# Patient Record
Sex: FEMALE | Race: WHITE | NOT HISPANIC OR LATINO | Employment: OTHER | URBAN - METROPOLITAN AREA
[De-identification: names, ages, dates, MRNs, and addresses within clinical notes are randomized per-mention and may not be internally consistent; named-entity substitution may affect disease eponyms.]

---

## 2017-02-06 DIAGNOSIS — I25.10 CAD (CORONARY ARTERY DISEASE): Primary | ICD-10-CM

## 2017-02-07 RX ORDER — METOPROLOL SUCCINATE 25 MG/1
25 TABLET, EXTENDED RELEASE ORAL DAILY
Qty: 90 TABLET | Refills: 4 | Status: SHIPPED | OUTPATIENT
Start: 2017-02-07 | End: 2018-04-23

## 2017-02-07 RX ORDER — ISOSORBIDE MONONITRATE 60 MG/1
60 TABLET, EXTENDED RELEASE ORAL DAILY
Qty: 91 TABLET | Refills: 3 | Status: SHIPPED | OUTPATIENT
Start: 2017-02-07 | End: 2018-01-24

## 2017-02-15 ENCOUNTER — APPOINTMENT (OUTPATIENT)
Dept: LAB | Facility: CLINIC | Age: 77
End: 2017-02-15
Attending: INTERNAL MEDICINE
Payer: MEDICARE

## 2017-02-15 ENCOUNTER — ONCOLOGY VISIT (OUTPATIENT)
Dept: ONCOLOGY | Facility: CLINIC | Age: 77
End: 2017-02-15
Attending: PHYSICIAN ASSISTANT
Payer: MEDICARE

## 2017-02-15 VITALS
TEMPERATURE: 97.1 F | OXYGEN SATURATION: 98 % | RESPIRATION RATE: 14 BRPM | HEART RATE: 77 BPM | SYSTOLIC BLOOD PRESSURE: 103 MMHG | DIASTOLIC BLOOD PRESSURE: 62 MMHG

## 2017-02-15 DIAGNOSIS — M81.0 OSTEOPOROSIS: ICD-10-CM

## 2017-02-15 DIAGNOSIS — Z92.29 PERSONAL HISTORY OF OTHER DRUG THERAPY: ICD-10-CM

## 2017-02-15 DIAGNOSIS — C50.411 BREAST CANCER OF UPPER-OUTER QUADRANT OF RIGHT FEMALE BREAST (H): Primary | ICD-10-CM

## 2017-02-15 DIAGNOSIS — C50.411 BREAST CANCER OF UPPER-OUTER QUADRANT OF RIGHT FEMALE BREAST (H): ICD-10-CM

## 2017-02-15 LAB
ALBUMIN SERPL-MCNC: 3.3 G/DL (ref 3.4–5)
ALP SERPL-CCNC: 54 U/L (ref 40–150)
ALT SERPL W P-5'-P-CCNC: 23 U/L (ref 0–50)
ANION GAP SERPL CALCULATED.3IONS-SCNC: 9 MMOL/L (ref 3–14)
AST SERPL W P-5'-P-CCNC: 16 U/L (ref 0–45)
BASOPHILS # BLD AUTO: 0.1 10E9/L (ref 0–0.2)
BASOPHILS NFR BLD AUTO: 0.8 %
BILIRUB SERPL-MCNC: 0.3 MG/DL (ref 0.2–1.3)
BUN SERPL-MCNC: 13 MG/DL (ref 7–30)
CALCIUM SERPL-MCNC: 8.4 MG/DL (ref 8.5–10.1)
CHLORIDE SERPL-SCNC: 104 MMOL/L (ref 94–109)
CO2 SERPL-SCNC: 23 MMOL/L (ref 20–32)
CREAT SERPL-MCNC: 0.72 MG/DL (ref 0.52–1.04)
DIFFERENTIAL METHOD BLD: NORMAL
EOSINOPHIL # BLD AUTO: 0.1 10E9/L (ref 0–0.7)
EOSINOPHIL NFR BLD AUTO: 2 %
ERYTHROCYTE [DISTWIDTH] IN BLOOD BY AUTOMATED COUNT: 13.9 % (ref 10–15)
GFR SERPL CREATININE-BSD FRML MDRD: 79 ML/MIN/1.7M2
GLUCOSE SERPL-MCNC: 98 MG/DL (ref 70–99)
HCT VFR BLD AUTO: 37.3 % (ref 35–47)
HGB BLD-MCNC: 12.3 G/DL (ref 11.7–15.7)
IMM GRANULOCYTES # BLD: 0 10E9/L (ref 0–0.4)
IMM GRANULOCYTES NFR BLD: 0.3 %
LYMPHOCYTES # BLD AUTO: 1.6 10E9/L (ref 0.8–5.3)
LYMPHOCYTES NFR BLD AUTO: 26.1 %
MCH RBC QN AUTO: 28.8 PG (ref 26.5–33)
MCHC RBC AUTO-ENTMCNC: 33 G/DL (ref 31.5–36.5)
MCV RBC AUTO: 87 FL (ref 78–100)
MONOCYTES # BLD AUTO: 0.5 10E9/L (ref 0–1.3)
MONOCYTES NFR BLD AUTO: 8.7 %
NEUTROPHILS # BLD AUTO: 3.8 10E9/L (ref 1.6–8.3)
NEUTROPHILS NFR BLD AUTO: 62.1 %
NRBC # BLD AUTO: 0 10*3/UL
NRBC BLD AUTO-RTO: 0 /100
PLATELET # BLD AUTO: 216 10E9/L (ref 150–450)
POTASSIUM SERPL-SCNC: 4.2 MMOL/L (ref 3.4–5.3)
PROT SERPL-MCNC: 6.5 G/DL (ref 6.8–8.8)
RBC # BLD AUTO: 4.27 10E12/L (ref 3.8–5.2)
SODIUM SERPL-SCNC: 136 MMOL/L (ref 133–144)
WBC # BLD AUTO: 6.1 10E9/L (ref 4–11)

## 2017-02-15 PROCEDURE — 96401 CHEMO ANTI-NEOPL SQ/IM: CPT

## 2017-02-15 PROCEDURE — 25000128 H RX IP 250 OP 636: Mod: ZF | Performed by: PHYSICIAN ASSISTANT

## 2017-02-15 PROCEDURE — 85025 COMPLETE CBC W/AUTO DIFF WBC: CPT | Performed by: INTERNAL MEDICINE

## 2017-02-15 PROCEDURE — 99212 OFFICE O/P EST SF 10 MIN: CPT | Mod: ZF

## 2017-02-15 PROCEDURE — 99214 OFFICE O/P EST MOD 30 MIN: CPT | Mod: ZP | Performed by: PHYSICIAN ASSISTANT

## 2017-02-15 PROCEDURE — 96372 THER/PROPH/DIAG INJ SC/IM: CPT

## 2017-02-15 PROCEDURE — 80053 COMPREHEN METABOLIC PANEL: CPT | Performed by: INTERNAL MEDICINE

## 2017-02-15 RX ORDER — METHYLPREDNISOLONE SODIUM SUCCINATE 125 MG/2ML
125 INJECTION, POWDER, LYOPHILIZED, FOR SOLUTION INTRAMUSCULAR; INTRAVENOUS
Status: CANCELLED
Start: 2017-02-15

## 2017-02-15 RX ORDER — SODIUM CHLORIDE 9 MG/ML
1000 INJECTION, SOLUTION INTRAVENOUS CONTINUOUS PRN
Status: CANCELLED
Start: 2017-02-15

## 2017-02-15 RX ORDER — ALBUTEROL SULFATE 90 UG/1
1-2 AEROSOL, METERED RESPIRATORY (INHALATION)
Status: CANCELLED
Start: 2017-02-15

## 2017-02-15 RX ORDER — EPINEPHRINE 0.3 MG/.3ML
0.3 INJECTION SUBCUTANEOUS EVERY 5 MIN PRN
Status: CANCELLED | OUTPATIENT
Start: 2017-02-15

## 2017-02-15 RX ORDER — ALBUTEROL SULFATE 0.83 MG/ML
2.5 SOLUTION RESPIRATORY (INHALATION)
Status: CANCELLED | OUTPATIENT
Start: 2017-02-15

## 2017-02-15 RX ORDER — DIPHENHYDRAMINE HYDROCHLORIDE 50 MG/ML
50 INJECTION INTRAMUSCULAR; INTRAVENOUS
Status: CANCELLED
Start: 2017-02-15

## 2017-02-15 RX ORDER — MEPERIDINE HYDROCHLORIDE 25 MG/ML
25 INJECTION INTRAMUSCULAR; INTRAVENOUS; SUBCUTANEOUS EVERY 30 MIN PRN
Status: CANCELLED | OUTPATIENT
Start: 2017-02-15

## 2017-02-15 RX ADMIN — DENOSUMAB 60 MG: 60 INJECTION SUBCUTANEOUS at 15:00

## 2017-02-15 NOTE — NURSING NOTE
Chief Complaint   Patient presents with     Blood Draw     Labs Drawn      Labs drawn peripherally.     Lauren Schoen, RN

## 2017-02-15 NOTE — MR AVS SNAPSHOT
After Visit Summary   2/15/2017    Shannon Krishna    MRN: [id number]           Patient Information     Date Of Birth          1940        Visit Information        Provider Department      2/15/2017 2:30 PM Iraida Carrillo PA-C [facility]         Follow-ups after your visit        Your next 10 appointments already scheduled     Feb 15, 2017  1:30 PM   [facility] Draw with  [facility] DRAW   [facility] ([facility])    [address] [place] [id number] [phone]            Feb 15, 2017  2:00 PM   (Arrive by 1:45 PM)   MA DIAGNOSTIC BILATERAL W/ VIELKA with [id number]   [facility] ([facility])    [address] [place] [id number] [phone]           Do not use any powder, lotion or deodorant under your arms or on your breast. If you do, we will ask you to remove it before your exam.  Wear comfortable, two-piece clothing.  If you have any allergies, tell your care team.  Bring any previous mammograms from other facilities or have them mailed to the breast center.            Feb 15, 2017  2:30 PM   (Arrive by 2:15 PM)   Return Visit with Iraida Carrillo PA-C   [facility] ([facility])    [address] [place] [id number] [phone]            May 24, 2017 11:30 AM   (Arrive by 11:15 AM)   Return Visit with Davon Lara MD   [facility] Nose [facility] Throat ([facility])    [address] [place] [id number] [phone]              Who to contact     If you have questions or need follow up information about today's clinic visit or your schedule please contact [facility] directly at [phone].  Normal or non-critical lab and imaging results will be communicated to you by MyChart, letter or  phone within 4 business days after the clinic has received the results. If you do not hear from us within 7 days, please contact the clinic through [employer] or phone. If you have a critical or abnormal lab result, we will notify you by phone as soon as possible.  Submit refill requests through [employer] or call your pharmacy and they will forward the refill request to us. Please allow 3 business days for your refill to be completed.          Additional Information About Your Visit        [email or web address] Information     [employer] gives you secure access to your electronic health record. If you see a primary care provider, you can also send messages to your care team and make appointments. If you have questions, please call your primary care clinic.  If you do not have a primary care provider, please call [phone] and they will assist you.         Blood Pressure from Last 3 Encounters:   08/16/16 107/61   05/18/16 94/60   02/11/16 116/69    Weight from Last 3 Encounters:   11/23/16 68.584 kg (151 lb 3.2 oz)   08/16/16 65.908 kg (145 lb 4.8 oz)   08/10/16 64.91 kg (143 lb 1.6 oz)              Today, you had the following     No orders found for display       Primary Care Provider Office Phone # Fax #    Valeria Valdivia [place] [phone]       [address] [address]        Thank you!     Thank you for choosing [facility]  for your care. Our goal is always to provide you with excellent care. Hearing back from our patients is one way we can continue to improve our services. Please take a few minutes to complete the written survey that you may receive in the mail after your visit with us. Thank you!             Your Updated Medication List - Protect others around you: Learn how to safely use, store and throw away your medicines at www.disposemymeds.org.          This list is accurate as of: 12/12/16 11:09 AM.  Always use your most recent med  list.                   Brand Name Dispense Instructions for use    amitriptyline 25 MG tablet    ELAVIL     Take 1-2 tablets by mouth At Bedtime.       anastrozole 1 MG tablet    ARIMIDEX    90 tablet    Take 1 tablet daily       aspirin 81 MG EC tablet      Take 1 tablet by mouth daily.       isosorbide mononitrate 60 MG 24 hr tablet    IMDUR    91 tablet    Take 1 tablet (60 mg) by mouth daily       LIPITOR 40 MG tablet   Generic drug:  atorvastatin      Take 40 mg by mouth daily.       lisinopril 10 MG tablet    PRINIVIL/ZESTRIL     Take 1 tablet by mouth daily 5 mg total       metoprolol 25 MG 24 hr tablet    TOPROL-XL    90 tablet    Take 1 tablet (25 mg) by mouth daily       MULTIVITAMINS PO      Take 1 tablet by mouth daily.       nitroglycerin 0.4 MG sublingual tablet    NITROSTAT    25 tablet    Place 1 tablet (0.4 mg) under the tongue every 5 minutes as needed for chest pain       OMEGA 3 PO      Take  by mouth. 1 every two days       oxazepam 10 MG capsule    SERAX     Take 1 capsule by mouth daily.       ranitidine 150 MG tablet    ZANTAC     Take 1 tablet by mouth 2 times daily. In the morning and in the evening.

## 2017-02-15 NOTE — LETTER
2/15/2017       RE: Shannon Krishna  [address]   [facility] [id number]     Dear Colleague,    Thank you for referring your patient, Shannon Krishna, to the [facility]. Please see a copy of my visit note below.    DIAGNOSIS:  Stage I (T1 N0 M0) infiltrating ductal carcinoma of the right breast. She was originally diagnosed with a right ultrasound-guided biopsy in June of 2008. This revealed an infiltrating ductal carcinoma, [place] grade 2 of 3, ER-positive, [place]-positive, HER2/alfredito 3+. She went on to receive a right lumpectomy with a sentinel lymph node dissection in July of 2008. [place] lymph nodes were negative for malignancy. She began adjuvant TCH (Taxotere, carboplatin, and Herceptin) and received 6 cycles of chemotherapy, completed in December of 2008. She then went on to complete a whole full year or Herceptin. She is status post breast radiation completed in February of 2009. She has been on Arimidex since March of 2009.     INTERVAL HISTORY:  I am seeing Ms. Krishna for followup of her breast carcinoma.  She continues on the Arimidex and is tolerating the medication well.  She does continue to have bilateral knee pain, which is unchanged.  The rest of the review of systems is negative (please see below).     ROS:  Answers for HPI/ROS submitted by the patient on 2/12/2017   General Symptoms: No  Skin Symptoms: No  HENT Symptoms: No  EYE SYMPTOMS: No  HEART SYMPTOMS: No  LUNG SYMPTOMS: No  INTESTINAL SYMPTOMS: No  URINARY SYMPTOMS: No  GYNECOLOGIC SYMPTOMS: No  BREAST SYMPTOMS: No  SKELETAL SYMPTOMS: No  BLOOD SYMPTOMS: No  NERVOUS SYSTEM SYMPTOMS: No  MENTAL HEALTH SYMPTOMS: No    MEDICATIONS:   Current Outpatient Prescriptions   Medication Sig Dispense Refill     metoprolol (TOPROL-XL) 25 MG 24 hr tablet Take 1 tablet (25 mg) by mouth daily 90 tablet 4     isosorbide mononitrate (IMDUR) 60 MG 24 hr tablet Take 1 tablet (60 mg) by mouth daily 91 tablet 3      anastrozole (ARIMIDEX) 1 MG tablet Take 1 tablet (1 mg) by mouth daily 90 tablet 3     nitroglycerin (NITROSTAT) 0.4 MG SL tablet Place 1 tablet (0.4 mg) under the tongue every 5 minutes as needed for chest pain 25 tablet 1     Omega-3 Fatty Acids (OMEGA 3 PO) Take  by mouth. 1 every two days       aspirin EC 81 MG EC tablet Take 1 tablet by mouth daily.       atorvastatin (LIPITOR) 40 MG tablet Take 40 mg by mouth daily.       amitriptyline (ELAVIL) 25 MG tablet Take 1-2 tablets by mouth At Bedtime.       lisinopril (PRINIVIL,ZESTRIL) 10 MG tablet Take 1 tablet by mouth daily 5 mg total       Multiple Vitamin (MULTIVITAMINS PO) Take 1 tablet by mouth daily.       oxazepam (SERAX) 10 MG capsule Take 1 capsule by mouth daily.       ranitidine (ZANTAC) 150 MG tablet Take 1 tablet by mouth 2 times daily. In the morning and in the evening.            ALLERGIES:    Allergies   Allergen Reactions     Nka [No Known Allergies]      Nkda [No Known Drug Allergies]        PHYSICAL EXAMINATION:  Vitals: /62  Pulse 77  Temp 97.1  F (36.2  C) (Oral)  Resp 14  SpO2 98%  GENERAL:  A pleasant person in no acute distress.   HEENT:  Sclerae are nonicteric.    NECK:  Supple.   LYMPH NODES:  No peripheral lymphadenopathy noted in the axillary, supraclavicular, or cervical regions.   LUNGS:  Clear to auscultation bilaterally.   HEART:  Regular rate and rhythm, with systolic murmur appreciated.   ABDOMEN:  Bowel sounds are active.  Soft and nontender.  No hepatosplenomegaly or other masses appreciated.  LOWER EXTREMITIES:  Without pitting edema to the knees bilaterally.   NEUROLOGICAL:  Alert/orientated/able to answer all questions.  CN grossly intact.  BREAST: Right breast normal to inspection.  There continues to be a mild thickening of the right breast without any dominant masses, improving. Left breast is notable for the inverted nipple which is stable. No dominant masses.      LAB:      2/15/2017 12:53   Sodium 136   Potassium  4.2   Chloride 104   Carbon Dioxide 23   Urea Nitrogen 13   Creatinine 0.72   GFR Estimate 79   GFR Estimate If Black >90...   Calcium 8.4 (L)   Anion Gap 9   Albumin 3.3 (L)   Protein Total 6.5 (L)   Bilirubin Total 0.3   Alkaline Phosphatase 54   ALT 23   AST 16   Glucose 98   WBC 6.1   Hemoglobin 12.3   Hematocrit 37.3   Platelet Count 216   RBC Count 4.27   MCV 87   MCH 28.8   MCHC 33.0   RDW 13.9   Diff Method Automated Method   % Neutrophils 62.1   % Lymphocytes 26.1   % Monocytes 8.7   % Eosinophils 2.0   % Basophils 0.8   % Immature Granulocytes 0.3   Nucleated RBCs 0   Absolute Neutrophil 3.8   Absolute Lymphocytes 1.6   Absolute Monocytes 0.5   Absolute Eosinophils 0.1   Absolute Basophils 0.1   Abs Immature Granulocytes 0.0   Absolute Nucleated RBC 0.0       RADIOLOGY:  Right breast digital diagnostic mammogram with computer aided detection and tomosynthesis 02/15/2017      IMPRESSION: BI-RADS CATEGORY: 2 - Benign Finding(s)     RECOMMENDED FOLLOW-UP: Annual Mammography.    IMPRESSION/PLAN:   1.  Stage I (T1 N0 M0) infiltrating ductal carcinoma of the right breast, ER positive, [place] positive, HER-2 positive.  Ms. Krishna continues to do well at this time.  She is not having any signs or symptoms that would suggest recurrence of her breast carcinoma.  Plan to continue the Arimidex for a 10-year course, which will be completed in 03/2019.  Mammogram from today shows no concerns.  I will have her follow up with Dr. Caal in 6 months.   2.  Bone health.  DEXA scan in 08/2016 was consistent with low bone density, with prior reports stating she had osteoporosis.  She was on Zometa, which has recently been changed over to Prolia.  We will plan to continue this on an every 6-month basis.  She is due for the injection today.  Laboratory work is adequate, and she received the injection in clinic.      If there are no interval concerns, the patient will follow up with Dr. Caal in 6 months with Prolia.     Iraida ARCE  Gerardo CARLOS

## 2017-02-15 NOTE — PROGRESS NOTES
DIAGNOSIS:  Stage I (T1 N0 M0) infiltrating ductal carcinoma of the right breast. She was originally diagnosed with a right ultrasound-guided biopsy in June of 2008. This revealed an infiltrating ductal carcinoma, [place] grade 2 of 3, ER-positive, [place]-positive, HER2/alfredito 3+. She went on to receive a right lumpectomy with a sentinel lymph node dissection in July of 2008. [place] lymph nodes were negative for malignancy. She began adjuvant TCH (Taxotere, carboplatin, and Herceptin) and received 6 cycles of chemotherapy, completed in December of 2008. She then went on to complete a whole full year or Herceptin. She is status post breast radiation completed in February of 2009. She has been on Arimidex since March of 2009.     INTERVAL HISTORY:  I am seeing Ms. Krishna for followup of her breast carcinoma.  She continues on the Arimidex and is tolerating the medication well.  She does continue to have bilateral knee pain, which is unchanged.  The rest of the review of systems is negative (please see below).     ROS:  Answers for HPI/ROS submitted by the patient on 2/12/2017   General Symptoms: No  Skin Symptoms: No  HENT Symptoms: No  EYE SYMPTOMS: No  HEART SYMPTOMS: No  LUNG SYMPTOMS: No  INTESTINAL SYMPTOMS: No  URINARY SYMPTOMS: No  GYNECOLOGIC SYMPTOMS: No  BREAST SYMPTOMS: No  SKELETAL SYMPTOMS: No  BLOOD SYMPTOMS: No  NERVOUS SYSTEM SYMPTOMS: No  MENTAL HEALTH SYMPTOMS: No    MEDICATIONS:   Current Outpatient Prescriptions   Medication Sig Dispense Refill     metoprolol (TOPROL-XL) 25 MG 24 hr tablet Take 1 tablet (25 mg) by mouth daily 90 tablet 4     isosorbide mononitrate (IMDUR) 60 MG 24 hr tablet Take 1 tablet (60 mg) by mouth daily 91 tablet 3     anastrozole (ARIMIDEX) 1 MG tablet Take 1 tablet (1 mg) by mouth daily 90 tablet 3     nitroglycerin (NITROSTAT) 0.4 MG SL tablet Place 1 tablet (0.4 mg) under the tongue every 5 minutes as needed for chest pain 25 tablet 1     Omega-3 Fatty Acids (OMEGA 3 PO)  Take  by mouth. 1 every two days       aspirin EC 81 MG EC tablet Take 1 tablet by mouth daily.       atorvastatin (LIPITOR) 40 MG tablet Take 40 mg by mouth daily.       amitriptyline (ELAVIL) 25 MG tablet Take 1-2 tablets by mouth At Bedtime.       lisinopril (PRINIVIL,ZESTRIL) 10 MG tablet Take 1 tablet by mouth daily 5 mg total       Multiple Vitamin (MULTIVITAMINS PO) Take 1 tablet by mouth daily.       oxazepam (SERAX) 10 MG capsule Take 1 capsule by mouth daily.       ranitidine (ZANTAC) 150 MG tablet Take 1 tablet by mouth 2 times daily. In the morning and in the evening.            ALLERGIES:    Allergies   Allergen Reactions     Nka [No Known Allergies]      Nkda [No Known Drug Allergies]        PHYSICAL EXAMINATION:  Vitals: /62  Pulse 77  Temp 97.1  F (36.2  C) (Oral)  Resp 14  SpO2 98%  GENERAL:  A pleasant person in no acute distress.   HEENT:  Sclerae are nonicteric.    NECK:  Supple.   LYMPH NODES:  No peripheral lymphadenopathy noted in the axillary, supraclavicular, or cervical regions.   LUNGS:  Clear to auscultation bilaterally.   HEART:  Regular rate and rhythm, with systolic murmur appreciated.   ABDOMEN:  Bowel sounds are active.  Soft and nontender.  No hepatosplenomegaly or other masses appreciated.  LOWER EXTREMITIES:  Without pitting edema to the knees bilaterally.   NEUROLOGICAL:  Alert/orientated/able to answer all questions.  CN grossly intact.  BREAST: Right breast normal to inspection.  There continues to be a mild thickening of the right breast without any dominant masses, improving. Left breast is notable for the inverted nipple which is stable. No dominant masses.      LAB:      2/15/2017 12:53   Sodium 136   Potassium 4.2   Chloride 104   Carbon Dioxide 23   Urea Nitrogen 13   Creatinine 0.72   GFR Estimate 79   GFR Estimate If Black >90...   Calcium 8.4 (L)   Anion Gap 9   Albumin 3.3 (L)   Protein Total 6.5 (L)   Bilirubin Total 0.3   Alkaline Phosphatase 54   ALT 23   AST  16   Glucose 98   WBC 6.1   Hemoglobin 12.3   Hematocrit 37.3   Platelet Count 216   RBC Count 4.27   MCV 87   MCH 28.8   MCHC 33.0   RDW 13.9   Diff Method Automated Method   % Neutrophils 62.1   % Lymphocytes 26.1   % Monocytes 8.7   % Eosinophils 2.0   % Basophils 0.8   % Immature Granulocytes 0.3   Nucleated RBCs 0   Absolute Neutrophil 3.8   Absolute Lymphocytes 1.6   Absolute Monocytes 0.5   Absolute Eosinophils 0.1   Absolute Basophils 0.1   Abs Immature Granulocytes 0.0   Absolute Nucleated RBC 0.0       RADIOLOGY:  Right breast digital diagnostic mammogram with computer aided detection and tomosynthesis 02/15/2017      IMPRESSION: BI-RADS CATEGORY: 2 - Benign Finding(s)     RECOMMENDED FOLLOW-UP: Annual Mammography.    IMPRESSION/PLAN:   1.  Stage I (T1 N0 M0) infiltrating ductal carcinoma of the right breast, ER positive, [place] positive, HER-2 positive.  Ms. Krishna continues to do well at this time.  She is not having any signs or symptoms that would suggest recurrence of her breast carcinoma.  Plan to continue the Arimidex for a 10-year course, which will be completed in 03/2019.  Mammogram from today shows no concerns.  I will have her follow up with Dr. Caal in 6 months.   2.  Bone health.  DEXA scan in 08/2016 was consistent with low bone density, with prior reports stating she had osteoporosis.  She was on Zometa, which has recently been changed over to Prolia.  We will plan to continue this on an every 6-month basis.  She is due for the injection today.  Laboratory work is adequate, and she received the injection in clinic.      If there are no interval concerns, the patient will follow up with Dr. Caal in 6 months with Prolia.     Iraida Carrillo PA-C

## 2017-02-15 NOTE — NURSING NOTE
"Shannon Krishna is a 76 year old female who presents for:  Chief Complaint   Patient presents with     Blood Draw     Labs Drawn      Oncology Clinic Visit     pt is here for annual FU        Initial Vitals:  /62  Pulse 77  Temp 97.1  F (36.2  C) (Oral)  Resp 14  SpO2 98% Estimated body mass index is 25.19 kg/(m^2) as calculated from the following:    Height as of 11/23/16: 1.65 m (5' 4.96\").    Weight as of 11/23/16: 68.6 kg (151 lb 3.2 oz).. There is no height or weight on file to calculate BSA. BP completed using cuff size: NA (Not Taken)  Data Unavailable No LMP recorded. Patient is postmenopausal. Allergies and medications reviewed.     Medications: Medication refills not needed today.  Pharmacy name entered into [facility]:    [facility] - [place], [place] - [address]  WRITTEN PRESCRIPTION REQUESTED  [facility] - [place], [place] - [phone]    Comments: Vitals taken in lab.    6 minutes for nursing intake (face to face time)   Millie Sanches CMA        "

## 2017-03-15 ENCOUNTER — TELEPHONE (OUTPATIENT)
Dept: ONCOLOGY | Facility: CLINIC | Age: 77
End: 2017-03-15

## 2017-03-15 NOTE — TELEPHONE ENCOUNTER
Shannon called in wanting to speak to Iraidahumberto Carrillo who she last saw in February 2017 for her long term follow up. She has a lump, hard, tender to touch, little red, no warmth to inner aspect of wrist below thumb. She describes it as smaller than a marcello and larger than a pea. The lump is fixed and appeared about 1 week ago. She is concerned it may be a blood clot from getting labs drawn in February. I let her know usually labs are not drawn in that location and it would have occurred immediately following a venipuncture. I advised she contact her PCP and see about getting a PCP set up locally as her current PCP is located in [facility]. She agreed to call PCP and try to see someone locally in [place] so her general health concerns can be more easily addressed.

## 2017-04-15 ASSESSMENT — ENCOUNTER SYMPTOMS
ARTHRALGIAS: 1
MUSCLE WEAKNESS: 0
BACK PAIN: 0
MYALGIAS: 0
JOINT SWELLING: 1
NECK PAIN: 0
STIFFNESS: 0
MUSCLE CRAMPS: 0

## 2017-04-18 ENCOUNTER — OFFICE VISIT (OUTPATIENT)
Dept: ORTHOPEDICS | Facility: CLINIC | Age: 77
End: 2017-04-18

## 2017-04-18 VITALS — SYSTOLIC BLOOD PRESSURE: 127 MMHG | OXYGEN SATURATION: 96 % | HEART RATE: 85 BPM | DIASTOLIC BLOOD PRESSURE: 64 MMHG

## 2017-04-18 DIAGNOSIS — M17.11 PRIMARY OSTEOARTHRITIS OF RIGHT KNEE: ICD-10-CM

## 2017-04-18 DIAGNOSIS — M67.40 GANGLION CYST: ICD-10-CM

## 2017-04-18 DIAGNOSIS — M25.561 CHRONIC PAIN OF RIGHT KNEE: Primary | ICD-10-CM

## 2017-04-18 DIAGNOSIS — G89.29 CHRONIC PAIN OF RIGHT KNEE: Primary | ICD-10-CM

## 2017-04-18 NOTE — MR AVS SNAPSHOT
After Visit Summary   4/18/2017    Shannon Krishna    MRN: [id number]           Patient Information     Date Of Birth          1940        Visit Information        Provider Department      4/18/2017 2:30 PM Elias Baker MD [facility]         Follow-ups after your visit        Your next 10 appointments already scheduled     Apr 28, 2017  3:00 PM CDT   (Arrive by 2:45 PM)   Return Visit with Elias Baker MD   [facility] ([facility])    [address] [place] [id number] [phone]            May 02, 2017 11:00 AM CDT   Ech Complete with [id number]   [facility] ([facility])    [address] [place] [id number] [phone]           1.  Please bring or wear a comfortable two-piece outfit. 2.  You may eat, drink and take your normal medicines. 3.  For any questions that cannot be answered, please contact the ordering physician            May 02, 2017  3:00 PM CDT   (Arrive by 2:45 PM)   Return Visit with NURYS Shin MD   [facility] ([facility])    [address] [place] [id number] [phone]            May 24, 2017 11:30 AM CDT   (Arrive by 11:15 AM)   Return Visit with Davon Lara MD   [facility] ([facility])    [address] [place] [address] [phone]            Aug 29, 2017 12:00 PM CDT   [facility] with  [facility]   [facility] ([facility])    [address] [place] [id number]   [phone]            Aug 29, 2017 12:30 PM CDT   (Arrive by 12:15 PM)   Return Visit with Elias Caal MD   [facility] ([facility])    [address] [place] [address]  [place] [id number] [phone]              Who to contact     Please call your clinic at [phone] to:    Ask questions about your health    Make or cancel appointments    Discuss your medicines    Learn about your test results    Speak to your doctor   If you have compliments or concerns about an experience at your clinic, or if you wish to file a complaint, please contact [facility] Patient Relations at [phone] or email us at [email or web address]         Additional Information About Your Visit        [employer] Information     [employer] gives you secure access to your electronic health record. If you see a primary care provider, you can also send messages to your care team and make appointments. If you have questions, please call your primary care clinic.  If you do not have a primary care provider, please call [phone] and they will assist you.      [employer] is an electronic gateway that provides easy, online access to your medical records. With [employer], you can request a clinic appointment, read your test results, renew a prescription or communicate with your care team.     To access your existing account, please contact your [facility] or call [phone] for assistance.        Care EveryWhere ID     This is your Care EveryWhere ID. This could be used by other organizations to access your [facility] medical records  [id number]        Your Vitals Were     Pulse Pulse Oximetry                85 96%           Blood Pressure from Last 3 Encounters:   04/18/17 127/64   02/15/17 103/62   08/16/16 107/61    Weight from Last 3 Encounters:   11/23/16 68.6 kg (151 lb 3.2 oz)   08/16/16 65.9 kg (145 lb 4.8 oz)   08/10/16 64.9 kg (143 lb 1.6 oz)              Today, you had the following     No orders found for display       Primary Care Provider Office Phone # Fax #    Valeria Reynlods [phone]       [facility] [place]  [place] [address] [facility] [address]        Thank you!     Thank you for choosing [facility]  for your care. Our goal is always to provide you with excellent care. Hearing back from our patients is one way we can continue to improve our services. Please take a few minutes to complete the written survey that you may receive in the mail after your visit with us. Thank you!             Your Updated Medication List - Protect others around you: Learn how to safely use, store and throw away your medicines at www.disposemymeds.org.          This list is accurate as of: 4/18/17  3:02 PM.  Always use your most recent med list.                   Brand Name Dispense Instructions for use    amitriptyline 25 MG tablet    ELAVIL     Take 1-2 tablets by mouth At Bedtime.       anastrozole 1 MG tablet    ARIMIDEX    90 tablet    Take 1 tablet (1 mg) by mouth daily       aspirin 81 MG EC tablet      Take 1 tablet by mouth daily.       isosorbide mononitrate 60 MG 24 hr tablet    IMDUR    91 tablet    Take 1 tablet (60 mg) by mouth daily       LIPITOR 40 MG tablet   Generic drug:  atorvastatin      Take 40 mg by mouth daily.       lisinopril 10 MG tablet    PRINIVIL/ZESTRIL     Take 1 tablet by mouth daily 5 mg total       metoprolol 25 MG 24 hr tablet    TOPROL-XL    90 tablet    Take 1 tablet (25 mg) by mouth daily       MULTIVITAMINS PO      Take 1 tablet by mouth daily.       nitroglycerin 0.4 MG sublingual tablet    NITROSTAT    25 tablet    Place 1 tablet (0.4 mg) under the tongue every 5 minutes as needed for chest pain       OMEGA 3 PO      Take  by mouth. 1 every two days       oxazepam 10 MG capsule    SERAX     Take 1 capsule by mouth daily.       ranitidine 150 MG tablet    ZANTAC     Take 1 tablet by mouth 2 times daily. In the morning and in the evening.

## 2017-04-18 NOTE — NURSING NOTE
[facility]  [address] [place] [id number]  Dept: [phone]  ______________________________________________________________________________    Patient: Shannon Krishna   : 1940   MRN: [id number]   2017    INVASIVE PROCEDURE SAFETY CHECKLIST    Date: 2017   Procedure:Right wrist, right knee cortisone injection  Patient Name: Shannon Krishna  MRN: [id number]  YOB: 1940    Action: Complete sections as appropriate. Any discrepancy results in a HARD COPY until resolved.     PRE PROCEDURE:  Patient ID verified with 2 identifiers (name and  or MRN): Yes  Procedure and site verified with patient/designee (when able): Yes  Accurate consent documentation in medical record: Yes  H&P (or appropriate assessment) documented in medical record: Yes  H&P must be up to 20 days prior to procedure and updates within 24 hours of procedure as applicable: Yes  Relevant diagnostic and radiology test results appropriately labeled and displayed as applicable: Yes  Procedure site(s) marked with provider initials: NA    TIMEOUT:  Time-Out performed immediately prior to starting procedure, including verbal and active participation of all team members addressing the following:Yes  * Correct patient identify  * Confirmed that the correct side and site are marked  * An accurate procedure consent form  * Agreement on the procedure to be done  * Correct patient position  * Relevant images and results are properly labeled and appropriately displayed  * The need to administer antibiotics or fluids for irrigation purposes during the procedure as applicable   * Safety precautions based on patient history or medication use    DURING PROCEDURE: Verification of correct person, site, and procedures any time the responsibility for care of the patient is transferred to another member of the care team.

## 2017-04-18 NOTE — PROGRESS NOTES
HISTORY OF PRESENT ILLNESS    Ms. Krishna returns to discuss her knee pain. Now it is her right knee that bothers her and she would like an injection if possible as previously discussed. She is also interested in PT  She also has c/o right wrist swelling and cyst. She was supposed to see a hand surgeon for possible surgery, but she would like my opinion. It has been present for about a month and is slightly painful  Additional history: as documented  Problem list, Medication list, Allergies, and Medical/Social/Surgical histories reviewed in [facility] and updated as appropriate.     REVIEW OF SYSTEMS 4/18/17  Constitutional: negative for fever, chills, change in weight  Skin: negative for worrisome rashes, moles or lesions  Eyes: negative for vision changes or irritation  Neuro: negative for weakness, dizziness or paresthesias  Resp: negative for significant cough or SOB  GI: negative for nausea, abdominal pain, heartburn, or change in bowel habits   MSK: negative for other joint problems currently, refer to HPI     PHYSICAL EXAM  Vital Signs: /64  Pulse 85  SpO2 96% Patient declined being weighed. There is no height or weight on file to calculate BMI.    General  - normal appearance, in no obvious distress. Overweight  CV  - normal popliteal pulse  Pulm  - normal respiratory pattern, non-labored  Musculoskeletal - right knee  - stance: non antalgic gait, genu varum  - inspection: generalized swelling, trace effusion  - palpation: no medial joint line tenderness, patella and patellar tendon non-tender, normal popliteal pulse  - ROM: 100 degrees flexion, 0 degrees extension, nonpainful active ROM  - strength: 5/5 in flexion, 5/5 in extension  - neuro: no sensory or motor deficit  - special tests:  (-) varus at 0 and 30 degrees flexion  (-) valgus at 0 and 30 degrees flexion  (-)  s compression test  (-) patellar apprehension  Neuro  - no sensory or motor deficit, grossly normal coordination, normal muscle  tone  Skin  - no ecchymosis, erythema, warmth, or induration, no obvious rash  Psych  - interactive, appropriate, normal mood and affect  Right wrist: 1cm large and mobile and slightly tender volar sided ganglion cyst, has full ROM of right wrist         ASSESSMENT    Ms. Krishna is a 75 year old year old female who is in the office today for: right knee pain secondary to osteoarthritis and right wrist ganglion cyst      PLAN    I ordered Shannon the following medication(s) and explained how to take: Tylenol  I gave her a right knee injection today  I also aspirated and injected her right wrist volar ganglion cyst    I independently ordered and reviewed the following imaging studies with Shannon: bilateral knee x-rays showed tri-compartmental arthritis    I recommend ice x 10 min every 2 hours as needed    I gave Shannon a home exercise program     Ordered PT for her knees    Shannon had all of their questions answered and understand to refrain from activities that are not tolerable    I recommend that Shannon return to my office for a re-examination in 4 weeks   She should follow up sooner if the condition worsens or if other problems arise.  It was a pleasure taking care of Shannon.    Dr. Elias Baker    PROCEDURE:         right   Knee joint injection   NDC 6919-4079-91 kenalog     The patient was apprised of the risks and the benefits of the procedure and consented and a written consent was signed.   The patient s  KNEE was sterilely prepped with chloraprep.   40 mg of triamcinolone suspension was drawn up into a 5 mL syringe with 2 mL of 1% lidocaine  The patient was injected with a 1.5-inch 22-gauge needle at the_superiorlateral aspect of their knee joint  There were no complications. The patient tolerated the procedure well. There was minimal bleeding.   The patient was instructed to ice the knee upon leaving clinic and refrain from overuse over the next 3 days.   The patient was instructed to go to the  emergency room with any usual pain, swelling, or redness occurred in the injected area.   The patient was given a followup appointment to evaluate response to the injection to their increased range of motion and reduction of pain.  Follow up PRN      PROCEDURE:       Right wrist ganglion cyst aspiration/ injection   DEPO-MEDROL NDC# 5832-6411-24 WAS INJECTED      The patient was apprised of the risks and the benefits of the procedure and consented and a written consent was signed.   The patient s wrist was sterilely prepped with chloraprep.   40 mg of depo suspension was drawn up into a 3 mL syringe with 1 mL of 1% lidocaine  The patient was aspirated of 1cc of straw colored fluid from ganglion cyst and then injected with a 1.5-inch 18-gauge needle at the right wrist volar ganglion cyst  There were no complications. The patient tolerated the procedure well. There was minimal bleeding.   The patient was instructed to ice the wrist upon leaving clinic and refrain from overuse over the next 3 days.   The patient was instructed to go to the emergency room with any usual pain, swelling, or redness occurred in the injected area.   The patient was given a followup appointment to evaluate response to the injection to their increased range of motion and reduction of pain.  Follow up PRN  Dr Elias Baker

## 2017-04-18 NOTE — LETTER
4/18/2017       RE: Shannon Krishna  [address]   [facility] [address]     Dear Colleague,    Thank you for referring your patient, Shannon Krishna, to the [facility] at [facility]. Please see a copy of my visit note below.    HISTORY OF PRESENT ILLNESS    Ms. Krishna returns to discuss her knee pain. Now it is her right knee that bothers her and she would like an injection if possible as previously discussed. She is also interested in PT  She also has c/o right wrist swelling and cyst. She was supposed to see a hand surgeon for possible surgery, but she would like my opinion. It has been present for about a month and is slightly painful  Additional history: as documented  Problem list, Medication list, Allergies, and Medical/Social/Surgical histories reviewed in [facility] and updated as appropriate.     REVIEW OF SYSTEMS 4/18/17  Constitutional: negative for fever, chills, change in weight  Skin: negative for worrisome rashes, moles or lesions  Eyes: negative for vision changes or irritation  Neuro: negative for weakness, dizziness or paresthesias  Resp: negative for significant cough or SOB  GI: negative for nausea, abdominal pain, heartburn, or change in bowel habits   MSK: negative for other joint problems currently, refer to HPI     PHYSICAL EXAM  Vital Signs: /64  Pulse 85  SpO2 96% Patient declined being weighed. There is no height or weight on file to calculate BMI.    General  - normal appearance, in no obvious distress. Overweight  CV  - normal popliteal pulse  Pulm  - normal respiratory pattern, non-labored  Musculoskeletal - right knee  - stance: non antalgic gait, genu varum  - inspection: generalized swelling, trace effusion  - palpation: no medial joint line tenderness, patella and patellar tendon non-tender, normal popliteal pulse  - ROM: 100 degrees flexion, 0 degrees extension, nonpainful active ROM  - strength: 5/5 in  flexion, 5/5 in extension  - neuro: no sensory or motor deficit  - special tests:  (-) varus at 0 and 30 degrees flexion  (-) valgus at 0 and 30 degrees flexion  (-)  s compression test  (-) patellar apprehension  Neuro  - no sensory or motor deficit, grossly normal coordination, normal muscle tone  Skin  - no ecchymosis, erythema, warmth, or induration, no obvious rash  Psych  - interactive, appropriate, normal mood and affect  Right wrist: 1cm large and mobile and slightly tender volar sided ganglion cyst, has full ROM of right wrist         ASSESSMENT    Ms. Krishna is a 75 year old year old female who is in the office today for: right knee pain secondary to osteoarthritis and right wrist ganglion cyst      PLAN    I ordered Shannon the following medication(s) and explained how to take: Tylenol  I gave her a right knee injection today  I also aspirated and injected her right wrist volar ganglion cyst    I independently ordered and reviewed the following imaging studies with Shannon: bilateral knee x-rays showed tri-compartmental arthritis    I recommend ice x 10 min every 2 hours as needed    I gave Shannon a home exercise program     Ordered PT for her knees    Shannon had all of their questions answered and understand to refrain from activities that are not tolerable    I recommend that Shannon return to my office for a re-examination in 4 weeks   She should follow up sooner if the condition worsens or if other problems arise.  It was a pleasure taking care of Shannon.    Dr. Elias Baker    PROCEDURE:         right   Knee joint injection   NDC 6846-3756-01 kenalog     The patient was apprised of the risks and the benefits of the procedure and consented and a written consent was signed.   The patient s  KNEE was sterilely prepped with chloraprep.   40 mg of triamcinolone suspension was drawn up into a 5 mL syringe with 2 mL of 1% lidocaine  The patient was injected with a 1.5-inch 22-gauge needle at  the_superiorlateral aspect of their knee joint  There were no complications. The patient tolerated the procedure well. There was minimal bleeding.   The patient was instructed to ice the knee upon leaving clinic and refrain from overuse over the next 3 days.   The patient was instructed to go to the emergency room with any usual pain, swelling, or redness occurred in the injected area.   The patient was given a followup appointment to evaluate response to the injection to their increased range of motion and reduction of pain.  Follow up PRN      PROCEDURE:       Right wrist ganglion cyst aspiration/ injection   DEPO-MEDROL NDC# 2737-3132-45 WAS INJECTED      The patient was apprised of the risks and the benefits of the procedure and consented and a written consent was signed.   The patient s wrist was sterilely prepped with chloraprep.   40 mg of depo suspension was drawn up into a 3 mL syringe with 1 mL of 1% lidocaine  The patient was aspirated of 1cc of straw colored fluid from ganglion cyst and then injected with a 1.5-inch 18-gauge needle at the right wrist volar ganglion cyst  There were no complications. The patient tolerated the procedure well. There was minimal bleeding.   The patient was instructed to ice the wrist upon leaving clinic and refrain from overuse over the next 3 days.   The patient was instructed to go to the emergency room with any usual pain, swelling, or redness occurred in the injected area.   The patient was given a followup appointment to evaluate response to the injection to their increased range of motion and reduction of pain.  Follow up PRN  Dr Elias Baker

## 2017-04-19 ASSESSMENT — ENCOUNTER SYMPTOMS
NECK PAIN: 0
ARTHRALGIAS: 1
MUSCLE CRAMPS: 0
JOINT SWELLING: 0
STIFFNESS: 0
JOINT SWELLING: 0
MUSCLE CRAMPS: 0
BACK PAIN: 0
ARTHRALGIAS: 1
BACK PAIN: 0
MUSCLE WEAKNESS: 0
STIFFNESS: 0
NECK PAIN: 0
MYALGIAS: 0
MYALGIAS: 0
MUSCLE WEAKNESS: 0

## 2017-04-28 ENCOUNTER — OFFICE VISIT (OUTPATIENT)
Dept: ORTHOPEDICS | Facility: CLINIC | Age: 77
End: 2017-04-28

## 2017-04-28 DIAGNOSIS — M17.11 PRIMARY OSTEOARTHRITIS OF RIGHT KNEE: Primary | ICD-10-CM

## 2017-04-28 DIAGNOSIS — M67.40 GANGLION CYST: ICD-10-CM

## 2017-04-28 NOTE — LETTER
4/28/2017       RE: Shannon Krishna  [address]   [facility] [address]     Dear Colleague,    Thank you for referring your patient, Shannon Krishna, to the [facility] at [facility]. Please see a copy of my visit note below.    HISTORY OF PRESENT ILLNESS    Ms. Krishna returns for followup, no pain and no cyst in her wrist today. Had injection last visit  Knee feels much better, still small amount of pain, will start PT next week    Additional history: as documented  Problem list, Medication list, Allergies, and Medical/Social/Surgical histories reviewed in [facility] and updated as appropriate.     REVIEW OF SYSTEMS 4/28  Constitutional: negative for fever, chills, change in weight  Skin: negative for worrisome rashes, moles or lesions  Eyes: negative for vision changes or irritation  Neuro: negative for weakness, dizziness or paresthesias  Resp: negative for significant cough or SOB  GI: negative for nausea, abdominal pain, heartburn, or change in bowel habits   MSK: negative for other joint problems currently, refer to HPI     PHYSICAL EXAM  Vital Signs: VSS , reviewed  General  - normal appearance, in no obvious distress. Overweight  CV  - normal popliteal pulse  Pulm  - normal respiratory pattern, non-labored  Musculoskeletal - right knee  - stance: non antalgic gait, genu varum  - inspection: no generalized swelling, trace effusion  - palpation: no medial joint line tenderness, patella and patellar tendon non-tender, normal popliteal pulse  - ROM: 100 degrees flexion, 0 degrees extension, non painful active ROM  - strength: 5/5 in flexion, 5/5 in extension  - neuro: no sensory or motor deficit  - special tests:  (-) varus at 0 and 30 degrees flexion  (-) valgus at 0 and 30 degrees flexion  (-)  s compression test  (-) patellar apprehension  Neuro  - no sensory or motor deficit, grossly normal coordination, normal muscle tone  Skin  - no  ecchymosis, erythema, warmth, or induration, no obvious rash  Psych  - interactive, appropriate, normal mood and affect  Right wrist: non tender and reduced to minimum volar sided ganglion cyst, has full ROM of right wrist  Improved overall       ASSESSMENT    Ms. Krishna is a 75 year old year old female who is in the office today for: right knee pain secondary to osteoarthritis- improved and right wrist ganglion cyst- improved      PLAN    I ordered Shannon the following medication(s) and explained how to take: Tylenol and voltaren gel for knee  Consider repeat injections for either wrist or knee in the future if not improving    Shannon had all of their questions answered and understand to refrain from activities that are not tolerable    I recommend that Shannon return to my office for a re-examination in 4 weeks as needed after PT   She should follow up sooner if the condition worsens or if other problems arise.  It was a pleasure taking care of Shannon.    Sincerely,    Elias Baker MD

## 2017-04-28 NOTE — PROGRESS NOTES
HISTORY OF PRESENT ILLNESS    Ms. Krishna returns for followup, no pain and no cyst in her wrist today. Had injection last visit  Knee feels much better, still small amount of pain, will start PT next week    Additional history: as documented  Problem list, Medication list, Allergies, and Medical/Social/Surgical histories reviewed in [facility] and updated as appropriate.     REVIEW OF SYSTEMS 4/28  Constitutional: negative for fever, chills, change in weight  Skin: negative for worrisome rashes, moles or lesions  Eyes: negative for vision changes or irritation  Neuro: negative for weakness, dizziness or paresthesias  Resp: negative for significant cough or SOB  GI: negative for nausea, abdominal pain, heartburn, or change in bowel habits   MSK: negative for other joint problems currently, refer to [facility]     PHYSICAL EXAM  Vital Signs: VSS , reviewed  General  - normal appearance, in no obvious distress. Overweight  CV  - normal popliteal pulse  Pulm  - normal respiratory pattern, non-labored  Musculoskeletal - right knee  - stance: non antalgic gait, genu varum  - inspection: no generalized swelling, trace effusion  - palpation: no medial joint line tenderness, patella and patellar tendon non-tender, normal popliteal pulse  - ROM: 100 degrees flexion, 0 degrees extension, non painful active ROM  - strength: 5/5 in flexion, 5/5 in extension  - neuro: no sensory or motor deficit  - special tests:  (-) varus at 0 and 30 degrees flexion  (-) valgus at 0 and 30 degrees flexion  (-)  s compression test  (-) patellar apprehension  Neuro  - no sensory or motor deficit, grossly normal coordination, normal muscle tone  Skin  - no ecchymosis, erythema, warmth, or induration, no obvious rash  Psych  - interactive, appropriate, normal mood and affect  Right wrist: non tender and reduced to minimum volar sided ganglion cyst, has full ROM of right wrist  Improved overall       ASSESSMENT    Ms. Krishna is a 75 year old year old female who  is in the office today for: right knee pain secondary to osteoarthritis- improved and right wrist ganglion cyst- improved      PLAN    I ordered Shannon the following medication(s) and explained how to take: Tylenol and voltaren gel for knee  Consider repeat injections for either wrist or knee in the future if not improving    Shannon had all of their questions answered and understand to refrain from activities that are not tolerable    I recommend that Shannon return to my office for a re-examination in 4 weeks as needed after PT   She should follow up sooner if the condition worsens or if other problems arise.  It was a pleasure taking care of Shannon.    Dr. Elias Baker

## 2017-04-28 NOTE — NURSING NOTE
Reason For Visit:   Chief Complaint   Patient presents with     RECHECK     Follow up. Right wrist and right knee. Pt stated that they are better.        Pain Assessment  Patient Currently in Pain: Yes  0-10 Pain Scale: 5  Primary Pain Location: Knee  Pain Orientation: Right  Pain Descriptors:  (intermittent)  Alleviating Factors:  (ace bandage)  Aggravating Factors:  (Going down stairs. )         Current Outpatient Prescriptions   Medication Sig Dispense Refill     metoprolol (TOPROL-XL) 25 MG 24 hr tablet Take 1 tablet (25 mg) by mouth daily 90 tablet 4     isosorbide mononitrate (IMDUR) 60 MG 24 hr tablet Take 1 tablet (60 mg) by mouth daily 91 tablet 3     anastrozole (ARIMIDEX) 1 MG tablet Take 1 tablet (1 mg) by mouth daily 90 tablet 3     nitroglycerin (NITROSTAT) 0.4 MG SL tablet Place 1 tablet (0.4 mg) under the tongue every 5 minutes as needed for chest pain 25 tablet 1     Omega-3 Fatty Acids (OMEGA 3 PO) Take  by mouth. 1 every two days       aspirin EC 81 MG EC tablet Take 1 tablet by mouth daily.       atorvastatin (LIPITOR) 40 MG tablet Take 40 mg by mouth daily.       amitriptyline (ELAVIL) 25 MG tablet Take 1-2 tablets by mouth At Bedtime.       lisinopril (PRINIVIL,ZESTRIL) 10 MG tablet Take 1 tablet by mouth daily 5 mg total       Multiple Vitamin (MULTIVITAMINS PO) Take 1 tablet by mouth daily.       oxazepam (SERAX) 10 MG capsule Take 1 capsule by mouth daily.       ranitidine (ZANTAC) 150 MG tablet Take 1 tablet by mouth 2 times daily. In the morning and in the evening.             Allergies   Allergen Reactions     Nka [No Known Allergies]      Nkda [No Known Drug Allergies]

## 2017-04-28 NOTE — MR AVS SNAPSHOT
After Visit Summary   4/28/2017    Shannon Krishna    MRN: [id number]           Patient Information     Date Of Birth          1940        Visit Information        Provider Department      4/28/2017 3:00 PM Elias Baker MD [facility]        Today's Diagnoses     Primary osteoarthritis of right knee    -  1    Ganglion cyst           Follow-ups after your visit        Your next 10 appointments already scheduled     May 01, 2017 12:50 PM CDT   (Arrive by 12:35 PM)   KATHARINE Extremity with Elizabeth Aleman PT   [facility] KATHARINE ([facility])    [address] [place] [id number] [phone]            May 02, 2017 11:00 AM CDT   Ech Complete with UCECHCR2   [facility] ([facility])    [address] [place] [address] [id number] [phone]           1.  Please bring or wear a comfortable two-piece outfit. 2.  You may eat, drink and take your normal medicines. 3.  For any questions that cannot be answered, please contact the ordering physician            May 02, 2017  3:00 PM CDT   (Arrive by 2:45 PM)   Return Visit with NURYS Shin MD   [facility] ([facility])    [address] [place] [phone]            May 08, 2017 12:00 PM CDT   KTAHARINE Extremity with Diana Peralta PT   [facility] KATHARINE ([facility])    [address] [place] [address] [id number] [phone]            May 24, 2017 11:30 AM CDT   (Arrive by 11:15 AM)   Return Visit with Davon Lara MD   [facility] ([facility])    [address] [place] [phone]            Aug 29, 2017 12:00 PM CDT   [facility] Draw with  [facility] DRAW   [facility] ([facility])    [address] [phone]            Aug 29, 2017 12:30 PM CDT   (Arrive by 12:15 PM)   Return Visit with Elias Caal MD   [facility] ([facility])    [address] [phone]              Who to contact     Please call your clinic at [phone] to:    Ask questions about your health    Make or cancel appointments    Discuss your medicines    Learn about your test results    Speak to your doctor   If you have compliments or concerns about an experience at your clinic, or if you wish to file a complaint, please contact [facility] Patient Relations at [phone] or email us at [email or web address]         Additional Information About Your Visit        [employer] eShoe Information     [employer] gives you secure access to your electronic health record. If you see a primary care provider, you can also send messages to your care team and make appointments. If you have questions, please call your primary care clinic.  If you do not have a primary care provider, please call [phone] and they will assist you.      [employer] is an electronic gateway that provides easy, online access to your medical records. With [employer], you can request a clinic appointment, read your test results, renew a prescription or communicate with your care team.     To access your existing account, please contact your [facility] or call [phone] for assistance.        Care EveryWhere ID     This is your Care EveryWhere ID. This could be used by other organizations to access your [facility] medical records  [id number]         Blood Pressure from Last 3 Encounters:   04/18/17 127/64   02/15/17 103/62   08/16/16 107/61    Weight from Last 3 Encounters:   11/23/16 68.6 kg (151 lb 3.2 oz)   08/16/16 65.9 kg  (145 lb 4.8 oz)   08/10/16 64.9 kg (143 lb 1.6 oz)              Today, you had the following     No orders found for display         Today's Medication Changes          These changes are accurate as of: 4/28/17  4:01 PM.  If you have any questions, ask your nurse or doctor.               Start taking these medicines.        Dose/Directions    diclofenac 1 % Gel topical gel   Commonly known as:  VOLTAREN   Used for:  Primary osteoarthritis of right knee   Started by:  Elias Baker MD        Apply 4 grams to knees or 2 grams to hands four times daily using enclosed dosing card.   Quantity:  100 g   Refills:  1            Where to get your medicines      Call your pharmacy to confirm that your medication is ready for pickup. It may take up to 24 hours for them to receive the prescription. If the prescription is not ready within 3 business days, please contact your clinic or your provider.     We will let you know when these medications are ready. If you don't hear back within 3 business days, please contact us.     diclofenac 1 % Gel topical gel                Primary Care Provider Office Phone # Fax #    Valeria NAVARRETE [facility] [phone]       [address] [address]        Thank you!     Thank you for choosing [facility]  for your care. Our goal is always to provide you with excellent care. Hearing back from our patients is one way we can continue to improve our services. Please take a few minutes to complete the written survey that you may receive in the mail after your visit with us. Thank you!             Your Updated Medication List - Protect others around you: Learn how to safely use, store and throw away your medicines at www.disposemymeds.org.          This list is accurate as of: 4/28/17  4:01 PM.  Always use your most recent med list.                   Brand Name Dispense Instructions for use    amitriptyline 25 MG  tablet    ELAVIL     Take 1-2 tablets by mouth At Bedtime.       anastrozole 1 MG tablet    ARIMIDEX    90 tablet    Take 1 tablet (1 mg) by mouth daily       aspirin 81 MG EC tablet      Take 1 tablet by mouth daily.       diclofenac 1 % Gel topical gel    VOLTAREN    100 g    Apply 4 grams to knees or 2 grams to hands four times daily using enclosed dosing card.       isosorbide mononitrate 60 MG 24 hr tablet    IMDUR    91 tablet    Take 1 tablet (60 mg) by mouth daily       LIPITOR 40 MG tablet   Generic drug:  atorvastatin      Take 40 mg by mouth daily.       lisinopril 10 MG tablet    PRINIVIL/ZESTRIL     Take 1 tablet by mouth daily 5 mg total       metoprolol 25 MG 24 hr tablet    TOPROL-XL    90 tablet    Take 1 tablet (25 mg) by mouth daily       MULTIVITAMINS PO      Take 1 tablet by mouth daily.       nitroglycerin 0.4 MG sublingual tablet    NITROSTAT    25 tablet    Place 1 tablet (0.4 mg) under the tongue every 5 minutes as needed for chest pain       OMEGA 3 PO      Take  by mouth. 1 every two days       oxazepam 10 MG capsule    SERAX     Take 1 capsule by mouth daily.       ranitidine 150 MG tablet    ZANTAC     Take 1 tablet by mouth 2 times daily. In the morning and in the evening.

## 2017-05-01 ENCOUNTER — THERAPY VISIT (OUTPATIENT)
Dept: PHYSICAL THERAPY | Facility: CLINIC | Age: 77
End: 2017-05-01
Payer: MEDICARE

## 2017-05-01 DIAGNOSIS — M25.561 RIGHT KNEE PAIN, UNSPECIFIED CHRONICITY: Primary | ICD-10-CM

## 2017-05-01 PROCEDURE — 97162 PT EVAL MOD COMPLEX 30 MIN: CPT | Mod: GP | Performed by: PHYSICAL THERAPIST

## 2017-05-01 PROCEDURE — G8979 MOBILITY GOAL STATUS: HCPCS | Mod: GP | Performed by: PHYSICAL THERAPIST

## 2017-05-01 PROCEDURE — G8978 MOBILITY CURRENT STATUS: HCPCS | Mod: GP | Performed by: PHYSICAL THERAPIST

## 2017-05-01 PROCEDURE — 97530 THERAPEUTIC ACTIVITIES: CPT | Mod: GP | Performed by: PHYSICAL THERAPIST

## 2017-05-01 ASSESSMENT — ACTIVITIES OF DAILY LIVING (ADL)
WEAKNESS: THE SYMPTOM AFFECTS MY ACTIVITY SLIGHTLY
HOW_WOULD_YOU_RATE_THE_CURRENT_FUNCTION_OF_YOUR_KNEE_DURING_YOUR_USUAL_DAILY_ACTIVITIES_ON_A_SCALE_FROM_0_TO_100_WITH_100_BEING_YOUR_LEVEL_OF_KNEE_FUNCTION_PRIOR_TO_YOUR_INJURY_AND_0_BEING_THE_INABILITY_TO_PERFORM_ANY_OF_YOUR_USUAL_DAILY_ACTIVITIES?: 60
LIMPING: THE SYMPTOM AFFECTS MY ACTIVITY SLIGHTLY
SQUAT: ACTIVITY IS SOMEWHAT DIFFICULT
KNEEL ON THE FRONT OF YOUR KNEE: ACTIVITY IS SOMEWHAT DIFFICULT
SIT WITH YOUR KNEE BENT: ACTIVITY IS MINIMALLY DIFFICULT
KNEE_ACTIVITY_OF_DAILY_LIVING_SCORE: 67.14
GO UP STAIRS: ACTIVITY IS SOMEWHAT DIFFICULT
PAIN: THE SYMPTOM AFFECTS MY ACTIVITY SLIGHTLY
RAW_SCORE: 47
STAND: ACTIVITY IS MINIMALLY DIFFICULT
SWELLING: I DO NOT HAVE THE SYMPTOM
WALK: ACTIVITY IS SOMEWHAT DIFFICULT
AS_A_RESULT_OF_YOUR_KNEE_INJURY,_HOW_WOULD_YOU_RATE_YOUR_CURRENT_LEVEL_OF_DAILY_ACTIVITY?: NEARLY NORMAL
GIVING WAY, BUCKLING OR SHIFTING OF KNEE: I DO NOT HAVE THE SYMPTOM
GO DOWN STAIRS: ACTIVITY IS FAIRLY DIFFICULT
HOW_WOULD_YOU_RATE_THE_OVERALL_FUNCTION_OF_YOUR_KNEE_DURING_YOUR_USUAL_DAILY_ACTIVITIES?: NEARLY NORMAL
RISE FROM A CHAIR: ACTIVITY IS SOMEWHAT DIFFICULT
STIFFNESS: THE SYMPTOM AFFECTS MY ACTIVITY SLIGHTLY
KNEE_ACTIVITY_OF_DAILY_LIVING_SUM: 47

## 2017-05-01 NOTE — LETTER
DEPARTMENT OF HEALTH AND HUMAN SERVICES  CENTERS FOR MEDICARE & MEDICAID SERVICES    PLAN/UPDATED PLAN OF PROGRESS FOR OUTPATIENT REHABILITATION    PATIENTS NAME:  Shannon Krishna     : 1940    PROVIDER NUMBER:    [id number]    [facility]       [facility]OVIDER NAME: [facility] KATHARINE    MEDICAL RECORD NUMBER: [id number]     START OF CARE DATE:  SOC Date: 17   TYPE:  PT    PRIMARY/TREATMENT DIAGNOSIS: (Pertinent Medical Diagnosis)  Right knee pain, unspecified chronicity    VISITS FROM START OF CARE:  Rxs Used: 1     Physical Therapy Initial Evaluation: Subjective History     Injury/Condition Details:  Presenting Complaint Right Knee Pain & Weakness   Onset Timing/Date 2017 (MD referral date)   Mechanism 2016 had onset of pain in her knees, gradual onset without trauma. The pain increased in August when she was walking more frequently. The pain has continued since August.      Symptom Behavior Details    Primary Symptoms Constant symptoms; worsen with activity, pain (Location: medial joint line, anterior knee pain, denies radiation into the calf or into the thigh. , Quality: Aching/Throbbing), stiffness, weakness, complains of a limp   Worst Pain 6/10 (with Stairs)   Symptom Provocators Walking more than 5 blocks, descending stairs and ascending stairs   Best Pain 0/10    Symptom Relievers Knee sleeve, wrapping her knee with an ACE wrap, ice   Time of day dependent? Worse in evening after activity   Recent symptom change? symptoms worsening     Prior Testing/Intervention for current condition:  Prior Tests  x-ray   Prior Treatment Injections: Cortisone injection (very helpful), does not walk with a cane or walking stick.      Lifestyle & General Medical History:  Employment Retired, previously worked as an LPN   Usual physical activities  (within past year) Goal: walk 1 mile (last done 1 year ago), only walks for exercises   Orthopaedic history See Epic Chart   Notable medical  history See Epic Chart     Objective:  Hip Evaluation    Hip Strength:    Flexion:   Left: 4/5   Pain:  Right: 4/5   Pain:    Abduction:  Left: 3/5     Pain:Right: 3-/5    Pain:    Knee Evaluation:  ROM:    AROM    Hyperextension:  Left:  2    Right: 1  Extension:  Left: 0    Right:  0  Flexion: Left: 127    Right: 127    Pain: No pain with active ROM or OP with ROM    Strength:   Extension:  Left: 4+/5   Pain:      Right: 4/5   Pain:  Flexion:  Left: 4-/5   Pain:      Right: 4-/5   Pain:      Quad Set Right: Delayed and poor    Pain:  Ligament Testing:  Not Assessed    Special Tests: Not Assessed    Palpation:  Not Assessed    Edema:    Circumference:    10 cm Distal:  Left:  51.5  Right:  53.5  Joint Line:  Left:  Equal   Right:  Equal    Functional Testing:      Proprioception:   Stork Balance Test: Left:   Right:   % of Uninvolved:  Poor bilaterally. Had 3 episodes of instability while walking in clinic today.     Patient demonstrated extensor lag with SLR flexion of 9 degrees.  General   PATIENTS NAME:  Shannon Krishna     Assessment/Plan:      Patient is a 76 year old female with right side knee complaints.    Patient has the following significant findings with corresponding treatment plan.                Diagnosis 1:  Right Knee pain  Pain -  hot/cold therapy, manual therapy, STS, splint/taping/bracing/orthotics, self management and education  Decreased ROM/flexibility - manual therapy, therapeutic exercise and home program  Decreased strength - therapeutic exercise, therapeutic activities and home program  Impaired balance - neuro re-education, therapeutic activities and home program  Decreased proprioception - neuro re-education, therapeutic activities and home program  Impaired gait - gait training and home program  Impaired muscle performance - neuro re-education and home program  Decreased function - therapeutic activities and home program    Therapy Evaluation Codes:   1) History comprised of:   Personal  factors that impact the plan of care:      Age, Cognition, Living environment, Overall behavior pattern, Past/current experiences and Time since onset of symptoms.    Comorbidity factors that impact the plan of care are:      Cancer, Heart problems, Osteoarthritis and Osteoporosis.     Medications impacting care: Bone density, Cardiac and Muscle relaxant.  2) Examination of Body Systems comprised of:   Body structures and functions that impact the plan of care:      Knee.   Activity limitations that impact the plan of care are:      Dressing, Jumping, Lifting, Squatting/kneeling, Stairs, Standing and Walking.  3) Clinical presentation characteristics are:   Evolving/Changing.  4) Decision-Making    Moderate complexity using standardized patient assessment instrument and/or measureable assessment of functional outcome.  Cumulative Therapy Evaluation is: Moderate complexity.    Previous and current functional limitations:  (See Goal Flow Sheet for this information)    Short term and Long term goals: (See Goal Flow Sheet for this information)     Communication ability:  Patient appears to be able to clearly communicate and understand verbal and written communication and follow directions correctly.  Patient is hard of hearing and requires instructions to be stated loudly and repeatedly.  Treatment Explanation - The following has been discussed with the patient:   RX ordered/plan of care  Anticipated outcomes  Possible risks and side effects  This patient would benefit from PT intervention to resume normal activities.   Rehab potential is good.      PATIENTS NAME:  Keniadrake Shannon     Frequency:  1 X week, once daily  Duration:  for 4 weeks  Discharge Plan:  Achieve all LTG.  Independent in home treatment program.  Reach maximal therapeutic benefit.    Please refer to the daily flowsheet for treatment today, total treatment time and time spent performing 1:1 timed codes.             Caregiver Signature/Credentials  "_____________________________ Date ________       Treating Provider: Elizabeth Aleman, PT, DPT, OCS   I have reviewed and certified the need for these services and plan of treatment while under my care.        PHYSICIAN'S SIGNATURE:   _________________________________________  Date___________   Elias Cervantes [facility]    Certification period:  Beginning of Cert date period: 05/01/17 to  End of Cert period date: 07/29/17     Functional Level Progress Report: Please see attached \"Goal Flow sheet for Functional level.\"    ____X____ Continue Services or       ________ DC Services                Service dates: From  SOC Date: 05/01/17 date to present                         "

## 2017-05-01 NOTE — PROGRESS NOTES
Physical Therapy Initial Evaluation: Subjective History     Injury/Condition Details:  Presenting Complaint Right Knee Pain & Weakness   Onset Timing/Date 4/18/2017 (MD referral date)   Mechanism February 2016 had onset of pain in her knees, gradual onset without trauma. The pain increased in August when she was walking more frequently. The pain has continued since August.      Symptom Behavior Details    Primary Symptoms Constant symptoms; worsen with activity, pain (Location: medial joint line, anterior knee pain, denies radiation into the calf or into the thigh. , Quality: Aching/Throbbing), stiffness, weakness, complains of a limp   Worst Pain 6/10 (with Stairs)   Symptom Provocators Walking more than 5 blocks, descending stairs and ascending stairs   Best Pain 0/10    Symptom Relievers Knee sleeve, wrapping her knee with an ACE wrap, ice   Time of day dependent? Worse in evening after activity   Recent symptom change? symptoms worsening     Prior Testing/Intervention for current condition:  Prior Tests  x-ray   Prior Treatment Injections: Cortisone injection (very helpful), does not walk with a cane or walking stick.      Lifestyle & General Medical History:  Employment Retired, previously worked as an LPN   Usual physical activities  (within past year) Goal: walk 1 mile (last done 1 year ago), only walks for exercises   Orthopaedic history See Epic Chart   Notable medical history See Epic Chart         HPI                    Objective:    System                                           Hip Evaluation    Hip Strength:    Flexion:   Left: 4/5   Pain:  Right: 4/5   Pain:                      Abduction:  Left: 3/5     Pain:Right: 3-/5    Pain:                           Knee Evaluation:  ROM:    AROM    Hyperextension:  Left:  2    Right: 1  Extension:  Left: 0    Right:  0  Flexion: Left: 127    Right: 127    Pain: No pain with active ROM or OP with ROM    Strength:     Extension:  Left: 4+/5   Pain:      Right:  4/5   Pain:  Flexion:  Left: 4-/5   Pain:      Right: 4-/5   Pain:      Quad Set Right: Delayed and poor    Pain:  Ligament Testing:  Not Assessed                Special Tests: Not Assessed      Palpation:  Not Assessed      Edema:    Circumference:    10 cm Distal:  Left:  51.5  Right:  53.5  Joint Line:  Left:  Equal   Right:  Equal      Functional Testing:            Proprioception:   Stork Balance Test: Left:   Right:   % of Uninvolved:  Poor bilaterally. Had 3 episodes of instability while walking in clinic today.         Patient demonstrated extensor lag with SLR flexion of 9 degrees.  General     ROS    Assessment/Plan:      Patient is a 76 year old female with right side knee complaints.    Patient has the following significant findings with corresponding treatment plan.                Diagnosis 1:  Right Knee pain  Pain -  hot/cold therapy, manual therapy, STS, splint/taping/bracing/orthotics, self management and education  Decreased ROM/flexibility - manual therapy, therapeutic exercise and home program  Decreased strength - therapeutic exercise, therapeutic activities and home program  Impaired balance - neuro re-education, therapeutic activities and home program  Decreased proprioception - neuro re-education, therapeutic activities and home program  Impaired gait - gait training and home program  Impaired muscle performance - neuro re-education and home program  Decreased function - therapeutic activities and home program    Therapy Evaluation Codes:   1) History comprised of:   Personal factors that impact the plan of care:      Age, Cognition, Living environment, Overall behavior pattern, Past/current experiences and Time since onset of symptoms.    Comorbidity factors that impact the plan of care are:      Cancer, Heart problems, Osteoarthritis and Osteoporosis.     Medications impacting care: Bone density, Cardiac and Muscle relaxant.  2) Examination of Body Systems comprised of:   Body structures  and functions that impact the plan of care:      Knee.   Activity limitations that impact the plan of care are:      Dressing, Jumping, Lifting, Squatting/kneeling, Stairs, Standing and Walking.  3) Clinical presentation characteristics are:   Evolving/Changing.  4) Decision-Making    Moderate complexity using standardized patient assessment instrument and/or measureable assessment of functional outcome.  Cumulative Therapy Evaluation is: Moderate complexity.    Previous and current functional limitations:  (See Goal Flow Sheet for this information)    Short term and Long term goals: (See Goal Flow Sheet for this information)     Communication ability:  Patient appears to be able to clearly communicate and understand verbal and written communication and follow directions correctly.  Patient is hard of hearing and requires instructions to be stated loudly and repeatedly.  Treatment Explanation - The following has been discussed with the patient:   RX ordered/plan of care  Anticipated outcomes  Possible risks and side effects  This patient would benefit from PT intervention to resume normal activities.   Rehab potential is good.    Frequency:  1 X week, once daily  Duration:  for 4 weeks  Discharge Plan:  Achieve all LTG.  Independent in home treatment program.  Reach maximal therapeutic benefit.    Please refer to the daily flowsheet for treatment today, total treatment time and time spent performing 1:1 timed codes.

## 2017-05-02 ENCOUNTER — OFFICE VISIT (OUTPATIENT)
Dept: CARDIOLOGY | Facility: CLINIC | Age: 77
End: 2017-05-02
Attending: INTERNAL MEDICINE
Payer: MEDICARE

## 2017-05-02 ENCOUNTER — RADIANT APPOINTMENT (OUTPATIENT)
Dept: CARDIOLOGY | Facility: CLINIC | Age: 77
End: 2017-05-02

## 2017-05-02 VITALS
BODY MASS INDEX: 24.44 KG/M2 | HEIGHT: 66 IN | DIASTOLIC BLOOD PRESSURE: 68 MMHG | SYSTOLIC BLOOD PRESSURE: 105 MMHG | WEIGHT: 152.1 LBS | HEART RATE: 89 BPM | OXYGEN SATURATION: 96 %

## 2017-05-02 DIAGNOSIS — I35.9 AORTIC VALVE DISORDER: Primary | ICD-10-CM

## 2017-05-02 DIAGNOSIS — Z98.890 STATUS POST CORONARY ANGIOGRAM: ICD-10-CM

## 2017-05-02 PROCEDURE — 99213 OFFICE O/P EST LOW 20 MIN: CPT | Performed by: INTERNAL MEDICINE

## 2017-05-02 PROCEDURE — 99213 OFFICE O/P EST LOW 20 MIN: CPT | Mod: 25,ZF

## 2017-05-02 ASSESSMENT — PAIN SCALES - GENERAL: PAINLEVEL: NO PAIN (0)

## 2017-05-02 NOTE — PATIENT INSTRUCTIONS
Thank you for your visit today.  Please call me with any questions or concerns.   Jay Jay Martinez RN  Cardiology Care Coordinator  [phone], press option 1 then option 3

## 2017-05-02 NOTE — NURSING NOTE
Cardiac Testing: Patient given instructions regarding  echocardiogram in one year.. Discussed purpose, preparation, procedure and when to expect results reported back to the patient. Patient demonstrated understanding of this information and agreed to call with further questions or concerns.  Med Reconcile: Reviewed and verified all current medications with the patient. The updated medication list was printed and given to the patient.  Return Appointment: Follow up in one year. Patient given instructions regarding scheduling next clinic visit. Patient demonstrated understanding of this information and agreed to call with further questions or concerns.  Patient stated she understood all health information given and agreed to call with further questions or concerns.

## 2017-05-02 NOTE — LETTER
5/2/2017      RE: Shannon Krishna  [address]   [facility] [address]       Dear Colleague,    Thank you for the opportunity to participate in the care of your patient, Shannon Krishna, at the [facility] HEART [facility]. Please see a copy of my visit note below.       SUBJECTIVE:  Shannon Krishna is a 76 year old female who presents for follow up.   of RCA with good collaterals. Normal stress MPI.  Aortic stenosis.    Remain asymptomatic.    Patient Active Problem List    Diagnosis Date Noted     Right knee pain 05/01/2017     Priority: Medium     Personal history of other drug therapy 08/16/2016     Priority: Medium     Breast cancer of upper-outer quadrant of right female breast (H) 12/16/2015     Priority: Medium     Osteoporosis 08/06/2013     Priority: Medium     Imo Update utility       Status post coronary angiogram 09/27/2012     Priority: Medium     Coronary artery disease 09/27/2012     Priority: Medium     S/P angioplasty 08/30/2012     Priority: Medium     CAD (coronary artery disease) 08/30/2012     Priority: Medium     Aortic sclerosis (H)      Priority: Medium     Hypertension      Priority: Medium     Hyperlipidemia      Priority: Medium     Esophageal reflux      Priority: Medium     Adenocarcinoma of breast (H)      Priority: Medium     Breast cancer (H) 03/16/2011     Priority: Medium    .  Current Outpatient Prescriptions   Medication Sig     denosumab (PROLIA) 60 MG/ML SOLN injection Inject 60 mg Subcutaneous once     diclofenac (VOLTAREN) 1 % GEL topical gel Apply 4 grams to knees or 2 grams to hands four times daily using enclosed dosing card.     metoprolol (TOPROL-XL) 25 MG 24 hr tablet Take 1 tablet (25 mg) by mouth daily     isosorbide mononitrate (IMDUR) 60 MG 24 hr tablet Take 1 tablet (60 mg) by mouth daily     anastrozole (ARIMIDEX) 1 MG tablet Take 1 tablet (1 mg) by mouth daily     nitroglycerin (NITROSTAT) 0.4  MG SL tablet Place 1 tablet (0.4 mg) under the tongue every 5 minutes as needed for chest pain     Omega-3 Fatty Acids (OMEGA 3 PO) Take  by mouth. 1 every two days     aspirin EC 81 MG EC tablet Take 1 tablet by mouth daily.     atorvastatin (LIPITOR) 40 MG tablet Take 40 mg by mouth daily.     amitriptyline (ELAVIL) 25 MG tablet Take 1-2 tablets by mouth At Bedtime.     lisinopril (PRINIVIL,ZESTRIL) 10 MG tablet Take 1 tablet by mouth daily 5 mg total     Multiple Vitamin (MULTIVITAMINS PO) Take 1 tablet by mouth daily.     oxazepam (SERAX) 10 MG capsule Take 1 capsule by mouth daily.     ranitidine (ZANTAC) 150 MG tablet Take 1 tablet by mouth 2 times daily. In the morning and in the evening.      No current facility-administered medications for this visit.      Past Medical History:   Diagnosis Date     Adenocarcinoma of breast (H)      Aortic sclerosis (H)     Mild to moderate sclerosis without significant stenosis.     Coronary artery disease     Multivessel CAD,  of the mid RCA     Esophageal reflux      Hearing problem 2010     Hyperlipidemia      Hypertension      Osteoporosis      Past Surgical History:   Procedure Laterality Date     APPENDECTOMY      1950's     BIOPSY  2008    ultrasound guided biopsy of right breast     BREAST SURGERY       GENITOURINARY SURGERY  1970    cryosurgery on vagina     GENITOURINARY SURGERY  1975    conization     GI SURGERY  1960's    perforated ulcer     LUMPECTOMY BREAST  2008    breast cancer, done in [facility]     port-a-cath  2008    Removed in 2009     TONSILLECTOMY  1950     VASCULAR SURGERY       Allergies   Allergen Reactions     Nka [No Known Allergies]      Nkda [No Known Drug Allergies]      Social History     Social History     Marital status: Legally      Spouse name: N/A     Number of children: N/A     Years of education: N/A     Occupational History     Not on file.     Social History Main Topics     Smoking status: Former Smoker     Packs/day:  "1.00     Years: 30.00     Types: Cigarettes     Start date: 1/1/1960     Quit date: 1/1/1990     Smokeless tobacco: Former User     Quit date: 6/7/1994     Alcohol use No     Drug use: No     Sexual activity: Not Currently     Birth control/ protection: None     Other Topics Concern     Not on file     Social History Narrative     Family History   Problem Relation Age of Onset     Family History Negative       Glaucoma No family hx of      Macular Degeneration No family hx of      Amblyopia No family hx of      CANCER Mother      DIABETES Father      HEART DISEASE Father      HEART DISEASE Brother      CEREBROVASCULAR DISEASE Father           REVIEW OF SYSTEMS:  General: negative, fever, chills, night sweats  Skin: negative, acne, rash and scaling  Eyes: negative, double vision, eye pain and photophobia  Ears/Nose/Throat: negative, nasal congestion and purulent rhinorrhea  Respiratory: No dyspnea on exertion, No cough, No hemoptysis and negative  Cardiovascular: negative, palpitations, tachycardia, irregular heart beat, chest pain, exertional chest pain or pressure, paroxysmal nocturnal dyspnea, dyspnea on exertion and orthopnea         OBJECTIVE:  Blood pressure 105/68, pulse 89, height 1.664 m (5' 5.5\"), weight 69 kg (152 lb 1.6 oz), SpO2 96 %.  General Appearance: alert, active and no distress  Head: Normocephalic. No masses, lesions, tenderness or abnormalities  Eyes: conjuctiva clear, PERRL, EOM intact  Ears: External ears normal. Canals clear. TM's normal.  Nose: Nares normal  Mouth: normal  Neck: Supple, no cervical adenopathy, no thyromegaly  Lungs: clear to auscultation  Cardiac: regular rate and rhythm, normal S1 and S2, ESM.         ASSESSMENT/PLAN:  76 yr old female with  of RCA and Aortic stenosis. No symptoms.  Reviewed echo done today. Normal LV function. No RWMA. Mild AS. Mean gradient 21mmHg. Mild increase from before.Ascending aorta 3cms.  Continue current meds.  Per orders.   Return to Clinic " 1yr with echo.  Answers for HPI/ROS submitted by the patient on 4/19/2017   General Symptoms: No  Skin Symptoms: No  HENT Symptoms: No  EYE SYMPTOMS: No  HEART SYMPTOMS: No  LUNG SYMPTOMS: No  INTESTINAL SYMPTOMS: No  URINARY SYMPTOMS: No  GYNECOLOGIC SYMPTOMS: No  BREAST SYMPTOMS: No  SKELETAL SYMPTOMS: Yes  BLOOD SYMPTOMS: No  NERVOUS SYSTEM SYMPTOMS: No  MENTAL HEALTH SYMPTOMS: No  Back pain: No  Muscle aches: No  Neck pain: No  Swollen joints: No  Joint pain: Yes  Bone pain: Yes  Muscle cramps: No  Muscle weakness: No  Joint stiffness: No  Bone fracture: No    NURYS Shin MD

## 2017-05-02 NOTE — MR AVS SNAPSHOT
After Visit Summary   5/2/2017    Shannon Krishna    MRN: [id number]           Patient Information     Date Of Birth          1940        Visit Information        Provider Department      5/2/2017 3:00 PM NURYS Shin MD [facility]        Today's Diagnoses     Aortic valve disorder    -  1      Care Instructions    Thank you for your visit today.  Please call me with any questions or concerns.   Jay Jay Martinez  RN  Cardiology Care Coordinator  [phone], press option 1 then option 3        Follow-ups after your visit        Follow-up notes from your care team     Return in about 1 year (around 5/2/2018), or Aortic valve disorder, Dr. Harvey.      Your next 10 appointments already scheduled     May 02, 2017  3:00 PM CDT   (Arrive by 2:45 PM)   Return Visit with NURYS Shin MD   [facility] ([facility])    [address] [place] [address]  [address] [facility] [id number] [phone]            May 08, 2017 12:00 PM CDT   KATHARINE Extremity with Diana Peralta PT   [facility]INE ([facility])    [address] [place] [address] [address] [facility] [id number] [phone]            May 24, 2017 11:30 AM CDT   (Arrive by 11:15 AM)   Return Visit with Davon Lara MD   [facility] ([facility])    [address] [place] [address]  [address] [facility] [id number] [phone]            Aug 29, 2017 12:00 PM CDT   [facility] Draw with  [facility] D[facility]   [facility] ([facility])    [address] [place] [id number] [phone]            Aug 29, 2017 12:30 PM CDT   (Arrive by 12:15 PM)   Return Visit with Elias Caal MD   [facility] ([facility])    [address] [place] [id number] [phone]              Future  "tests that were ordered for you today     Open Future Orders        Priority Expected Expires Ordered    Echocardiogram Complete Routine  7/2/2018 5/2/2017            Who to contact     If you have questions or need follow up information about today's clinic visit or your schedule please contact [facility] directly at [phone].  Normal or non-critical lab and imaging results will be communicated to you by MyChart, letter or phone within 4 business days after the clinic has received the results. If you do not hear from us within 7 days, please contact the clinic through [employer] or phone. If you have a critical or abnormal lab result, we will notify you by phone as soon as possible.  Submit refill requests through [employer] or call your pharmacy and they will forward the refill request to us. Please allow 3 business days for your refill to be completed.          Additional Information About Your Visit        MyChart Information     [employer] gives you secure access to your electronic health record. If you see a primary care provider, you can also send messages to your care team and make appointments. If you have questions, please call your primary care clinic.  If you do not have a primary care provider, please call [phone] and they will assist you.        Care EveryWhere ID     This is your Care EveryWhere ID. This could be used by other organizations to access your [facility] medical records  [id number]        Your Vitals Were     Pulse Height Pulse Oximetry BMI (Body Mass Index)          89 1.664 m (5' 5.5\") 96% 24.93 kg/m2         Blood Pressure from Last 3 Encounters:   05/02/17 105/68   04/18/17 127/64   02/15/17 103/62    Weight from Last 3 Encounters:   05/02/17 69 kg (152 lb 1.6 oz)   11/23/16 68.6 kg (151 lb 3.2 oz)   08/16/16 65.9 kg (145 lb 4.8 oz)               Primary Care Provider Office Phone # Fax #    Valeria Reynolds [phone]       [facility] " [address] [facility] [address]        Thank you!     Thank you for choosing [facility]  for your care. Our goal is always to provide you with excellent care. Hearing back from our patients is one way we can continue to improve our services. Please take a few minutes to complete the written survey that you may receive in the mail after your visit with us. Thank you!             Your Updated Medication List - Protect others around you: Learn how to safely use, store and throw away your medicines at www.disposemymeds.org.          This list is accurate as of: 5/2/17  1:30 PM.  Always use your most recent med list.                   Brand Name Dispense Instructions for use    amitriptyline 25 MG tablet    ELAVIL     Take 1-2 tablets by mouth At Bedtime.       anastrozole 1 MG tablet    ARIMIDEX    90 tablet    Take 1 tablet (1 mg) by mouth daily       aspirin 81 MG EC tablet      Take 1 tablet by mouth daily.       diclofenac 1 % Gel topical gel    VOLTAREN    100 g    Apply 4 grams to knees or 2 grams to hands four times daily using enclosed dosing card.       isosorbide mononitrate 60 MG 24 hr tablet    IMDUR    91 tablet    Take 1 tablet (60 mg) by mouth daily       LIPITOR 40 MG tablet   Generic drug:  atorvastatin      Take 40 mg by mouth daily.       lisinopril 10 MG tablet    PRINIVIL/ZESTRIL     Take 1 tablet by mouth daily 5 mg total       metoprolol 25 MG 24 hr tablet    TOPROL-XL    90 tablet    Take 1 tablet (25 mg) by mouth daily       MULTIVITAMINS PO      Take 1 tablet by mouth daily.       nitroglycerin 0.4 MG sublingual tablet    NITROSTAT    25 tablet    Place 1 tablet (0.4 mg) under the tongue every 5 minutes as needed for chest pain       OMEGA 3 PO      Take  by mouth. 1 every two days       oxazepam 10 MG capsule    SERAX     Take 1 capsule by mouth daily.       PROLIA 60 MG/ML Soln injection   Generic drug:  denosumab      Inject 60 mg Subcutaneous once       ranitidine  150 MG tablet    ZANTAC     Take 1 tablet by mouth 2 times daily. In the morning and in the evening.

## 2017-05-02 NOTE — PROGRESS NOTES
SUBJECTIVE:  Shannon Krishna is a 76 year old female who presents for follow up.   of RCA with good collaterals. Normal stress MPI.  Aortic stenosis.    Remain asymptomatic.    Patient Active Problem List    Diagnosis Date Noted     Right knee pain 05/01/2017     Priority: Medium     Personal history of other drug therapy 08/16/2016     Priority: Medium     Breast cancer of upper-outer quadrant of right female breast (H) 12/16/2015     Priority: Medium     Osteoporosis 08/06/2013     Priority: Medium     Imo Update utility       Status post coronary angiogram 09/27/2012     Priority: Medium     Coronary artery disease 09/27/2012     Priority: Medium     S/P angioplasty 08/30/2012     Priority: Medium     CAD (coronary artery disease) 08/30/2012     Priority: Medium     Aortic sclerosis (H)      Priority: Medium     Hypertension      Priority: Medium     Hyperlipidemia      Priority: Medium     Esophageal reflux      Priority: Medium     Adenocarcinoma of breast (H)      Priority: Medium     Breast cancer (H) 03/16/2011     Priority: Medium    .  Current Outpatient Prescriptions   Medication Sig     denosumab (PROLIA) 60 MG/ML SOLN injection Inject 60 mg Subcutaneous once     diclofenac (VOLTAREN) 1 % GEL topical gel Apply 4 grams to knees or 2 grams to hands four times daily using enclosed dosing card.     metoprolol (TOPROL-XL) 25 MG 24 hr tablet Take 1 tablet (25 mg) by mouth daily     isosorbide mononitrate (IMDUR) 60 MG 24 hr tablet Take 1 tablet (60 mg) by mouth daily     anastrozole (ARIMIDEX) 1 MG tablet Take 1 tablet (1 mg) by mouth daily     nitroglycerin (NITROSTAT) 0.4 MG SL tablet Place 1 tablet (0.4 mg) under the tongue every 5 minutes as needed for chest pain     Omega-3 Fatty Acids (OMEGA 3 PO) Take  by mouth. 1 every two days     aspirin EC 81 MG EC tablet Take 1 tablet by mouth daily.     atorvastatin (LIPITOR) 40 MG tablet Take 40 mg by mouth daily.     amitriptyline (ELAVIL) 25 MG tablet  Take 1-2 tablets by mouth At Bedtime.     lisinopril (PRINIVIL,ZESTRIL) 10 MG tablet Take 1 tablet by mouth daily 5 mg total     Multiple Vitamin (MULTIVITAMINS PO) Take 1 tablet by mouth daily.     oxazepam (SERAX) 10 MG capsule Take 1 capsule by mouth daily.     ranitidine (ZANTAC) 150 MG tablet Take 1 tablet by mouth 2 times daily. In the morning and in the evening.      No current facility-administered medications for this visit.      Past Medical History:   Diagnosis Date     Adenocarcinoma of breast (H)      Aortic sclerosis (H)     Mild to moderate sclerosis without significant stenosis.     Coronary artery disease     Multivessel CAD,  of the mid RCA     Esophageal reflux      Hearing problem 2010     Hyperlipidemia      Hypertension      Osteoporosis      Past Surgical History:   Procedure Laterality Date     APPENDECTOMY      1950's     BIOPSY  2008    ultrasound guided biopsy of right breast     BREAST SURGERY       GENITOURINARY SURGERY  1970    cryosurgery on vagina     GENITOURINARY SURGERY  1975    conization     GI SURGERY  1960's    perforated ulcer     LUMPECTOMY BREAST  2008    breast cancer, done in [facility]     port-a-cath  2008    Removed in 2009     TONSILLECTOMY  1950     VASCULAR SURGERY       Allergies   Allergen Reactions     Nka [No Known Allergies]      Nkda [No Known Drug Allergies]      Social History     Social History     Marital status: Legally      Spouse name: N/A     Number of children: N/A     Years of education: N/A     Occupational History     Not on file.     Social History Main Topics     Smoking status: Former Smoker     Packs/day: 1.00     Years: 30.00     Types: Cigarettes     Start date: 1/1/1960     Quit date: 1/1/1990     Smokeless tobacco: Former User     Quit date: 6/7/1994     Alcohol use No     Drug use: No     Sexual activity: Not Currently     Birth control/ protection: None     Other Topics Concern     Not on file     Social History Narrative  "    Family History   Problem Relation Age of Onset     Family History Negative       Glaucoma No family hx of      Macular Degeneration No family hx of      Amblyopia No family hx of      CANCER Mother      DIABETES Father      HEART DISEASE Father      HEART DISEASE Brother      CEREBROVASCULAR DISEASE Father           REVIEW OF SYSTEMS:  General: negative, fever, chills, night sweats  Skin: negative, acne, rash and scaling  Eyes: negative, double vision, eye pain and photophobia  Ears/Nose/Throat: negative, nasal congestion and purulent rhinorrhea  Respiratory: No dyspnea on exertion, No cough, No hemoptysis and negative  Cardiovascular: negative, palpitations, tachycardia, irregular heart beat, chest pain, exertional chest pain or pressure, paroxysmal nocturnal dyspnea, dyspnea on exertion and orthopnea         OBJECTIVE:  Blood pressure 105/68, pulse 89, height 1.664 m (5' 5.5\"), weight 69 kg (152 lb 1.6 oz), SpO2 96 %.  General Appearance: alert, active and no distress  Head: Normocephalic. No masses, lesions, tenderness or abnormalities  Eyes: conjuctiva clear, PERRL, EOM intact  Ears: External ears normal. Canals clear. TM's normal.  Nose: Nares normal  Mouth: normal  Neck: Supple, no cervical adenopathy, no thyromegaly  Lungs: clear to auscultation  Cardiac: regular rate and rhythm, normal S1 and S2, ESM.         ASSESSMENT/PLAN:  76 yr old female with  of RCA and Aortic stenosis. No symptoms.  Reviewed echo done today. Normal LV function. No RWMA. Mild AS. Mean gradient 21mmHg. Mild increase from before.Ascending aorta 3cms.  Continue current meds.  Per orders.   Return to Clinic 1yr with echo.  Answers for HPI/ROS submitted by the patient on 4/19/2017   General Symptoms: No  Skin Symptoms: No  HENT Symptoms: No  EYE SYMPTOMS: No  HEART SYMPTOMS: No  LUNG SYMPTOMS: No  INTESTINAL SYMPTOMS: No  URINARY SYMPTOMS: No  GYNECOLOGIC SYMPTOMS: No  BREAST SYMPTOMS: No  SKELETAL SYMPTOMS: Yes  BLOOD SYMPTOMS: " No  NERVOUS SYSTEM SYMPTOMS: No  MENTAL HEALTH SYMPTOMS: No  Back pain: No  Muscle aches: No  Neck pain: No  Swollen joints: No  Joint pain: Yes  Bone pain: Yes  Muscle cramps: No  Muscle weakness: No  Joint stiffness: No  Bone fracture: No

## 2017-05-03 DIAGNOSIS — M17.11 PRIMARY OSTEOARTHRITIS OF RIGHT KNEE: Primary | ICD-10-CM

## 2017-05-08 ENCOUNTER — THERAPY VISIT (OUTPATIENT)
Dept: PHYSICAL THERAPY | Facility: CLINIC | Age: 77
End: 2017-05-08
Payer: MEDICARE

## 2017-05-08 DIAGNOSIS — M25.561 RIGHT KNEE PAIN, UNSPECIFIED CHRONICITY: ICD-10-CM

## 2017-05-08 PROCEDURE — 97112 NEUROMUSCULAR REEDUCATION: CPT | Mod: GP | Performed by: PHYSICAL THERAPIST

## 2017-05-08 PROCEDURE — 97110 THERAPEUTIC EXERCISES: CPT | Mod: GP | Performed by: PHYSICAL THERAPIST

## 2017-05-11 ENCOUNTER — TELEPHONE (OUTPATIENT)
Dept: ORTHOPEDICS | Facility: CLINIC | Age: 77
End: 2017-05-11

## 2017-05-11 NOTE — TELEPHONE ENCOUNTER
Patient called in saying she would like to  the cane that Dr. Baker ordered. She is going to [facility] in [place]. She called them and they said they could not view the order so it was faxed over [phone].

## 2017-05-22 ENCOUNTER — THERAPY VISIT (OUTPATIENT)
Dept: PHYSICAL THERAPY | Facility: CLINIC | Age: 77
End: 2017-05-22
Payer: MEDICARE

## 2017-05-22 DIAGNOSIS — M25.561 RIGHT KNEE PAIN, UNSPECIFIED CHRONICITY: ICD-10-CM

## 2017-05-22 PROCEDURE — G8980 MOBILITY D/C STATUS: HCPCS | Mod: GP | Performed by: PHYSICAL THERAPIST

## 2017-05-22 PROCEDURE — G8979 MOBILITY GOAL STATUS: HCPCS | Mod: GP | Performed by: PHYSICAL THERAPIST

## 2017-05-22 PROCEDURE — 97112 NEUROMUSCULAR REEDUCATION: CPT | Mod: GP | Performed by: PHYSICAL THERAPIST

## 2017-05-22 ASSESSMENT — ACTIVITIES OF DAILY LIVING (ADL)
PAIN: I DO NOT HAVE THE SYMPTOM
SWELLING: I DO NOT HAVE THE SYMPTOM
HOW_WOULD_YOU_RATE_THE_OVERALL_FUNCTION_OF_YOUR_KNEE_DURING_YOUR_USUAL_DAILY_ACTIVITIES?: NEARLY NORMAL
GIVING WAY, BUCKLING OR SHIFTING OF KNEE: I DO NOT HAVE THE SYMPTOM
SQUAT: ACTIVITY IS MINIMALLY DIFFICULT
AS_A_RESULT_OF_YOUR_KNEE_INJURY,_HOW_WOULD_YOU_RATE_YOUR_CURRENT_LEVEL_OF_DAILY_ACTIVITY?: NEARLY NORMAL
SIT WITH YOUR KNEE BENT: ACTIVITY IS NOT DIFFICULT
HOW_WOULD_YOU_RATE_THE_CURRENT_FUNCTION_OF_YOUR_KNEE_DURING_YOUR_USUAL_DAILY_ACTIVITIES_ON_A_SCALE_FROM_0_TO_100_WITH_100_BEING_YOUR_LEVEL_OF_KNEE_FUNCTION_PRIOR_TO_YOUR_INJURY_AND_0_BEING_THE_INABILITY_TO_PERFORM_ANY_OF_YOUR_USUAL_DAILY_ACTIVITIES?: 50
KNEE_ACTIVITY_OF_DAILY_LIVING_SUM: 55
RISE FROM A CHAIR: ACTIVITY IS SOMEWHAT DIFFICULT
KNEE_ACTIVITY_OF_DAILY_LIVING_SCORE: 78.57
LIMPING: I HAVE THE SYMPTOM BUT IT DOES NOT AFFECT MY ACTIVITY
WEAKNESS: THE SYMPTOM AFFECTS MY ACTIVITY SLIGHTLY
GO UP STAIRS: ACTIVITY IS SOMEWHAT DIFFICULT
KNEEL ON THE FRONT OF YOUR KNEE: ACTIVITY IS MINIMALLY DIFFICULT
WALK: ACTIVITY IS SOMEWHAT DIFFICULT
GO DOWN STAIRS: ACTIVITY IS FAIRLY DIFFICULT
RAW_SCORE: 55
STAND: ACTIVITY IS NOT DIFFICULT
STIFFNESS: I HAVE THE SYMPTOM BUT IT DOES NOT AFFECT MY ACTIVITY

## 2017-05-22 NOTE — MR AVS SNAPSHOT
After Visit Summary   5/22/2017    Shannon Krishna    MRN: [id number]           Patient Information     Date Of Birth          1940        Visit Information        Provider Department      5/22/2017 1:10 PM Diana Peralta PT [facility]        Today's Diagnoses     Right knee pain, unspecified chronicity           Follow-ups after your visit        Your next 10 appointments already scheduled     May 24, 2017 11:30 AM CDT   (Arrive by 11:15 AM)   Return Visit with Davon Lara MD   [facility] ([facility])    [address] [place] [address]  4th [facility] [address]   [phone]            Aug 29, 2017 12:00 PM CDT   [facility] Lab Draw with  Gravitant LAB DRAW   [facility] Lab Draw ([facility])    [address] [place] [id number] [phone]            Aug 29, 2017 12:30 PM CDT   (Arrive by 12:15 PM)   Return Visit with Elias Caal MD   [facility] ([facility])    [address] [place] [id number] [phone]              Who to contact     If you have questions or need follow up information about today's clinic visit or your schedule please contact [facility] directly at [phone].  Normal or non-critical lab and imaging results will be communicated to you by MyChart, letter or phone within 4 business days after the clinic has received the results. If you do not hear from us within 7 days, please contact the clinic through MyChart or phone. If you have a critical or abnormal lab result, we will notify you by phone as soon as possible.  Submit refill requests through Iptune or call your pharmacy and they will forward the refill request to us. Please allow 3 business days for your refill to be completed.          Additional Information About Your Visit         Safe [employer] Clear Information     [employer] gives you secure access to your electronic health record. If you see a primary care provider, you can also send messages to your care team and make appointments. If you have questions, please call your primary care clinic.  If you do not have a primary care provider, please call [phone] and they will assist you.        Care EveryWhere ID     This is your Care EveryWhere ID. This could be used by other organizations to access your [place] medical records  [id number]         Blood Pressure from Last 3 Encounters:   05/02/17 105/68   04/18/17 127/64   02/15/17 103/62    Weight from Last 3 Encounters:   05/02/17 69 kg (152 lb 1.6 oz)   11/23/16 68.6 kg (151 lb 3.2 oz)   08/16/16 65.9 kg (145 lb 4.8 oz)              We Performed the Following     NEUROMUSCULAR RE-EDUCATION        Primary Care Provider Office Phone # Fax #    Valeria Valdivia Gail [phone]       [address] [address]        Thank you!     Thank you for choosing [facility]  for your care. Our goal is always to provide you with excellent care. Hearing back from our patients is one way we can continue to improve our services. Please take a few minutes to complete the written survey that you may receive in the mail after your visit with us. Thank you!             Your Updated Medication List - Protect others around you: Learn how to safely use, store and throw away your medicines at www.disposemymeds.org.          This list is accurate as of: 5/22/17  1:39 PM.  Always use your most recent med list.                   Brand Name Dispense Instructions for use    amitriptyline 25 MG tablet    ELAVIL     Take 1-2 tablets by mouth At Bedtime.       anastrozole 1 MG tablet    ARIMIDEX    90 tablet    Take 1 tablet (1 mg) by mouth daily       aspirin 81 MG EC tablet      Take 1 tablet by mouth daily.       diclofenac 1 % Gel topical  gel    VOLTAREN    100 g    Apply 4 grams to knees or 2 grams to hands four times daily using enclosed dosing card.       isosorbide mononitrate 60 MG 24 hr tablet    IMDUR    91 tablet    Take 1 tablet (60 mg) by mouth daily       LIPITOR 40 MG tablet   Generic drug:  atorvastatin      Take 40 mg by mouth daily.       lisinopril 10 MG tablet    PRINIVIL/ZESTRIL     Take 1 tablet by mouth daily 5 mg total       metoprolol 25 MG 24 hr tablet    TOPROL-XL    90 tablet    Take 1 tablet (25 mg) by mouth daily       MULTIVITAMINS PO      Take 1 tablet by mouth daily.       nitroglycerin 0.4 MG sublingual tablet    NITROSTAT    25 tablet    Place 1 tablet (0.4 mg) under the tongue every 5 minutes as needed for chest pain       OMEGA 3 PO      Take  by mouth. 1 every two days       oxazepam 10 MG capsule    SERAX     Take 1 capsule by mouth daily.       PROLIA 60 MG/ML Soln injection   Generic drug:  denosumab      Inject 60 mg Subcutaneous once       ranitidine 150 MG tablet    ZANTAC     Take 1 tablet by mouth 2 times daily. In the morning and in the evening.

## 2017-05-22 NOTE — PROGRESS NOTES
Subjective:    HPI       Knee Activity of Daily Living Score: 78.57            Objective:    System    Physical Exam    General     ROS    Assessment/Plan:      DISCHARGE REPORT    Progress reporting period is from 5/1/17 to 5/22/17.       SUBJECTIVE  Subjective: Patient states that her knee is doing very well.  She continues to have some pain on stairs.  She feels more steady.  She acquired a cane, but feels that she doesn't need to use it.    Initial Pain level: 6/10.   Changes in function:  Yes (See Goal flowsheet attached for changes in current functional level)  Adverse reaction to treatment or activity: None    OBJECTIVE  Changes noted in objective findings:  Yes  Objective: SLS 2 seconds B.  Improvement in stability with ambulation - no loss of balance noted today.  R knee AROM: 0-0-120.  Fair quad set.  MMT gluteus medius: 4/5 B.     ASSESSMENT/PLAN  Updated problem list and treatment plan: Diagnosis 1:  Knee OA    STG/LTGs have been met or progress has been made towards goals:  Yes (See Goal flow sheet completed today.)  Assessment of Progress: The patient's condition is improving.  Self Management Plans:  Patient has been instructed in a home treatment program.  I have re-evaluated this patient and find that the nature, scope, duration and intensity of the therapy is appropriate for the medical condition of the patient.  Shannon continues to require the following intervention to meet STG and LTG's:  PT intervention is no longer required to meet STG/LTG.    Recommendations:  This patient is ready to be discharged from therapy and continue their home treatment program.    Please refer to the daily flowsheet for treatment today, total treatment time and time spent performing 1:1 timed codes.

## 2017-05-24 ENCOUNTER — OFFICE VISIT (OUTPATIENT)
Dept: OTOLARYNGOLOGY | Facility: CLINIC | Age: 77
End: 2017-05-24

## 2017-05-24 ENCOUNTER — OFFICE VISIT (OUTPATIENT)
Dept: AUDIOLOGY | Facility: CLINIC | Age: 77
End: 2017-05-24

## 2017-05-24 DIAGNOSIS — H61.23 BILATERAL IMPACTED CERUMEN: Primary | ICD-10-CM

## 2017-05-24 DIAGNOSIS — H90.3 SENSORY HEARING LOSS, BILATERAL: Primary | ICD-10-CM

## 2017-05-24 ASSESSMENT — PAIN SCALES - GENERAL: PAINLEVEL: NO PAIN (0)

## 2017-05-24 NOTE — NURSING NOTE
Chief Complaint   Patient presents with     RECHECK     Six month ear cleaning     Oneil Goznalez

## 2017-05-24 NOTE — PROGRESS NOTES
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  She periodically comes in and has her ear cleaned out for her cerumen impactions.  The patient otherwise has been doing reasonably well.  She has not had any other new medical problems.  Her list is extensive, including coronary artery disease and a history of breast cancer.      PHYSICAL EXAMINATION:  Examination today shows that she has bilateral cerumen impactions.  Under the operating microscope using a right angle alligator, forceps and curette, I was able to remove both cerumen impactions.  The canals and drum are otherwise healthy and normal.      PLAN:  I have been cleaning her ear roughly every six months.  We will continue that protocol.      ROSALBA/bharat

## 2017-05-24 NOTE — LETTER
5/24/2017       RE: Shannon Krishna  [address]   [facility] [address]     Dear Colleague,    Thank you for referring your patient, Shannon Krishna, to the [facility] E[facility] at [facility]. Please see a copy of my visit note below.    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  She periodically comes in and has her ear cleaned out for her cerumen impactions.  The patient otherwise has been doing reasonably well.  She has not had any other new medical problems.  Her list is extensive, including coronary artery disease and a history of breast cancer.      PHYSICAL EXAMINATION:  Examination today shows that she has bilateral cerumen impactions.  Under the operating microscope using a right angle alligator, forceps and curette, I was able to remove both cerumen impactions.  The canals and drum are otherwise healthy and normal.      PLAN:  I have been cleaning her ear roughly every six months.  We will continue that protocol.      ROSALBA/bharat         Again, thank you for allowing me to participate in the care of your patient.      Sincerely,    Davon Lara MD

## 2017-05-24 NOTE — PATIENT INSTRUCTIONS
You will need  to schedule a follow up appointment in 6 months for ear cleaning.   Please call our clinic for any questions,concerns,or worsening symptoms.      Clinic #[phone]       Option 3  for the triage nurse.

## 2017-05-24 NOTE — MR AVS SNAPSHOT
After Visit Summary   5/24/2017    Shannon Krishna    MRN: [id number]           Patient Information     Date Of Birth          1940        Visit Information        Provider Department      5/24/2017 11:30 AM Davon Lara MD [facility] Ear Nose [facility] Throat         Follow-ups after your visit        Your next 10 appointments already scheduled     Aug 29, 2017 12:00 PM CDT   [facility] Lab Draw with  MASONIC LAB DRAW   [facility] Lab Draw ([facility])    [address] [place] [id number]   [phone]            Aug 29, 2017 12:30 PM CDT   (Arrive by 12:15 PM)   Return Visit with Elias Caal MD   [facility] ([facility])    [address] [place] [phone]   [phone]            Nov 15, 2017 11:30 AM CST   (Arrive by 11:15 AM)   Return Visit with Davon Lara MD   [facility] ([facility])    [address] [place] [id number] [phone]              Who to contact     Please call your clinic at [phone] to:    Ask questions about your health    Make or cancel appointments    Discuss your medicines    Learn about your test results    Speak to your doctor   If you have compliments or concerns about an experience at your clinic, or if you wish to file a complaint, please contact [facility] Patient Relations at [phone] or email us at [email or web address]         Additional Information About Your Visit        MyChart Information     Appydrinkhart gives you secure access to your electronic health record. If you see a primary care provider, you can also send messages to your care team and make appointments. If you have questions, please call your primary care clinic.  If you do not have a primary care provider, please call [phone] and they will  assist you.      Magnetic is an electronic gateway that provides easy, online access to your medical records. With Magnetic, you can request a clinic appointment, read your test results, renew a prescription or communicate with your care team.     To access your existing account, please contact your [facility] or call [phone] for assistance.        Care EveryWhere ID     This is your Care EveryWhere ID. This could be used by other organizations to access your [place] medical records  [id number]         Blood Pressure from Last 3 Encounters:   05/02/17 105/68   04/18/17 127/64   02/15/17 103/62    Weight from Last 3 Encounters:   05/02/17 69 kg (152 lb 1.6 oz)   11/23/16 68.6 kg (151 lb 3.2 oz)   08/16/16 65.9 kg (145 lb 4.8 oz)              Today, you had the following     No orders found for display       Primary Care Provider Office Phone # Fax #    Valeria NAVARRETE [facility] [phone]       [address] [address]        Thank you!     Thank you for choosing [facility]SE [facility] TH[facility]AT  for your care. Our goal is always to provide you with excellent care. Hearing back from our patients is one way we can continue to improve our services. Please take a few minutes to complete the written survey that you may receive in the mail after your visit with us. Thank you!             Your Updated Medication List - Protect others around you: Learn how to safely use, store and throw away your medicines at www.disposemymeds.org.          This list is accurate as of: 5/24/17 11:31 AM.  Always use your most recent med list.                   Brand Name Dispense Instructions for use    amitriptyline 25 MG tablet    ELAVIL     Take 1-2 tablets by mouth At Bedtime.       anastrozole 1 MG tablet    ARIMIDEX    90 tablet    Take 1 tablet (1 mg) by mouth daily       aspirin 81 MG EC tablet      Take 1 tablet by mouth daily.        diclofenac 1 % Gel topical gel    VOLTAREN    100 g    Apply 4 grams to knees or 2 grams to hands four times daily using enclosed dosing card.       isosorbide mononitrate 60 MG 24 hr tablet    IMDUR    91 tablet    Take 1 tablet (60 mg) by mouth daily       LIPITOR 40 MG tablet   Generic drug:  atorvastatin      Take 40 mg by mouth daily.       lisinopril 10 MG tablet    PRINIVIL/ZESTRIL     Take 1 tablet by mouth daily 5 mg total       metoprolol 25 MG 24 hr tablet    TOPROL-XL    90 tablet    Take 1 tablet (25 mg) by mouth daily       MULTIVITAMINS PO      Take 1 tablet by mouth daily.       nitroglycerin 0.4 MG sublingual tablet    NITROSTAT    25 tablet    Place 1 tablet (0.4 mg) under the tongue every 5 minutes as needed for chest pain       OMEGA 3 PO      Take  by mouth. 1 every two days       oxazepam 10 MG capsule    SERAX     Take 1 capsule by mouth daily.       PROLIA 60 MG/ML Soln injection   Generic drug:  denosumab      Inject 60 mg Subcutaneous once       ranitidine 150 MG tablet    ZANTAC     Take 1 tablet by mouth 2 times daily. In the morning and in the evening.

## 2017-08-27 NOTE — PROGRESS NOTES
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 She was treated with right lumpectomy, sentinel lymph node dissection which was negative for malignancy. This was performed July 2008. She was treated with adjuvant TCH Taxotere, carboplatin and Herceptin for six cycles ending December 2008. She completed a full year of Herceptin and is status post breast radiation therapy completed February 2009. She has been on Arimidex since March of 2009. She had fibrosis of the right breast surrounding skin erythema and lymphedema. Biopsy of the skin of the right breast performed 14 months ago showed no evidence of recurrence. Repeat biopsy performed 05/06/2011 showed skin with dermal chronic inflammation fibrosis, probable radiation changes and focal subcutaneous fat necrosis with no evidence of malignancy. The patient underwent head CT scan on Arelis 3 which showed thickening of the skin along the right breast, focal area of mild FTG activity within the right breast tissue which could be treatment related and an 8 x 5.8 lobulated mildly hypermetabolic lesion in continuity with vascular structures. Also of note was an area of FTG mild uptake along the chest wall under the right breast. A CT guided needle biopsy of this area was performed on 06/10/2011 and the biopsy pathology showed dense fibrosis consistent with scar, scant benign breast tissue, skeletal muscle and no evidence of malignancy. There was dermal chronic inflammation fibrosis, probable radiation changes and focal subcutaneous fat necrosis with no evidence of malignancy. The patient underwent head CT scan on Arelis 3 which showed thickening of the skin along the right breast, focal area of mild FTG activity within the right breast tissue which could be treatment related and an 8 x  5.8 lobulated mildly hypermetabolic lesion in continuity with vascular structures. Also of note was an area of FTG mild uptake along the chest wall under the right breast. A CT guided needle biopsy of this area was performed on 06/10/2011 and the biopsy pathology showed dense fibrosis consistent with scar, scant benign breast tissue, skeletal muscle and no evidence of malignancy.   SUMMARY OF DIAGNOSIS:  Stage I (T1 N0 M0) infiltrating ductal carcinoma of the right breast. She was originally diagnosed with a right ultrasound-guided biopsy in June of 2008. This revealed an infiltrating ductal carcinoma, Brijesh grade 2 of 3, ER-positive, ND-positive, HER2/alfredito 3+. She went on to receive a right lumpectomy with a sentinel lymph node dissection in July of 2008. [place] lymph nodes were negative for malignancy. She began adjuvant TCH (Taxotere, carboplatin, and Herceptin) and received 6 cycles of chemotherapy, completed in December of 2008. She then went on to complete a whole full year or Herceptin. She is status post breast radiation completed in February of 2009. She has been on Arimidex since March of 2009.      FOLLOWUP NOTE         I spent ... minutes with the patient more than 50% of which was in counseling and coordination of care.

## 2017-08-29 ENCOUNTER — ONCOLOGY VISIT (OUTPATIENT)
Dept: ONCOLOGY | Facility: CLINIC | Age: 77
End: 2017-08-29
Attending: INTERNAL MEDICINE
Payer: MEDICARE

## 2017-08-29 ENCOUNTER — APPOINTMENT (OUTPATIENT)
Dept: LAB | Facility: CLINIC | Age: 77
End: 2017-08-29
Attending: INTERNAL MEDICINE
Payer: MEDICARE

## 2017-08-29 VITALS
DIASTOLIC BLOOD PRESSURE: 59 MMHG | RESPIRATION RATE: 18 BRPM | HEART RATE: 80 BPM | WEIGHT: 151.5 LBS | BODY MASS INDEX: 24.83 KG/M2 | OXYGEN SATURATION: 97 % | SYSTOLIC BLOOD PRESSURE: 109 MMHG | TEMPERATURE: 98.3 F

## 2017-08-29 DIAGNOSIS — M81.0 OSTEOPOROSIS, UNSPECIFIED OSTEOPOROSIS TYPE, UNSPECIFIED PATHOLOGICAL FRACTURE PRESENCE: Primary | ICD-10-CM

## 2017-08-29 DIAGNOSIS — C50.411 BREAST CANCER OF UPPER-OUTER QUADRANT OF RIGHT FEMALE BREAST (H): ICD-10-CM

## 2017-08-29 LAB
ALBUMIN SERPL-MCNC: 3.5 G/DL (ref 3.4–5)
ALP SERPL-CCNC: 70 U/L (ref 40–150)
ALT SERPL W P-5'-P-CCNC: 23 U/L (ref 0–50)
ANION GAP SERPL CALCULATED.3IONS-SCNC: 9 MMOL/L (ref 3–14)
AST SERPL W P-5'-P-CCNC: 14 U/L (ref 0–45)
BASOPHILS # BLD AUTO: 0.1 10E9/L (ref 0–0.2)
BASOPHILS NFR BLD AUTO: 0.7 %
BILIRUB SERPL-MCNC: 0.5 MG/DL (ref 0.2–1.3)
BUN SERPL-MCNC: 15 MG/DL (ref 7–30)
CALCIUM SERPL-MCNC: 8.7 MG/DL (ref 8.5–10.1)
CHLORIDE SERPL-SCNC: 101 MMOL/L (ref 94–109)
CO2 SERPL-SCNC: 23 MMOL/L (ref 20–32)
CREAT SERPL-MCNC: 0.73 MG/DL (ref 0.52–1.04)
DIFFERENTIAL METHOD BLD: NORMAL
EOSINOPHIL # BLD AUTO: 0.1 10E9/L (ref 0–0.7)
EOSINOPHIL NFR BLD AUTO: 1.2 %
ERYTHROCYTE [DISTWIDTH] IN BLOOD BY AUTOMATED COUNT: 13.8 % (ref 10–15)
GFR SERPL CREATININE-BSD FRML MDRD: 77 ML/MIN/1.7M2
GLUCOSE SERPL-MCNC: 97 MG/DL (ref 70–99)
HCT VFR BLD AUTO: 39.5 % (ref 35–47)
HGB BLD-MCNC: 13.2 G/DL (ref 11.7–15.7)
IMM GRANULOCYTES # BLD: 0 10E9/L (ref 0–0.4)
IMM GRANULOCYTES NFR BLD: 0.3 %
LYMPHOCYTES # BLD AUTO: 1.5 10E9/L (ref 0.8–5.3)
LYMPHOCYTES NFR BLD AUTO: 20.2 %
MCH RBC QN AUTO: 29.5 PG (ref 26.5–33)
MCHC RBC AUTO-ENTMCNC: 33.4 G/DL (ref 31.5–36.5)
MCV RBC AUTO: 88 FL (ref 78–100)
MONOCYTES # BLD AUTO: 0.5 10E9/L (ref 0–1.3)
MONOCYTES NFR BLD AUTO: 7.4 %
NEUTROPHILS # BLD AUTO: 5.1 10E9/L (ref 1.6–8.3)
NEUTROPHILS NFR BLD AUTO: 70.2 %
NRBC # BLD AUTO: 0 10*3/UL
NRBC BLD AUTO-RTO: 0 /100
PLATELET # BLD AUTO: 249 10E9/L (ref 150–450)
POTASSIUM SERPL-SCNC: 4.1 MMOL/L (ref 3.4–5.3)
PROT SERPL-MCNC: 7 G/DL (ref 6.8–8.8)
RBC # BLD AUTO: 4.47 10E12/L (ref 3.8–5.2)
SODIUM SERPL-SCNC: 133 MMOL/L (ref 133–144)
WBC # BLD AUTO: 7.3 10E9/L (ref 4–11)

## 2017-08-29 PROCEDURE — 80053 COMPREHEN METABOLIC PANEL: CPT | Performed by: INTERNAL MEDICINE

## 2017-08-29 PROCEDURE — 99214 OFFICE O/P EST MOD 30 MIN: CPT | Mod: GC | Performed by: INTERNAL MEDICINE

## 2017-08-29 PROCEDURE — 85025 COMPLETE CBC W/AUTO DIFF WBC: CPT | Performed by: INTERNAL MEDICINE

## 2017-08-29 PROCEDURE — 99212 OFFICE O/P EST SF 10 MIN: CPT | Mod: ZF

## 2017-08-29 PROCEDURE — 36415 COLL VENOUS BLD VENIPUNCTURE: CPT

## 2017-08-29 ASSESSMENT — PAIN SCALES - GENERAL: PAINLEVEL: NO PAIN (0)

## 2017-08-29 NOTE — MR AVS SNAPSHOT
"              After Visit Summary   8/29/2017    Shannon Krishna    MRN: [id number]           Patient Information     Date Of Birth          1940        Visit Information        Provider Department      8/29/2017 12:30 PM Elias Caal MD [facility]        Today's Diagnoses     Osteoporosis, unspecified osteoporosis type, unspecified pathological fracture presence    -  1    Breast cancer of upper-outer quadrant of right female breast (H)           Follow-ups after your visit        Follow-up notes from your care team     Return in about 6 months (around 2/28/2018) for Physical Exam.      Your next 10 appointments already scheduled     Nov 15, 2017 11:30 AM CST   (Arrive by 11:15 AM)   Return Visit with Davon Lara MD   [facility] ([facility])    [address] [place] [id number]   [phone]            Feb 27, 2018  1:00 PM CST   (Arrive by 12:45 PM)   MA DIAGNOSTIC BILATERAL W/ VIELKA with [id number]   [facility] ([facility])    [address] [place] [id number] [phone]           Do not use any powder, lotion or deodorant under your arms or on your breast. If you do, we will ask you to remove it before your exam.  Wear comfortable, two-piece clothing.  If you have any allergies, tell your care team.  Bring any previous mammograms from other facilities or have them mailed to the breast center.  Three-dimensional (3D) mammograms are available at [facility] locations in [facility], [facility], [facility], and [place]. [facility] locations include [place] [facility] in [place]. Benefits of 3D mammograms include: - Improved rate of cancer detection - Decreases your chance of having to go back for more tests, which means fewer: - \"False-positive\" results (This means that there " is an abnormal area but it isn't cancer.) - Invasive testing procedures, such as a biopsy or surgery - Can provide clearer images of the breast if you have dense breast tissue. 3D mammography is an optional exam that anyone can have with a 2D mammogram. It doesn't replace or take the place of a 2D mammogram. 2D mammograms remain an effective screening test for all women.  Not all insurance companies cover the cost of a 3D mammogram. Check with your insurance.            Feb 27, 2018  1:40 PM CST   (Arrive by 1:25 PM)   Return Visit with Darcy Bruce PA-C   [facility] ([facility])    [address] [place] [id number] [phone]            Aug 28, 2018  2:00 PM CDT   (Arrive by 1:45 PM)   Return Visit with Elias Caal MD   [facility] ([facility])    [address] [place] [id number] [phone]              Who to contact     If you have questions or need follow up information about today's clinic visit or your schedule please contact [facility] directly at [phone].  Normal or non-critical lab and imaging results will be communicated to you by Vicarious[employer], letter or phone within 4 business days after the clinic has received the results. If you do not hear from us within 7 days, please contact the clinic through Vicarioushart or phone. If you have a critical or abnormal lab result, we will notify you by phone as soon as possible.  Submit refill requests through "[employer] or call your pharmacy and they will forward the refill request to us. Please allow 3 business days for your refill to be completed.          Additional Information About Your Visit        "[employer] Information     "[employer] gives you secure access to your electronic health record. If you see a primary care provider, you can also send messages to your care team and make  appointments. If you have questions, please call your primary care clinic.  If you do not have a primary care provider, please call [phone] and they will assist you.        Care EveryWhere ID     This is your Care EveryWhere ID. This could be used by other organizations to access your [facility] medical records  [id number]        Your Vitals Were     Pulse Temperature Respirations Pulse Oximetry BMI (Body Mass Index)       80 98.3  F (36.8  C) (Oral) 18 97% 24.83 kg/m2        Blood Pressure from Last 3 Encounters:   08/29/17 109/59   05/02/17 105/68   04/18/17 127/64    Weight from Last 3 Encounters:   08/29/17 68.7 kg (151 lb 8 oz)   05/02/17 69 kg (152 lb 1.6 oz)   11/23/16 68.6 kg (151 lb 3.2 oz)              We Performed the Following     CBC with platelets differential     Comprehensive metabolic panel        Primary Care Provider Office Phone # Fax #    Valeria NAVARRETE [facility] [phone]       [address] [facility] [address]        Equal Access to Services     GABRIELLA REGALADO : Hadii aad ku hadasho Soomaali, waaxda luqadaha, qaybta kaalmada adejanesyada, lul collins . So [facility] [phone].    ATENCIÓN: Si habla español, tiene a ang disposición servicios gratuitos de asistencia lingüística. [facility] [phone].    We comply with applicable federal civil rights laws and [place] laws. We do not discriminate on the basis of race, color, national origin, age, disability, sex, sexual orientation, or gender identity.            Thank you!     Thank you for choosing [facility]  for your care. Our goal is always to provide you with excellent care. Hearing back from our patients is one way we can continue to improve our services. Please take a few minutes to complete the written survey that you may receive in the mail after your visit with us. Thank you!             Your Updated Medication List - Protect others  around you: Learn how to safely use, store and throw away your medicines at www.disposemymeds.org.          This list is accurate as of: 8/29/17 11:59 PM.  Always use your most recent med list.                   Brand Name Dispense Instructions for use Diagnosis    amitriptyline 25 MG tablet    ELAVIL     Take 1-2 tablets by mouth At Bedtime.        anastrozole 1 MG tablet    ARIMIDEX    90 tablet    Take 1 tablet (1 mg) by mouth daily    Breast cancer of upper-outer quadrant of right female breast (H)       aspirin 81 MG EC tablet      Take 1 tablet by mouth daily.    CAD (coronary artery disease)       diclofenac 1 % Gel topical gel    VOLTAREN    100 g    Apply 4 grams to knees or 2 grams to hands four times daily using enclosed dosing card.    Primary osteoarthritis of right knee       isosorbide mononitrate 60 MG 24 hr tablet    IMDUR    91 tablet    Take 1 tablet (60 mg) by mouth daily    CAD (coronary artery disease)       LIPITOR 40 MG tablet   Generic drug:  atorvastatin      Take 40 mg by mouth daily.        lisinopril 10 MG tablet    PRINIVIL/ZESTRIL     Take 1 tablet by mouth daily 5 mg total        metoprolol 25 MG 24 hr tablet    TOPROL-XL    90 tablet    Take 1 tablet (25 mg) by mouth daily    CAD (coronary artery disease)       MULTIVITAMINS PO      Take 1 tablet by mouth daily.        nitroGLYcerin 0.4 MG sublingual tablet    NITROSTAT    25 tablet    Place 1 tablet (0.4 mg) under the tongue every 5 minutes as needed for chest pain    CAD (coronary artery disease)       OMEGA 3 PO      Take  by mouth. 1 every two days        oxazepam 10 MG capsule    SERAX     Take 1 capsule by mouth daily.        PROLIA 60 MG/ML Soln injection   Generic drug:  denosumab      Inject 60 mg Subcutaneous once        ranitidine 150 MG tablet    ZANTAC     Take 1 tablet by mouth 2 times daily. In the morning and in the evening.

## 2017-08-29 NOTE — NURSING NOTE
Chief Complaint   Patient presents with     Blood Draw     Labs drawn via  by RN, VS taken     Labs drawn via  - 23G. Warm pack used. Patient tolerated procedure.    Karen Swenson RN

## 2017-08-29 NOTE — NURSING NOTE
"Oncology Rooming Note    August 29, 2017 12:06 PM   Shannon Krishna is a 76 year old female who presents for:    Chief Complaint   Patient presents with     Blood Draw     Labs drawn via  by RN, STEVEN taken     Oncology Clinic Visit     Return-6 mos f/u & prolia     Initial Vitals: /59 (BP Location: Right arm, Patient Position: Chair, Cuff Size: Adult Regular)  Pulse 80  Temp 98.3  F (36.8  C) (Oral)  Resp 18  Wt 68.7 kg (151 lb 8 oz)  SpO2 97%  BMI 24.83 kg/m2 Estimated body mass index is 24.83 kg/(m^2) as calculated from the following:    Height as of 5/2/17: 1.664 m (5' 5.5\").    Weight as of this encounter: 68.7 kg (151 lb 8 oz). Body surface area is 1.78 meters squared.  No Pain (0) Comment: Data Unavailable   No LMP recorded. Patient is postmenopausal.  Allergies reviewed: Yes  Medications reviewed: Yes    Medications: Medication refills not needed today.  Pharmacy name entered into [employer]:    [facility] - [place], [place] - [address] [facility]  WRITTEN PRESCRIPTION REQUESTED  [facility] [place], [place] - [address] [place] [address] [phone]    Clinical concerns: No new concerns    6 minutes for nursing intake (face to face time)     Gwen Gagnon LPN            "

## 2017-08-29 NOTE — LETTER
8/29/2017       RE: Shannon Krishna  [address]   [facility] [address]     Dear Colleague,    Thank you for referring your patient, Shannon Krishna, to the [facility]. Please see a copy of my visit note below.      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 She was treated with right lumpectomy, sentinel lymph node dissection which was negative for malignancy. This was performed July 2008. She was treated with adjuvant H Taxotere, carboplatin and Herceptin for six cycles ending December 2008. She completed a full year of Herceptin and is status post breast radiation therapy completed February 2009. She has been on Arimidex since March of 2009. She had fibrosis of the right breast surrounding skin erythema and lymphedema. Biopsy of the skin of the right breast performed 14 months ago showed no evidence of recurrence. Repeat biopsy performed 05/06/2011 showed skin with dermal chronic inflammation fibrosis, probable radiation changes and focal subcutaneous fat necrosis with no evidence of malignancy. The patient underwent head CT scan on Arelis 3 which showed thickening of the skin along the right breast, focal area of mild FTG activity within the right breast tissue which could be treatment related and an 8 x 5.8 lobulated mildly hypermetabolic lesion in continuity with vascular structures. Also of note was an area of FTG mild uptake along the chest wall under the right breast. A CT guided needle biopsy of this area was performed on 06/10/2011 and the biopsy pathology showed dense fibrosis consistent with scar, scant benign breast tissue, skeletal muscle and no evidence of malignancy. There was dermal chronic inflammation fibrosis, probable radiation changes and focal subcutaneous fat  necrosis with no evidence of malignancy. The patient underwent head CT scan on Arelis 3 which showed thickening of the skin along the right breast, focal area of mild FTG activity within the right breast tissue which could be treatment related and an 8 x 5.8 lobulated mildly hypermetabolic lesion in continuity with vascular structures. Also of note was an area of FTG mild uptake along the chest wall under the right breast. A CT guided needle biopsy of this area was performed on 06/10/2011 and the biopsy pathology showed dense fibrosis consistent with scar, scant benign breast tissue, skeletal muscle and no evidence of malignancy.   SUMMARY OF DIAGNOSIS:  Stage I (T1 N0 M0) infiltrating ductal carcinoma of the right breast. She was originally diagnosed with a right ultrasound-guided biopsy in June of 2008. This revealed an infiltrating ductal carcinoma, B[place] grade 2 of 3, ER-positive, [place]-positive, HER2/alfredito 3+. She went on to receive a right lumpectomy with a sentinel lymph node dissection in July of 2008. [place] lymph nodes were negative for malignancy. She began adjuvant TCH (Taxotere, carboplatin, and Herceptin) and received 6 cycles of chemotherapy, completed in December of 2008. She then went on to complete a whole full year or Herceptin. She is status post breast radiation completed in February of 2009. She has been on Arimidex since March of 2009.     FOLLOWUP NOTE : 8/29/17     INTERVAL HISTORY:  Shannon returns to the clinic for routine followup. She had mammogram in feb 2016. She denies having noted any mass/ lumps.  She has no pain, no fatigue, no depression, no anxiety.She follows up with Dr Shin for cardiovascular related symptoms. She has been doing well and takes nitroglycerin once a month.Her hearing is impaired but she follows with Dr Lara for the same.   She has been on anastrozole for 8 years.She has tolerated the Arimidex well with no joint discomfort, no vaginal dryness and no  vaginal bleeding.   She has osteoporosis.  She has received Reclast for a total of 6 years, and Prolia 1 year( started 8/2016). We will stop it today.     REVIEW OF SYSTEMS:  She denies fevers or chills, cough, chest pain, shortness breath, nausea, vomiting, constipation, diarrhea, bone pain, back pain or headache.  The remainder of a 10-point review of systems is negative.        PHYSICAL EXAMINATION:   /59 (BP Location: Right arm, Patient Position: Chair, Cuff Size: Adult Regular)  Pulse 80  Temp 98.3  F (36.8  C) (Oral)  Resp 18  Wt 68.7 kg (151 lb 8 oz)  SpO2 97%  BMI 24.83 kg/m2   GENERAL:  Comfortable,  She has no alopecia.   HEENT:  Oropharynx is without lesions.   LYMPH:  She has no cervical, supraclavicular, subclavicular or axillary lymphadenopathy.   BREASTS:  Examination of the right breast reveals some mild skin thickening. There is a well-healed incision at the 12 o'clock position above the nipple-areolar complex.  The nipple is everted.  There are no masses in the right breast.  Right axillary incision is well-healed without erythema or masses.  Left breast is with nipple inversion.  There are no masses in the left breast.   LUNGS:  Clear to percussion and auscultation.   HEART:  Regular rate and rhythm with a 3/6 systolic murmur at the left sternal border.   ABDOMEN:  Soft and nontender, without hepatosplenomegaly.   EXTREMITIES:  No pedal edema    LABORATORY DATA:  CBC and CMP are within normal limits.        IMAGING:  From prior  She did have a mammogram from 02/11/2016 showing heterogeneously dense breasts, BI-RADS 2.   5/2/2017: ECHO  Interpretation Summary  Moderate aortic stenosis is present. The peak aortic velocity is 3.2 m/sec.  Global and regional left ventricular function is normal with an EF of 60-65%.  Global right ventricular function is normal.  Mild pulmonary hypertension is present. Right ventricular systolic pressure is  33 mmHg above the right atrial pressure.  The  inferior vena cava is normal. Estimated mean right atrial pressure is <3  mmHg.  No pericardial effusion is present.       ASSESSMENT AND PLAN:     1.  Shannon Krishna is a 76-year-old woman with a history of a stage I, T1a N0 M0, infiltrating ductal carcinoma of the right breast, which was ER-positive and HER2-positive.  She had skin thickening at the time of diagnosis with some FDG uptake in the right breast, perhaps related to fibrosis and edema rather than the tumor.  She had 2 skin biopsies that showed no evidence of recurrence of her breast cancer.  The skin thickening continues to improve, and the erythema has largely diminished, although there is still a mottled appearance of the right breast.  Mammography was performed on 02/11/2016 showing BI-RADS 2, and the plan is to continue with yearly mammography.   2.  Adjuvant hormonal therapy.  The plan is to complete 10 years of hormonal therapy.  She began an aromatase inhibitor in 2009, and will continue through 2019.  She has tolerated the Arimidex quite well.  She does have osteoporosis Has been on Zometa and now Prolia. We will stop Prolia today.  3.  Osteoporosis.  She has had zoledronic acid for a total of 6 years. Prolia for 1 year. We will stop Prolia today.  4.  Exercise.  Recommend exercise 150 minutes a week.   5.  Calcium and vitamin D.  She has not been taking calcium and vitamin D. Recommend resuming it.  6. Continued cardiology follow up.  She does have a murmur, with history of aortic stenosis. Follow up with cardiology.  7.  Followup.  In 6 months with [facility]HUM and mammogram. Follow up with Dr Caal in 1 year.DEXA scan in 8/2018    Patient seen and discussed with Dr Afua Randolph  Hem/Onc fellow          Patient Summary:  The patient is a 76-year-old woman being seen in follow up for a stage I, T1 N0 M0 (note error in prior notes stating N1) infiltrating ductal carcinoma of the right breast which was ER positive and HER2 positive. She was  treated with right lumpectomy, sentinel lymph node dissection which was negative for malignancy. This was performed July 2008. She was treated with adjuvant TCH Taxotere, carboplatin and Herceptin for six cycles ending December 2008. She completed a full year of Herceptin and is status post breast radiation therapy completed February 2009. She has been on Arimidex since March of 2009. She had fibrosis of the right breast surrounding skin erythema and lymphedema. Biopsy of the skin of the right breast performed 14 months ago showed no evidence of recurrence. Repeat biopsy performed 05/06/2011 showed skin with dermal chronic inflammation fibrosis, probable radiation changes and focal subcutaneous fat necrosis with no evidence of malignancy. The patient underwent head CT scan on Arelis 3 which showed thickening of the skin along the right breast, focal area of mild FTG activity within the right breast tissue which could be treatment related and an 8 x 5.8 lobulated mildly hypermetabolic lesion in continuity with vascular structures. Also of note was an area of FTG mild uptake along the chest wall under the right breast. A CT guided needle biopsy of this area was performed on 06/10/2011 and the biopsy pathology showed dense fibrosis consistent with scar, scant benign breast tissue, skeletal muscle and no evidence of malignancy. There was dermal chronic inflammation fibrosis, probable radiation changes and focal subcutaneous fat necrosis with no evidence of malignancy. The patient underwent head CT scan on Arelis 3 which showed thickening of the skin along the right breast, focal area of mild FTG activity within the right breast tissue which could be treatment related and an 8 x 5.8 lobulated mildly hypermetabolic lesion in continuity with vascular structures. Also of note was an area of FTG mild uptake along the chest wall under the right breast. A CT guided needle biopsy of this area was performed on 06/10/2011 and the  biopsy pathology showed dense fibrosis consistent with scar, scant benign breast tissue, skeletal muscle and no evidence of malignancy.   SUMMARY OF DIAGNOSIS:  Stage I (T1 N0 M0) infiltrating ductal carcinoma of the right breast. She was originally diagnosed with a right ultrasound-guided biopsy in June of 2008. This revealed an infiltrating ductal carcinoma, [place] grade 2 of 3, ER-positive, [place]-positive, HER2/alfredito 3+. She went on to receive a right lumpectomy with a sentinel lymph node dissection in July of 2008. [place] lymph nodes were negative for malignancy. She began adjuvant TCH (Taxotere, carboplatin, and Herceptin) and received 6 cycles of chemotherapy, completed in December of 2008. She then went on to complete a whole full year or Herceptin. She is status post breast radiation completed in February of 2009. She has been on Arimidex since March of 2009.     FOLLOWUP NOTE : 8/29/17     INTERVAL HISTORY:  Shannon returns to the clinic for routine followup. She had mammogram in feb 2016. She denies having noted any mass/ lumps.  She has no pain, no fatigue, no depression, no anxiety.She follows up with Dr Shin for cardiovascular related symptoms. She has been doing well and takes nitroglycerin once a month.Her hearing is impaired but she follows with Dr Lara for the same.   She has been on anastrozole for 8 years.She has tolerated the Arimidex well with no joint discomfort, no vaginal dryness and no vaginal bleeding.   She has osteoporosis.  She has received Reclast for a total of 6 years, and Prolia 1 year( started 8/2016). We will stop it today.     REVIEW OF SYSTEMS:  She denies fevers or chills, cough, chest pain, shortness breath, nausea, vomiting, constipation, diarrhea, bone pain, back pain or headache.  The remainder of a 10-point review of systems is negative.        PHYSICAL EXAMINATION:   /59 (BP Location: Right arm, Patient Position: Chair, Cuff Size: Adult Regular)  Pulse  80  Temp 98.3  F (36.8  C) (Oral)  Resp 18  Wt 68.7 kg (151 lb 8 oz)  SpO2 97%  BMI 24.83 kg/m2   GENERAL:  Comfortable,  She has no alopecia.   HEENT:  Oropharynx is without lesions.   LYMPH:  She has no cervical, supraclavicular, subclavicular or axillary lymphadenopathy.   BREASTS:  Examination of the right breast reveals some mild skin thickening. There is a well-healed incision at the 12 o'clock position at the edge of the nipple-areolar complex.  The nipple is everted.  There are no masses in the right breast.  Right axillary incision is well-healed without erythema or masses.  Left breast is with nipple inversion.  There are no masses in the left breast.   LUNGS:  Clear to percussion and auscultation.   HEART:  Regular rate and rhythm with a 3/6 systolic murmur at the left sternal border.   ABDOMEN:  Soft and nontender, without hepatosplenomegaly.   EXTREMITIES:  No pedal edema    LABORATORY DATA:  CBC and CMP are within normal limits.        IMAGING:  From prior  She did have a mammogram from 02/11/2016 showing heterogeneously dense breasts, BI-RADS 2.   5/2/2017: ECHO  Interpretation Summary  Moderate aortic stenosis is present. The peak aortic velocity is 3.2 m/sec.  Global and regional left ventricular function is normal with an EF of 60-65%.  Global right ventricular function is normal.  Mild pulmonary hypertension is present. Right ventricular systolic pressure is  33 mmHg above the right atrial pressure.  The inferior vena cava is normal. Estimated mean right atrial pressure is <3  mmHg.  No pericardial effusion is present.       ASSESSMENT AND PLAN:     1.  Shannon Krishna is a 76-year-old woman with a history of a stage I, T1a N0 M0, infiltrating ductal carcinoma of the right breast, which was ER-positive and HER2-positive.  She had skin thickening at the time of diagnosis with some FDG uptake in the right breast, perhaps related to fibrosis and edema rather than the tumor.  She had 2 skin biopsies  that showed no evidence of recurrence of her breast cancer.  The skin thickening continues to improve, and the erythema has largely diminished, although there is still a mottled appearance of the right breast.  Mammography was performed on 02/11/2016 showing BI-RADS 2, and the plan is to continue with yearly mammography.   2.  Adjuvant hormonal therapy.  The plan is to complete 10 years of hormonal therapy.  She began an aromatase inhibitor in 2009, and will continue through 2019.  She has tolerated the Arimidex quite well.  She does have osteoporosis Has been on Zometa and now Prolia. We will stop Prolia today.  3.  Osteoporosis.  She has had zoledronic acid for a total of 6 years. Prolia for 1 year. We will stop Prolia today.  4.  Exercise.  Recommend exercise 150 minutes a week.   5.  Calcium and vitamin D.  She has not been taking calcium and vitamin D. Recommend resuming it.  6. Continued cardiology follow up.  She does have a murmur, with history of aortic stenosis. Follow up with cardiology.   7.  Followup.  In 6 months with NAHUM and mammogram. Follow up with Dr Caal in 1 year.DEXA scan in 8/2018    Thank you for allowing us to participate in this patient's care.  The patient was seen and evaluated by me.  I discussed the patient with the fellow and agree with the findings and plan in the note, which was edited by me.    Sincerely,     Elias Caal MD    [facility]  [phone].        Patient seen and discussed with Dr Afua Randolph  Hem/Onc fellow      I spent 30 minutes with the patient more than 50% of which was in counseling and coordination of

## 2017-08-31 NOTE — PROGRESS NOTES
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 She was treated with right lumpectomy, sentinel lymph node dissection which was negative for malignancy. This was performed July 2008. She was treated with adjuvant TCH Taxotere, carboplatin and Herceptin for six cycles ending December 2008. She completed a full year of Herceptin and is status post breast radiation therapy completed February 2009. She has been on Arimidex since March of 2009. She had fibrosis of the right breast surrounding skin erythema and lymphedema. Biopsy of the skin of the right breast performed 14 months ago showed no evidence of recurrence. Repeat biopsy performed 05/06/2011 showed skin with dermal chronic inflammation fibrosis, probable radiation changes and focal subcutaneous fat necrosis with no evidence of malignancy. The patient underwent head CT scan on Arelis 3 which showed thickening of the skin along the right breast, focal area of mild FTG activity within the right breast tissue which could be treatment related and an 8 x 5.8 lobulated mildly hypermetabolic lesion in continuity with vascular structures. Also of note was an area of FTG mild uptake along the chest wall under the right breast. A CT guided needle biopsy of this area was performed on 06/10/2011 and the biopsy pathology showed dense fibrosis consistent with scar, scant benign breast tissue, skeletal muscle and no evidence of malignancy. There was dermal chronic inflammation fibrosis, probable radiation changes and focal subcutaneous fat necrosis with no evidence of malignancy. The patient underwent head CT scan on Arelis 3 which showed thickening of the skin along the right breast, focal area of mild FTG activity within the right breast tissue which could be treatment related and an 8 x 5.8  lobulated mildly hypermetabolic lesion in continuity with vascular structures. Also of note was an area of FTG mild uptake along the chest wall under the right breast. A CT guided needle biopsy of this area was performed on 06/10/2011 and the biopsy pathology showed dense fibrosis consistent with scar, scant benign breast tissue, skeletal muscle and no evidence of malignancy.   SUMMARY OF DIAGNOSIS:  Stage I (T1 N0 M0) infiltrating ductal carcinoma of the right breast. She was originally diagnosed with a right ultrasound-guided biopsy in June of 2008. This revealed an infiltrating ductal carcinoma, Brij[place] grade 2 of 3, ER-positive, [place]-positive, HER2/alfredito 3+. She went on to receive a right lumpectomy with a sentinel lymph node dissection in July of 2008. [place] lymph nodes were negative for malignancy. She began adjuvant TCH (Taxotere, carboplatin, and Herceptin) and received 6 cycles of chemotherapy, completed in December of 2008. She then went on to complete a whole full year or Herceptin. She is status post breast radiation completed in February of 2009. She has been on Arimidex since March of 2009.     FOLLOWUP NOTE : 8/29/17     INTERVAL HISTORY:  Shannon returns to the clinic for routine followup. She had mammogram in feb 2016. She denies having noted any mass/ lumps.  She has no pain, no fatigue, no depression, no anxiety.She follows up with Dr Shin for cardiovascular related symptoms. She has been doing well and takes nitroglycerin once a month.Her hearing is impaired but she follows with Dr Lara for the same.   She has been on anastrozole for 8 years.She has tolerated the Arimidex well with no joint discomfort, no vaginal dryness and no vaginal bleeding.   She has osteoporosis.  She has received Reclast for a total of 6 years, and Prolia 1 year( started 8/2016). We will stop it today.     REVIEW OF SYSTEMS:  She denies fevers or chills, cough, chest pain, shortness breath, nausea, vomiting,  constipation, diarrhea, bone pain, back pain or headache.  The remainder of a 10-point review of systems is negative.        PHYSICAL EXAMINATION:   /59 (BP Location: Right arm, Patient Position: Chair, Cuff Size: Adult Regular)  Pulse 80  Temp 98.3  F (36.8  C) (Oral)  Resp 18  Wt 68.7 kg (151 lb 8 oz)  SpO2 97%  BMI 24.83 kg/m2   GENERAL:  Comfortable,  She has no alopecia.   HEENT:  Oropharynx is without lesions.   LYMPH:  She has no cervical, supraclavicular, subclavicular or axillary lymphadenopathy.   BREASTS:  Examination of the right breast reveals some mild skin thickening. There is a well-healed incision at the 12 o'clock position above the nipple-areolar complex.  The nipple is everted.  There are no masses in the right breast.  Right axillary incision is well-healed without erythema or masses.  Left breast is with nipple inversion.  There are no masses in the left breast.   LUNGS:  Clear to percussion and auscultation.   HEART:  Regular rate and rhythm with a 3/6 systolic murmur at the left sternal border.   ABDOMEN:  Soft and nontender, without hepatosplenomegaly.   EXTREMITIES:  No pedal edema    LABORATORY DATA:  CBC and CMP are within normal limits.        IMAGING:  From prior  She did have a mammogram from 02/11/2016 showing heterogeneously dense breasts, BI-RADS 2.   5/2/2017: ECHO  Interpretation Summary  Moderate aortic stenosis is present. The peak aortic velocity is 3.2 m/sec.  Global and regional left ventricular function is normal with an EF of 60-65%.  Global right ventricular function is normal.  Mild pulmonary hypertension is present. Right ventricular systolic pressure is  33 mmHg above the right atrial pressure.  The inferior vena cava is normal. Estimated mean right atrial pressure is <3  mmHg.  No pericardial effusion is present.       ASSESSMENT AND PLAN:     1.  Shannon Krishna is a 76-year-old woman with a history of a stage I, T1a N0 M0, infiltrating ductal carcinoma of  the right breast, which was ER-positive and HER2-positive.  She had skin thickening at the time of diagnosis with some FDG uptake in the right breast, perhaps related to fibrosis and edema rather than the tumor.  She had 2 skin biopsies that showed no evidence of recurrence of her breast cancer.  The skin thickening continues to improve, and the erythema has largely diminished, although there is still a mottled appearance of the right breast.  Mammography was performed on 02/11/2016 showing BI-RADS 2, and the plan is to continue with yearly mammography.   2.  Adjuvant hormonal therapy.  The plan is to complete 10 years of hormonal therapy.  She began an aromatase inhibitor in 2009, and will continue through 2019.  She has tolerated the Arimidex quite well.  She does have osteoporosis Has been on Zometa and now Prolia. We will stop Prolia today.  3.  Osteoporosis.  She has had zoledronic acid for a total of 6 years. Prolia for 1 year. We will stop Prolia today.  4.  Exercise.  Recommend exercise 150 minutes a week.   5.  Calcium and vitamin D.  She has not been taking calcium and vitamin D. Recommend resuming it.  6. Continued cardiology follow up.  She does have a murmur, with history of aortic stenosis. Follow up with cardiology.  7.  Followup.  In 6 months with [facility]HUM and mammogram. Follow up with Dr Caal in 1 year.DEXA scan in 8/2018    Patient seen and discussed with Dr Afua Randolph  Hem/Onc fellow

## 2017-09-02 NOTE — PROGRESS NOTES
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 She was treated with right lumpectomy, sentinel lymph node dissection which was negative for malignancy. This was performed July 2008. She was treated with adjuvant TCH Taxotere, carboplatin and Herceptin for six cycles ending December 2008. She completed a full year of Herceptin and is status post breast radiation therapy completed February 2009. She has been on Arimidex since March of 2009. She had fibrosis of the right breast surrounding skin erythema and lymphedema. Biopsy of the skin of the right breast performed 14 months ago showed no evidence of recurrence. Repeat biopsy performed 05/06/2011 showed skin with dermal chronic inflammation fibrosis, probable radiation changes and focal subcutaneous fat necrosis with no evidence of malignancy. The patient underwent head CT scan on Arelis 3 which showed thickening of the skin along the right breast, focal area of mild FTG activity within the right breast tissue which could be treatment related and an 8 x 5.8 lobulated mildly hypermetabolic lesion in continuity with vascular structures. Also of note was an area of FTG mild uptake along the chest wall under the right breast. A CT guided needle biopsy of this area was performed on 06/10/2011 and the biopsy pathology showed dense fibrosis consistent with scar, scant benign breast tissue, skeletal muscle and no evidence of malignancy. There was dermal chronic inflammation fibrosis, probable radiation changes and focal subcutaneous fat necrosis with no evidence of malignancy. The patient underwent head CT scan on Arelis 3 which showed thickening of the skin along the right breast, focal area of mild FTG activity within the right breast tissue which could be treatment related and an 8 x 5.8  lobulated mildly hypermetabolic lesion in continuity with vascular structures. Also of note was an area of FTG mild uptake along the chest wall under the right breast. A CT guided needle biopsy of this area was performed on 06/10/2011 and the biopsy pathology showed dense fibrosis consistent with scar, scant benign breast tissue, skeletal muscle and no evidence of malignancy.   SUMMARY OF DIAGNOSIS:  Stage I (T1 N0 M0) infiltrating ductal carcinoma of the right breast. She was originally diagnosed with a right ultrasound-guided biopsy in June of 2008. This revealed an infiltrating ductal carcinoma, Brijesh grade 2 of 3, ER-positive, HI-positive, HER2/alfredito 3+. She went on to receive a right lumpectomy with a sentinel lymph node dissection in July of 2008. [place] lymph nodes were negative for malignancy. She began adjuvant TCH (Taxotere, carboplatin, and Herceptin) and received 6 cycles of chemotherapy, completed in December of 2008. She then went on to complete a whole full year or Herceptin. She is status post breast radiation completed in February of 2009. She has been on Arimidex since March of 2009.     FOLLOWUP NOTE : 8/29/17     INTERVAL HISTORY:  Shannon returns to the clinic for routine followup. She had mammogram in feb 2016. She denies having noted any mass/ lumps.  She has no pain, no fatigue, no depression, no anxiety.She follows up with Dr Shin for cardiovascular related symptoms. She has been doing well and takes nitroglycerin once a month.Her hearing is impaired but she follows with Dr Lara for the same.   She has been on anastrozole for 8 years.She has tolerated the Arimidex well with no joint discomfort, no vaginal dryness and no vaginal bleeding.   She has osteoporosis.  She has received Reclast for a total of 6 years, and Prolia 1 year( started 8/2016). We will stop it today.     REVIEW OF SYSTEMS:  She denies fevers or chills, cough, chest pain, shortness breath, nausea, vomiting,  constipation, diarrhea, bone pain, back pain or headache.  The remainder of a 10-point review of systems is negative.        PHYSICAL EXAMINATION:   /59 (BP Location: Right arm, Patient Position: Chair, Cuff Size: Adult Regular)  Pulse 80  Temp 98.3  F (36.8  C) (Oral)  Resp 18  Wt 68.7 kg (151 lb 8 oz)  SpO2 97%  BMI 24.83 kg/m2   GENERAL:  Comfortable,  She has no alopecia.   HEENT:  Oropharynx is without lesions.   LYMPH:  She has no cervical, supraclavicular, subclavicular or axillary lymphadenopathy.   BREASTS:  Examination of the right breast reveals some mild skin thickening. There is a well-healed incision at the 12 o'clock position at the edge of the nipple-areolar complex.  The nipple is everted.  There are no masses in the right breast.  Right axillary incision is well-healed without erythema or masses.  Left breast is with nipple inversion.  There are no masses in the left breast.   LUNGS:  Clear to percussion and auscultation.   HEART:  Regular rate and rhythm with a 3/6 systolic murmur at the left sternal border.   ABDOMEN:  Soft and nontender, without hepatosplenomegaly.   EXTREMITIES:  No pedal edema    LABORATORY DATA:  CBC and CMP are within normal limits.        IMAGING:  From prior  She did have a mammogram from 02/11/2016 showing heterogeneously dense breasts, BI-RADS 2.   5/2/2017: ECHO  Interpretation Summary  Moderate aortic stenosis is present. The peak aortic velocity is 3.2 m/sec.  Global and regional left ventricular function is normal with an EF of 60-65%.  Global right ventricular function is normal.  Mild pulmonary hypertension is present. Right ventricular systolic pressure is  33 mmHg above the right atrial pressure.  The inferior vena cava is normal. Estimated mean right atrial pressure is <3  mmHg.  No pericardial effusion is present.       ASSESSMENT AND PLAN:     1.  Shannon Krishna is a 76-year-old woman with a history of a stage I, T1a N0 M0, infiltrating ductal  carcinoma of the right breast, which was ER-positive and HER2-positive.  She had skin thickening at the time of diagnosis with some FDG uptake in the right breast, perhaps related to fibrosis and edema rather than the tumor.  She had 2 skin biopsies that showed no evidence of recurrence of her breast cancer.  The skin thickening continues to improve, and the erythema has largely diminished, although there is still a mottled appearance of the right breast.  Mammography was performed on 02/11/2016 showing BI-RADS 2, and the plan is to continue with yearly mammography.   2.  Adjuvant hormonal therapy.  The plan is to complete 10 years of hormonal therapy.  She began an aromatase inhibitor in 2009, and will continue through 2019.  She has tolerated the Arimidex quite well.  She does have osteoporosis Has been on Zometa and now Prolia. We will stop Prolia today.  3.  Osteoporosis.  She has had zoledronic acid for a total of 6 years. Prolia for 1 year. We will stop Prolia today.  4.  Exercise.  Recommend exercise 150 minutes a week.   5.  Calcium and vitamin D.  She has not been taking calcium and vitamin D. Recommend resuming it.  6. Continued cardiology follow up.  She does have a murmur, with history of aortic stenosis. Follow up with cardiology.   7.  Followup.  In 6 months with NAHUM and mammogram. Follow up with Dr Caal in 1 year.DEXA scan in 8/2018    Thank you for allowing us to participate in this patient's care.  The patient was seen and evaluated by me.  I discussed the patient with the fellow and agree with the findings and plan in the note, which was edited by me.    Sincerely,     Elias Caal MD    [facility]  [phone].        Patient seen and discussed with Dr Afua Randolph  Hem/Onc fellow      I spent 30 minutes with the patient more than 50% of which was in counseling and coordination of care.

## 2017-11-15 ENCOUNTER — OFFICE VISIT (OUTPATIENT)
Dept: OTOLARYNGOLOGY | Facility: CLINIC | Age: 77
End: 2017-11-15

## 2017-11-15 DIAGNOSIS — H61.303 COLLAPSE OF BOTH EXTERNAL EAR CANALS: ICD-10-CM

## 2017-11-15 DIAGNOSIS — H61.23 BILATERAL IMPACTED CERUMEN: Primary | ICD-10-CM

## 2017-11-15 NOTE — NURSING NOTE
Chief Complaint   Patient presents with     RECHECK     Return 6 month ear cleaning and F/U Pt states no pain today.        N Dante KENDRICKN

## 2017-11-15 NOTE — PROGRESS NOTES
History of Present Illness:  This is a 76-year-old patient who has a long history of meatal collapse. She has not had symptoms of concern recently.    Past Medical History:  Her medical history has not changed.    Physical Exam: Exam today under the operating microscope shows that both ears were collapsed and have cerumen impactions. Using a cerumen sponge and alligator forceps they were cleaned and the tympanic membranes appear anatomically normal.    Impression: Bilateral cerumen.    Plan: Continued care every six months.      SL/ms

## 2017-11-15 NOTE — PATIENT INSTRUCTIONS
You will need  to schedule a follow up appointment in 6 months for ear cleaning.   Please call our clinic for any questions,concerns,or worsening symptoms.      Clinic #[phone]       Option 3  for the triage nurse.

## 2017-11-15 NOTE — LETTER
11/15/2017       RE: Shannon Krishna  [address]   [facility] [address]     Dear Colleague,    Thank you for referring your patient, Shannon Krishna, to the [facility] EAR NOSE AND TH[facility]AT at [facility]. Please see a copy of my visit note below.    History of Present Illness:  This is a 76-year-old patient who has a long history of meatal collapse. She has not had symptoms of concern recently.    Past Medical History:  Her medical history has not changed.    Physical Exam: Exam today under the operating microscope shows that both ears were collapsed and have cerumen impactions. Using a cerumen sponge and alligator forceps they were cleaned and the tympanic membranes appear anatomically normal.    Impression: Bilateral cerumen.    Plan: Continued care every six months.      SL/ms    Again, thank you for allowing me to participate in the care of your patient.      Sincerely,    Davon Lara MD

## 2017-11-15 NOTE — MR AVS SNAPSHOT
"              After Visit Summary   11/15/2017    Shannon Krishna    MRN: [id number]           Patient Information     Date Of Birth          1940        Visit Information        Provider Department      11/15/2017 11:30 AM Davon Lara MD [facility] Ear Nose and Throat        Care Instructions    You will need  to schedule a follow up appointment in 6 months for ear cleaning.   Please call our clinic for any questions,concerns,or worsening symptoms.      Clinic #[phone]       Option 3  for the triage nurse.          Follow-ups after your visit        Your next 10 appointments already scheduled     Feb 27, 2018  1:00 PM CST   (Arrive by 12:45 PM)   MA DIAGNOSTIC BILATERAL W/ VIELKA with [id number]   [facility] ([facility] [place])    [address] [place] [id number] [phone]           Do not use any powder, lotion or deodorant under your arms or on your breast. If you do, we will ask you to remove it before your exam.  Wear comfortable, two-piece clothing.  If you have any allergies, tell your care team.  Bring any previous mammograms from other facilities or have them mailed to the breast center.  Three-dimensional (3D) mammograms are available at [place] locations in [facility], [facility], [facility], and [place]. [facility] locations include [place] and [facility] Center in [place]. Benefits of 3D mammograms include: - Improved rate of cancer detection - Decreases your chance of having to go back for more tests, which means fewer: - \"False-positive\" results (This means that there is an abnormal area but it isn't cancer.) - Invasive testing procedures, such as a biopsy or surgery - Can provide clearer images of the breast if you have dense breast tissue. 3D mammography is an optional exam that anyone can have with a 2D mammogram. It doesn't replace or take the place of a 2D " mammogram. 2D mammograms remain an effective screening test for all women.  Not all insurance companies cover the cost of a 3D mammogram. Check with your insurance.            Feb 27, 2018  1:40 PM CST   (Arrive by 1:25 PM)   Return Visit with Darcy Bruce PA-C   [facility] ([facility])    [address] [place] [address]  [address]nd [facility] [id number] [phone]            May 16, 2018 11:30 AM CDT   (Arrive by 11:15 AM)   Return Visit with Davon Lara MD   [facility] Ear Nose and Throat ([facility])    [address] [place] [address]  4th Floor  [facility] [id number] [phone]            Aug 28, 2018  2:00 PM CDT   (Arrive by 1:45 PM)   Return Visit with Elias Caal MD   [facility] ([facility])    [address] [place] [address]  [address] [facility] [id number] [phone]              Who to contact     Please call your clinic at [phone] to:    Ask questions about your health    Make or cancel appointments    Discuss your medicines    Learn about your test results    Speak to your doctor   If you have compliments or concerns about an experience at your clinic, or if you wish to file a complaint, please contact [facility] Patient Relations at [phone] or email us at [email or web address]         Additional Information About Your Visit        [employer] Information     [employer] gives you secure access to your electronic health record. If you see a primary care provider, you can also send messages to your care team and make appointments. If you have questions, please call your primary care clinic.  If you do not have a primary care provider, please call [phone] and they will assist you.      [employer] is an electronic gateway that provides easy, online access to your medical records. With [employer], you can request a clinic appointment,  read your test results, renew a prescription or communicate with your care team.     To access your existing account, please contact your [facility] or call [phone] for assistance.        Care EveryWhere ID     This is your Care EveryWhere ID. This could be used by other organizations to access your Normal medical records  [id number]         Blood Pressure from Last 3 Encounters:   08/29/17 109/59   05/02/17 105/68   04/18/17 127/64    Weight from Last 3 Encounters:   08/29/17 68.7 kg (151 lb 8 oz)   05/02/17 69 kg (152 lb 1.6 oz)   11/23/16 68.6 kg (151 lb 3.2 oz)              Today, you had the following     No orders found for display       Primary Care Provider Office Phone # Fax #    Valeria Purvisanders [phone]       [address] [facility] [address]        Equal Access to Services     PEGGY REGALADO : Hadii aad ku hadasho Soomaali, waaxda luqadaha, qaybta kaalmada adeegyada, waxay idiin haymary collins . So [facility] [phone].    ATENCIÓN: Si habla español, tiene a ang disposición servicios gratuitos de asistencia lingüística. Llame al [phone].    We comply with applicable federal civil rights laws and [place] laws. We do not discriminate on the basis of race, color, national origin, age, disability, sex, sexual orientation, or gender identity.            Thank you!     Thank you for choosing [facility] EAR NOSE AND THROAT  for your care. Our goal is always to provide you with excellent care. Hearing back from our patients is one way we can continue to improve our services. Please take a few minutes to complete the written survey that you may receive in the mail after your visit with us. Thank you!             Your Updated Medication List - Protect others around you: Learn how to safely use, store and throw away your medicines at www.disposemymeds.org.          This list is accurate as of:  11/15/17 11:44 AM.  Always use your most recent med list.                   Brand Name Dispense Instructions for use Diagnosis    amitriptyline 25 MG tablet    ELAVIL     Take 1-2 tablets by mouth At Bedtime.        anastrozole 1 MG tablet    ARIMIDEX    90 tablet    Take 1 tablet (1 mg) by mouth daily    Breast cancer of upper-outer quadrant of right female breast (H)       aspirin 81 MG EC tablet      Take 1 tablet by mouth daily.    CAD (coronary artery disease)       diclofenac 1 % Gel topical gel    VOLTAREN    100 g    Apply 4 grams to knees or 2 grams to hands four times daily using enclosed dosing card.    Primary osteoarthritis of right knee       isosorbide mononitrate 60 MG 24 hr tablet    IMDUR    91 tablet    Take 1 tablet (60 mg) by mouth daily    CAD (coronary artery disease)       LIPITOR 40 MG tablet   Generic drug:  atorvastatin      Take 40 mg by mouth daily.        lisinopril 10 MG tablet    PRINIVIL/ZESTRIL     Take 1 tablet by mouth daily 5 mg total        metoprolol 25 MG 24 hr tablet    TOPROL-XL    90 tablet    Take 1 tablet (25 mg) by mouth daily    CAD (coronary artery disease)       MULTIVITAMINS PO      Take 1 tablet by mouth daily.        nitroGLYcerin 0.4 MG sublingual tablet    NITROSTAT    25 tablet    Place 1 tablet (0.4 mg) under the tongue every 5 minutes as needed for chest pain    CAD (coronary artery disease)       OMEGA 3 PO      Take  by mouth. 1 every two days        oxazepam 10 MG capsule    SERAX     Take 1 capsule by mouth daily.        PROLIA 60 MG/ML Soln injection   Generic drug:  denosumab      Inject 60 mg Subcutaneous once        ranitidine 150 MG tablet    ZANTAC     Take 1 tablet by mouth 2 times daily. In the morning and in the evening.

## 2017-11-15 NOTE — LETTER
11/15/2017      RE: Shannon Krishna  [address]   [facility] [address]       History of Present Illness:  This is a 76-year-old patient who has a long history of meatal collapse. She has not had symptoms of concern recently.    Past Medical History:  Her medical history has not changed.    Physical Exam: Exam today under the operating microscope shows that both ears were collapsed and have cerumen impactions. Using a cerumen sponge and alligator forceps they were cleaned and the tympanic membranes appear anatomically normal.    Impression: Bilateral cerumen.    Plan: Continued care every six months.      SL/ms Davon Lara MD

## 2017-11-16 ENCOUNTER — TELEPHONE (OUTPATIENT)
Dept: OBGYN | Facility: CLINIC | Age: 77
End: 2017-11-16

## 2017-11-16 NOTE — TELEPHONE ENCOUNTER
Spoke with Milady. She is coming to the clinic 11/29/17 for endometrial biopsy for post menopausal bleeding. She states last week she had one episode of heavy vaginal bleeding where she soaked through her pants, since then she has not had any bleeding. She was referred to us by her [facility] Bon[facility]. She had an US after this episode of bleeding on 11/14/17 records are in Care Everywhere.    Verified with MD that this is an appropriate time frame for scheduling and pt will call if anything changes before then.

## 2017-11-17 ASSESSMENT — ANXIETY QUESTIONNAIRES
7. FEELING AFRAID AS IF SOMETHING AWFUL MIGHT HAPPEN: NOT AT ALL
7. FEELING AFRAID AS IF SOMETHING AWFUL MIGHT HAPPEN: NOT AT ALL
1. FEELING NERVOUS, ANXIOUS, OR ON EDGE: NOT AT ALL
5. BEING SO RESTLESS THAT IT IS HARD TO SIT STILL: NOT AT ALL
3. WORRYING TOO MUCH ABOUT DIFFERENT THINGS: NOT AT ALL
GAD7 TOTAL SCORE: 0
4. TROUBLE RELAXING: NOT AT ALL
6. BECOMING EASILY ANNOYED OR IRRITABLE: NOT AT ALL
GAD7 TOTAL SCORE: 0
2. NOT BEING ABLE TO STOP OR CONTROL WORRYING: NOT AT ALL
GAD7 TOTAL SCORE: 0

## 2017-11-17 ASSESSMENT — ENCOUNTER SYMPTOMS
DECREASED LIBIDO: 0
HOT FLASHES: 0

## 2017-11-18 ASSESSMENT — ANXIETY QUESTIONNAIRES: GAD7 TOTAL SCORE: 0

## 2017-11-20 PROBLEM — H61.23 BILATERAL IMPACTED CERUMEN: Status: ACTIVE | Noted: 2017-11-20

## 2017-11-20 PROBLEM — H61.303: Status: ACTIVE | Noted: 2017-11-20

## 2017-11-29 ENCOUNTER — OFFICE VISIT (OUTPATIENT)
Dept: OBGYN | Facility: CLINIC | Age: 77
End: 2017-11-29
Payer: MEDICARE

## 2017-11-29 VITALS
WEIGHT: 154.5 LBS | SYSTOLIC BLOOD PRESSURE: 130 MMHG | DIASTOLIC BLOOD PRESSURE: 78 MMHG | HEART RATE: 85 BPM | HEIGHT: 65 IN | BODY MASS INDEX: 25.74 KG/M2

## 2017-11-29 DIAGNOSIS — N95.0 POSTMENOPAUSAL BLEEDING: Primary | ICD-10-CM

## 2017-11-29 PROCEDURE — 99212 OFFICE O/P EST SF 10 MIN: CPT | Mod: 25,ZF

## 2017-11-29 ASSESSMENT — PAIN SCALES - GENERAL: PAINLEVEL: NO PAIN (0)

## 2017-11-29 NOTE — MR AVS SNAPSHOT
"              After Visit Summary   11/29/2017    Shannon Krishna    MRN: [id number]           Patient Information     Date Of Birth          1940        Visit Information        Provider Department      11/29/2017 2:15 PM Idalia Barba MD [facility]        Care Instructions    Return to clinic if you have any further episodes of vaginal bleeding.  Follow-up with primary care provider.          Follow-ups after your visit        Your next 10 appointments already scheduled     Nov 29, 2017  2:15 PM CST   New Patient Visit with Idalia Barba MD   [facility] ([facility])    [facility] [address]  [address]  [address]  [facility] [id number] [phone]            Feb 27, 2018  1:00 PM CST   (Arrive by 12:45 PM)   MA DIAGNOSTIC BILATERAL W/ VIELKA with UCBCMA2   [facility] ([facility])    [address]  2nd Floor  [facility] [id number] [phone]           Do not use any powder, lotion or deodorant under your arms or on your breast. If you do, we will ask you to remove it before your exam.  Wear comfortable, two-piece clothing.  If you have any allergies, tell your care team.  Bring any previous mammograms from other facilities or have them mailed to the breast center.  Three-dimensional (3D) mammograms are available at [place] locations in [facility], [place], [place], [facility], [place], [place], and [place]. [facility] locations include [place] [facility] in [place]. Benefits of 3D mammograms include: - Improved rate of cancer detection - Decreases your chance of having to go back for more tests, which means fewer: - \"False-positive\" results (This means that there is an abnormal area but it isn't cancer.) - Invasive testing procedures, such as a biopsy or surgery - Can provide clearer images of the breast if you have dense breast " tissue. 3D mammography is an optional exam that anyone can have with a 2D mammogram. It doesn't replace or take the place of a 2D mammogram. 2D mammograms remain an effective screening test for all women.  Not all insurance companies cover the cost of a 3D mammogram. Check with your insurance.            Feb 27, 2018  1:40 PM CST   (Arrive by 1:25 PM)   Return Visit with Darcy Bruce PA-C   [facility] ([facility])    [address] [place] [id number] [phone]            May 16, 2018 11:30 AM CDT   (Arrive by 11:15 AM)   Return Visit with Davon Lara MD   [facility])    [address] [place] [id number] [phone]            Aug 28, 2018  2:00 PM CDT   (Arrive by 1:45 PM)   Return Visit with Elias Caal MD   [facility])    [address] [place] [id number] [phone]              Who to contact     Please call your clinic at [phone] to:    Ask questions about your health    Make or cancel appointments    Discuss your medicines    Learn about your test results    Speak to your doctor   If you have compliments or concerns about an experience at your clinic, or if you wish to file a complaint, please contact [facility] Patient Relations at [phone] or email us at [email or web address]         Additional Information About Your Visit        [employer] Information     [employer] gives you secure access to your electronic health record. If you see a primary care provider, you can also send messages to your care team and make appointments. If you have questions, please call your primary care clinic.  If you do not have a primary care provider, please call [phone] and they will assist you.      [employer] is an  "electronic gateway that provides easy, online access to your medical records. With [employer], you can request a clinic appointment, read your test results, renew a prescription or communicate with your care team.     To access your existing account, please contact your [facility] or call [phone] for assistance.        Care EveryWhere ID     This is your Care EveryWhere ID. This could be used by other organizations to access your [facility] medical records  [id number]        Your Vitals Were     Pulse Height Breastfeeding? BMI (Body Mass Index)          85 1.651 m (5' 5\") No 25.71 kg/m2         Blood Pressure from Last 3 Encounters:   11/29/17 130/78   08/29/17 109/59   05/02/17 105/68    Weight from Last 3 Encounters:   11/29/17 70.1 kg (154 lb 8 oz)   08/29/17 68.7 kg (151 lb 8 oz)   05/02/17 69 kg (152 lb 1.6 oz)              Today, you had the following     No orders found for display       Primary Care Provider Office Phone # Fax #    Valeria NAVARRETE [facility] [phone]       [address] [facility] [address]        Equal Access to Services     PEGGY REGALADO : Hadii aad ku hadasho Sowinnie, waaxda luqadaha, qaybta kaalmada adeegyada, lul juarez. So [facility] [phone].    ATENCIÓN: Si habla español, tiene a ang disposición servicios gratuitos de asistencia lingüística. Llame al [phone].    We comply with applicable federal civil rights laws and [place] laws. We do not discriminate on the basis of race, color, national origin, age, disability, sex, sexual orientation, or gender identity.            Thank you!     Thank you for choosing [facility]  for your care. Our goal is always to provide you with excellent care. Hearing back from our patients is one way we can continue to improve our services. Please take a few minutes to complete the written survey that you may " receive in the mail after your visit with us. Thank you!             Your Updated Medication List - Protect others around you: Learn how to safely use, store and throw away your medicines at www.disposemymeds.org.          This list is accurate as of: 11/29/17  2:01 PM.  Always use your most recent med list.                   Brand Name Dispense Instructions for use Diagnosis    amitriptyline 25 MG tablet    ELAVIL     Take 1-2 tablets by mouth At Bedtime.        anastrozole 1 MG tablet    ARIMIDEX    90 tablet    Take 1 tablet (1 mg) by mouth daily    Breast cancer of upper-outer quadrant of right female breast (H)       aspirin 81 MG EC tablet      Take 1 tablet by mouth daily.    CAD (coronary artery disease)       diclofenac 1 % Gel topical gel    VOLTAREN    100 g    Apply 4 grams to knees or 2 grams to hands four times daily using enclosed dosing card.    Primary osteoarthritis of right knee       isosorbide mononitrate 60 MG 24 hr tablet    IMDUR    91 tablet    Take 1 tablet (60 mg) by mouth daily    CAD (coronary artery disease)       LIPITOR 40 MG tablet   Generic drug:  atorvastatin      Take 40 mg by mouth daily.        lisinopril 10 MG tablet    PRINIVIL/ZESTRIL     Take 1 tablet by mouth daily 5 mg total        metoprolol 25 MG 24 hr tablet    TOPROL-XL    90 tablet    Take 1 tablet (25 mg) by mouth daily    CAD (coronary artery disease)       MULTIVITAMINS PO      Take 1 tablet by mouth daily.        nitroGLYcerin 0.4 MG sublingual tablet    NITROSTAT    25 tablet    Place 1 tablet (0.4 mg) under the tongue every 5 minutes as needed for chest pain    CAD (coronary artery disease)       OMEGA 3 PO      Take  by mouth. 1 every two days        oxazepam 10 MG capsule    SERAX     Take 1 capsule by mouth daily.        PROLIA 60 MG/ML Soln injection   Generic drug:  denosumab      Inject 60 mg Subcutaneous once        ranitidine 150 MG tablet    ZANTAC     Take 1 tablet by mouth 2 times daily. In the  morning and in the evening.

## 2017-11-29 NOTE — PROGRESS NOTES
[facility]  Gynecology Visit    Reason for Consult: Postmenopausal bleeding    HPI:    Shannon Krishna is a 76 year old G0 here for postmenopausal bleeding. On November 9, Shannon went to the bathroom and found that her underwear was full of blood, and it had soaked through to her jeans. About 4-5 days before that, she had a scant amount of blood on her underwear that she thought was likely due to hemorrhoids. This was her first episode of postmenopausal bleeding, and she has had no further bleeding since then. She saw her PCP on 11/14/17 and had an US done that day that showed a 3x3.1x1.1 cm with a 1.6mm stripe, and fluid and cystic areas within the endometrial canal. She has a history of cervical conization and cryotherapy in 1975. She has a history of breast cancer in 2008 and is on anastrozole.     GYN History  - LMP: No LMP recorded. Patient is postmenopausal.  - Menses: Menarche at 17, regular periods, menopause in mid-50s, no HRT.  - Pap Smears: H/o dysplasia treated in mid-1970s. Negative Paps since.      Lab Results   Component Value Date    PAP NIL 01/13/2010     - Sexual Activity/Concerns: Not sexually active    OBHx: G0    PMHx:   Past Medical History:   Diagnosis Date     Adenocarcinoma of breast (H)      Aortic sclerosis     Mild to moderate sclerosis without significant stenosis.     Arthritis 2015     Coronary artery disease     Multivessel CAD,  of the mid RCA     Esophageal reflux      Hearing problem 2010     History of radiation therapy 2008     Hyperlipidemia      Hypertension      Osteoporosis         PSHx:   Past Surgical History:   Procedure Laterality Date     APPENDECTOMY      1950's     BIOPSY  2008    ultrasound guided biopsy of right breast     BREAST SURGERY       CARDIAC SURGERY  2008     COLONOSCOPY  2015     GENITOURINARY SURGERY  1970    cryosurgery on vagina     GENITOURINARY SURGERY  1975    conization     GI SURGERY  1960's    perforated ulcer     GYN SURGERY  1975      "LUMPECTOMY BREAST  2008    breast cancer, done in [facility]     port-a-cath  2008    Removed in 2009     TONSILLECTOMY  1950     VASCULAR SURGERY         Meds: Amitriptyline, anastrozole, baby aspirin, atorvastatin, Prolia injections, Voltaren gel, Imdur, lisinopril, metoprolol, multivitamins, nitroglycerine, omega-3 fatty acids, oxazepam, zantac  Allergies:  NKDA    SocHx: Former cigarette smoker: 30 pack year hx, quite in 1990, no Etoh or drugs    FamHx:  No hx of gynecologic     ROS: 10-Point ROS negative except as noted in HPI    Physical Exam  /78  Pulse 85  Ht 1.651 m (5' 5\")  Wt 70.1 kg (154 lb 8 oz)  Breastfeeding? No  BMI 25.71 kg/m2  Gen: Well-appearing, NAD  CV:  Regular rate  Pulm: Breathing comfortably on room air    Imaging:  Pelvic Ultrasound 11/14/17  Uterus:  3 x 3.1 x 1.1 cm  Myometrium: Normal  Endometrial thickness: 1.6 mm.   Other: Fluid and cystic areas within the endometrial canal.  May be related to  hemorrhage secondary to endometrial atrophy.  Right ovary: 0.8 x 0.7 x 1.6 cm.  Normal size and appearance.  Left ovary: 0.8 x 0.9 x 1.4 cm.  Normal size and appearance.  Fluid: No free fluid.  Assessment/Plan:  Shannon Krishna is a 76 year old G0 here with post-menopausal bleeding.    Reviewed with Shannon that thin endometrial stripe after one episode of post-menopausal bleeding is very reassuring that she does not have endometrial cancer. Offered her an endometrial biopsy, but she was reassured by ultrasound findings. Fluid in the cavity likely due to cervical stenosis from conization and cryotherapy. She does not have any risks factors for endometrial cancer. If she has another episode of post-menopausal bleeding, plan to repeat ultrasound. Encouraged her to follow-up with PCP for history of adenomatous polyps and rectal bleeding.      Return to clinic if symptoms worsen or fail to improve.    Staffed with Dr. Barr.     Idalia Barba MD  Ob/Gyn, PGY-1  11/29/2017, 1:13 " PM    The Patient was seen in Resident Continuity [facility] by AVINASH DIAZ.  I saw the patient with the resident, Assessment and plan were jointly made.    Ludivina Barr MD

## 2017-11-29 NOTE — NURSING NOTE
Chief Complaint   Patient presents with     Establish Care     EMBX for post menopausal beeding   Becky Macias LPN

## 2017-11-29 NOTE — PATIENT INSTRUCTIONS
Return to clinic if you have any further episodes of vaginal bleeding.  Follow-up with primary care provider.

## 2017-11-30 DIAGNOSIS — I25.10 CAD (CORONARY ARTERY DISEASE): ICD-10-CM

## 2017-12-05 RX ORDER — NITROGLYCERIN 0.4 MG/1
0.4 TABLET SUBLINGUAL EVERY 5 MIN PRN
Qty: 25 TABLET | Refills: 1 | Status: SHIPPED | OUTPATIENT
Start: 2017-12-05 | End: 2019-02-15

## 2017-12-27 DIAGNOSIS — C50.411 BREAST CANCER OF UPPER-OUTER QUADRANT OF RIGHT FEMALE BREAST (H): ICD-10-CM

## 2017-12-28 NOTE — TELEPHONE ENCOUNTER
Medication Requested and Quantity:  Arimidex  Last date written and prescriber:  12/2016  Last refill per fax:  10/2/17 for #[phone]  Last office visit: 8/2017    Next office visit:  2/2018 with Darcy Bruce  NOTES: The plan is to complete 10 years of hormonal therapy.  She began an aromatase inhibitor in 2009, and will continue through 2019

## 2018-01-05 RX ORDER — ANASTROZOLE 1 MG/1
1 TABLET ORAL DAILY
Qty: 90 TABLET | Refills: 0 | Status: SHIPPED | OUTPATIENT
Start: 2018-01-05 | End: 2018-04-05

## 2018-01-24 DIAGNOSIS — I25.10 CAD (CORONARY ARTERY DISEASE): ICD-10-CM

## 2018-01-25 RX ORDER — ISOSORBIDE MONONITRATE 60 MG/1
60 TABLET, EXTENDED RELEASE ORAL DAILY
Qty: 91 TABLET | Refills: 3 | Status: SHIPPED | OUTPATIENT
Start: 2018-01-25 | End: 2019-01-02

## 2018-02-16 ENCOUNTER — RADIANT APPOINTMENT (OUTPATIENT)
Dept: MAMMOGRAPHY | Facility: CLINIC | Age: 78
End: 2018-02-16
Payer: MEDICARE

## 2018-02-16 ENCOUNTER — ONCOLOGY VISIT (OUTPATIENT)
Dept: ONCOLOGY | Facility: CLINIC | Age: 78
End: 2018-02-16
Attending: INTERNAL MEDICINE
Payer: MEDICARE

## 2018-02-16 VITALS
RESPIRATION RATE: 16 BRPM | WEIGHT: 160 LBS | OXYGEN SATURATION: 98 % | HEART RATE: 72 BPM | DIASTOLIC BLOOD PRESSURE: 68 MMHG | TEMPERATURE: 98 F | SYSTOLIC BLOOD PRESSURE: 120 MMHG | BODY MASS INDEX: 26.63 KG/M2

## 2018-02-16 DIAGNOSIS — Z17.0 MALIGNANT NEOPLASM OF UPPER-OUTER QUADRANT OF RIGHT BREAST IN FEMALE, ESTROGEN RECEPTOR POSITIVE (H): Primary | ICD-10-CM

## 2018-02-16 DIAGNOSIS — C50.411 BREAST CANCER OF UPPER-OUTER QUADRANT OF RIGHT FEMALE BREAST (H): ICD-10-CM

## 2018-02-16 DIAGNOSIS — C50.411 MALIGNANT NEOPLASM OF UPPER-OUTER QUADRANT OF RIGHT BREAST IN FEMALE, ESTROGEN RECEPTOR POSITIVE (H): Primary | ICD-10-CM

## 2018-02-16 PROCEDURE — 99214 OFFICE O/P EST MOD 30 MIN: CPT | Mod: ZP | Performed by: PHYSICIAN ASSISTANT

## 2018-02-16 PROCEDURE — G0463 HOSPITAL OUTPT CLINIC VISIT: HCPCS | Mod: ZF

## 2018-02-16 ASSESSMENT — PAIN SCALES - GENERAL: PAINLEVEL: NO PAIN (0)

## 2018-02-16 NOTE — LETTER
2/16/2018      RE: Shannon Meyer [facility]  [address]   [facility] [address]       DIAGNOSIS:  Stage I (T1 N0 M0) infiltrating ductal carcinoma of the right breast. She was originally diagnosed with a right ultrasound-guided biopsy in June of 2008. This revealed an infiltrating ductal carcinoma, [place] grade 2 of 3, ER-positive, MS-positive, HER2/alfredito 3+. She went on to receive a right lumpectomy with a sentinel lymph node dissection in July of 2008. [place] lymph nodes were negative for malignancy. She began adjuvant TCH (Taxotere, carboplatin, and Herceptin) and received 6 cycles of chemotherapy, completed in December of 2008. She then went on to complete a whole full year or Herceptin. She is status post breast radiation completed in February of 2009. She has been on Arimidex since March of 2009.     INTERVAL HISTORY:  Milady is doing well today. Her only update since her last visit here is that early in the year, she had an isolated episode of vaginal bleeding. She underwent a work-up, which was unrevealing. She has not had any further episodes. No other symptoms. No concerns with her breasts. No fevers, infections, breathing changes, chest pain, new lumps/bumps, new pain, headaches, or neurologic symptoms. Remainder of 10-pt ROS otherwise was negative.  MEDICATIONS:   Current Outpatient Prescriptions   Medication Sig Dispense Refill     isosorbide mononitrate (IMDUR) 60 MG 24 hr tablet Take 1 tablet (60 mg) by mouth daily 91 tablet 3     anastrozole (ARIMIDEX) 1 MG tablet Take 1 tablet (1 mg) by mouth daily 90 tablet 0     nitroGLYcerin (NITROSTAT) 0.4 MG sublingual tablet Place 1 tablet (0.4 mg) under the tongue every 5 minutes as needed for chest pain 25 tablet 1     denosumab (PROLIA) 60 MG/ML SOLN injection Inject 60 mg Subcutaneous once       diclofenac (VOLTAREN) 1 % GEL topical gel Apply 4 grams to knees or 2 grams to hands four times daily using enclosed dosing card. 100 g 1      metoprolol (TOPROL-XL) 25 MG 24 hr tablet Take 1 tablet (25 mg) by mouth daily 90 tablet 4     Omega-3 Fatty Acids (OMEGA 3 PO) Take  by mouth. 1 every two days       aspirin EC 81 MG EC tablet Take 1 tablet by mouth daily.       atorvastatin (LIPITOR) 40 MG tablet Take 40 mg by mouth daily.       amitriptyline (ELAVIL) 25 MG tablet Take 1-2 tablets by mouth At Bedtime.       lisinopril (PRINIVIL,ZESTRIL) 10 MG tablet Take 1 tablet by mouth daily 5 mg total       Multiple Vitamin (MULTIVITAMINS PO) Take 1 tablet by mouth daily.       oxazepam (SERAX) 10 MG capsule Take 1 capsule by mouth daily.       ranitidine (ZANTAC) 150 MG tablet Take 1 tablet by mouth 2 times daily. In the morning and in the evening.            ALLERGIES:    Allergies   Allergen Reactions     Nkda [No Known Drug Allergies]        PHYSICAL EXAMINATION:  Vitals: /68  Pulse 72  Temp 98  F (36.7  C) (Tympanic)  Resp 16  Wt 72.6 kg (160 lb)  SpO2 98%  BMI 26.63 kg/m2  GENERAL:  A pleasant person in no acute distress.   HEENT:  Sclerae are nonicteric.    NECK:  Supple.   LYMPH NODES:  No peripheral lymphadenopathy noted in the axillary, supraclavicular, or cervical regions.   LUNGS:  Clear to auscultation bilaterally.   HEART:  Regular rate and rhythm, with systolic murmur appreciated.   ABDOMEN:  Bowel sounds are active.  Soft and nontender.  No hepatosplenomegaly or other masses appreciated.  LOWER EXTREMITIES:  Without pitting edema to the knees bilaterally.   NEUROLOGICAL:  Alert/orientated/able to answer all questions.  CN grossly intact.  BREAST: Right breast normal to inspection.  There continues to be a mild thickening of the right breast without any dominant masses, improving. Left breast is notable for the inverted nipple which is stable. No dominant masses.      LAB:       IMPRESSION/PLAN:   1.  Stage I (T1 N0 M0) infiltrating ductal carcinoma of the right breast, ER positive, TN positive, HER-2 positive.  Ms. Krishna continues to  do well with no evidence for progressive disease. Plan to continue the Arimidex for a 10-year course, which will be completed in 03/2019.  I will have her follow up with Dr. Caal in 6 months.   2.  Bone health.  DEXA scan in 08/2016 was consistent with low bone density, with prior reports stating she had osteoporosis.  She was on Zometa, and then went on to complete one year of Prolia. She remains off any bisphosphonates now.   3. Vaginal bleeding. Work-up was negative. She understands to let somebody know if this recurs.  Darcy Bruce PA-C

## 2018-02-16 NOTE — NURSING NOTE
"Oncology Rooming Note    February 16, 2018 1:45 PM   Shannon Krishna is a 77 year old female who presents for:    Chief Complaint   Patient presents with     Oncology Clinic Visit     Breast CA 6 mo f/u      Initial Vitals: /68  Pulse 72  Temp 98  F (36.7  C) (Tympanic)  Resp 16  Wt 72.6 kg (160 lb)  SpO2 98%  BMI 26.63 kg/m2 Estimated body mass index is 26.63 kg/(m^2) as calculated from the following:    Height as of 11/29/17: 1.651 m (5' 5\").    Weight as of this encounter: 72.6 kg (160 lb). Body surface area is 1.82 meters squared.  No Pain (0) Comment: Data Unavailable   No LMP recorded. Patient is postmenopausal.  Allergies reviewed: Yes  Medications reviewed: Yes    Medications: Medication refills not needed today.  Pharmacy name entered into EPIC:    [facility] - [place], [place] - [address]  WRITTEN PRESCRIPTION REQUESTED  [facility] [place], [place] - [phone]  [facility] [place], [place] - [address]    Clinical concerns: No concerns  Provider was NOT notified.    7 minutes for nursing intake (face to face time)     Roselyn Guzmán              "

## 2018-02-16 NOTE — MR AVS SNAPSHOT
After Visit Summary   2/16/2018    Shannon Krishna    MRN: [id number]           Patient Information     Date Of Birth          1940        Visit Information        Provider Department      2/16/2018 1:40 PM Darcy Bruce PA-C [facility]        Today's Diagnoses     Malignant neoplasm of upper-outer quadrant of right breast in female, estrogen receptor positive (H)    -  1       Follow-ups after your visit        Your next 10 appointments already scheduled     May 01, 2018 10:30 AM CDT   Ech Complete with PAULETTE   [facility] ([facility])    [address] [place] [address]  3rd [facility] [id number] [phone]           1. Please bring or wear a comfortable two-piece outfit. 2. You may eat, drink and take your normal medicines. 3. For any questions that cannot be answered, please contact the ordering physician            May 01, 2018 11:30 AM CDT   (Arrive by 11:15 AM)   Return Visit with NURYS Shin MD   [facility] ([facility])    [address] [place] [address]  [address]  [facility] [id number] [phone]            May 16, 2018 11:30 AM CDT   (Arrive by 11:15 AM)   Return Visit with Davon Lara MD   [facility] ([facility])    [address] [place] [address]  4th [facility] [id number] [phone]            Aug 28, 2018  2:00 PM CDT   (Arrive by 1:45 PM)   Return Visit with Elias Caal MD   [facility] ([facility])    [address] [place] [address]  [address]  [facility] [id number] [phone]              Who to contact     If you have questions or need follow up information about today's clinic visit or your schedule please contact [facility] directly at [phone].  Normal or non-critical lab and imaging results will be communicated to you by MyChart, letter or  phone within 4 business days after the clinic has received the results. If you do not hear from us within 7 days, please contact the clinic through [employer]ician[employer]al or phone. If you have a critical or abnormal lab result, we will notify you by phone as soon as possible.  Submit refill requests through PhysicianPortal or call your pharmacy and they will forward the refill request to us. Please allow 3 business days for your refill to be completed.          Additional Information About Your Visit        [employer] Information     PhysicianPortal gives you secure access to your electronic health record. If you see a primary care provider, you can also send messages to your care team and make appointments. If you have questions, please call your primary care clinic.  If you do not have a primary care provider, please call [phone] and they will assist you.        Care EveryWhere ID     This is your Care EveryWhere ID. This could be used by other organizations to access your [place] medical records  [id number]        Your Vitals Were     Pulse Temperature Respirations Pulse Oximetry BMI (Body Mass Index)       72 98  F (36.7  C) (Tympanic) 16 98% 26.63 kg/m2        Blood Pressure from Last 3 Encounters:   02/16/18 120/68   11/29/17 130/78   08/29/17 109/59    Weight from Last 3 Encounters:   02/16/18 72.6 kg (160 lb)   11/29/17 70.1 kg (154 lb 8 oz)   08/29/17 68.7 kg (151 lb 8 oz)              Today, you had the following     No orders found for display       Primary Care Provider Office Phone # Fax #    Valeria NAVARRETE [facility] [phone]       [address] [facility] [address]        Equal Access to Services     PEGGY REGALADO : Hadii briigtte al [place], waaxda luqadaha, qaybta kaalmada lul cantu. So [facility] [phone].    ATENCIÓN: Si habla español, tiene a ang disposición servicios gratuitos de asistencia lingüística. Llame al  [phone].    We comply with applicable federal civil rights laws and [place] laws. We do not discriminate on the basis of race, color, national origin, age, disability, sex, sexual orientation, or gender identity.            Thank you!     Thank you for choosing [facility]  for your care. Our goal is always to provide you with excellent care. Hearing back from our patients is one way we can continue to improve our services. Please take a few minutes to complete the written survey that you may receive in the mail after your visit with us. Thank you!             Your Updated Medication List - Protect others around you: Learn how to safely use, store and throw away your medicines at www.disposemymeds.org.          This list is accurate as of 2/16/18  2:11 PM.  Always use your most recent med list.                   Brand Name Dispense Instructions for use Diagnosis    amitriptyline 25 MG tablet    ELAVIL     Take 1-2 tablets by mouth At Bedtime.        anastrozole 1 MG tablet    ARIMIDEX    90 tablet    Take 1 tablet (1 mg) by mouth daily    Breast cancer of upper-outer quadrant of right female breast (H)       aspirin 81 MG EC tablet      Take 1 tablet by mouth daily.    CAD (coronary artery disease)       diclofenac 1 % Gel topical gel    VOLTAREN    100 g    Apply 4 grams to knees or 2 grams to hands four times daily using enclosed dosing card.    Primary osteoarthritis of right knee       isosorbide mononitrate 60 MG 24 hr tablet    IMDUR    91 tablet    Take 1 tablet (60 mg) by mouth daily    CAD (coronary artery disease)       LIPITOR 40 MG tablet   Generic drug:  atorvastatin      Take 40 mg by mouth daily.        lisinopril 10 MG tablet    PRINIVIL/ZESTRIL     Take 1 tablet by mouth daily 5 mg total        metoprolol succinate 25 MG 24 hr tablet    TOPROL-XL    90 tablet    Take 1 tablet (25 mg) by mouth daily    CAD (coronary artery disease)       MULTIVITAMINS PO      Take 1 tablet  by mouth daily.        nitroGLYcerin 0.4 MG sublingual tablet    NITROSTAT    25 tablet    Place 1 tablet (0.4 mg) under the tongue every 5 minutes as needed for chest pain    CAD (coronary artery disease)       OMEGA 3 PO      Take  by mouth. 1 every two days        oxazepam 10 MG capsule    SERAX     Take 1 capsule by mouth daily.        PROLIA 60 MG/ML Soln injection   Generic drug:  denosumab      Inject 60 mg Subcutaneous once        ranitidine 150 MG tablet    ZANTAC     Take 1 tablet by mouth 2 times daily. In the morning and in the evening.

## 2018-02-16 NOTE — PROGRESS NOTES
DIAGNOSIS:  Stage I (T1 N0 M0) infiltrating ductal carcinoma of the right breast. She was originally diagnosed with a right ultrasound-guided biopsy in June of 2008. This revealed an infiltrating ductal carcinoma, Barrington grade 2 of 3, ER-positive, [place]-positive, HER2/alfredito 3+. She went on to receive a right lumpectomy with a sentinel lymph node dissection in July of 2008. [place] lymph nodes were negative for malignancy. She began adjuvant TCH (Taxotere, carboplatin, and Herceptin) and received 6 cycles of chemotherapy, completed in December of 2008. She then went on to complete a whole full year or Herceptin. She is status post breast radiation completed in February of 2009. She has been on Arimidex since March of 2009.     INTERVAL HISTORY:  Milady is doing well today. Her only update since her last visit here is that early in the year, she had an isolated episode of vaginal bleeding. She underwent a work-up, which was unrevealing. She has not had any further episodes. No other symptoms. No concerns with her breasts. No fevers, infections, breathing changes, chest pain, new lumps/bumps, new pain, headaches, or neurologic symptoms. Remainder of 10-pt ROS otherwise was negative.  MEDICATIONS:   Current Outpatient Prescriptions   Medication Sig Dispense Refill     isosorbide mononitrate (IMDUR) 60 MG 24 hr tablet Take 1 tablet (60 mg) by mouth daily 91 tablet 3     anastrozole (ARIMIDEX) 1 MG tablet Take 1 tablet (1 mg) by mouth daily 90 tablet 0     nitroGLYcerin (NITROSTAT) 0.4 MG sublingual tablet Place 1 tablet (0.4 mg) under the tongue every 5 minutes as needed for chest pain 25 tablet 1     denosumab (PROLIA) 60 MG/ML SOLN injection Inject 60 mg Subcutaneous once       diclofenac (VOLTAREN) 1 % GEL topical gel Apply 4 grams to knees or 2 grams to hands four times daily using enclosed dosing card. 100 g 1     metoprolol (TOPROL-XL) 25 MG 24 hr tablet Take 1 tablet (25 mg) by mouth daily 90 tablet 4     Omega-3  Fatty Acids (OMEGA 3 PO) Take  by mouth. 1 every two days       aspirin EC 81 MG EC tablet Take 1 tablet by mouth daily.       atorvastatin (LIPITOR) 40 MG tablet Take 40 mg by mouth daily.       amitriptyline (ELAVIL) 25 MG tablet Take 1-2 tablets by mouth At Bedtime.       lisinopril (PRINIVIL,ZESTRIL) 10 MG tablet Take 1 tablet by mouth daily 5 mg total       Multiple Vitamin (MULTIVITAMINS PO) Take 1 tablet by mouth daily.       oxazepam (SERAX) 10 MG capsule Take 1 capsule by mouth daily.       ranitidine (ZANTAC) 150 MG tablet Take 1 tablet by mouth 2 times daily. In the morning and in the evening.            ALLERGIES:    Allergies   Allergen Reactions     Nkda [No Known Drug Allergies]        PHYSICAL EXAMINATION:  Vitals: /68  Pulse 72  Temp 98  F (36.7  C) (Tympanic)  Resp 16  Wt 72.6 kg (160 lb)  SpO2 98%  BMI 26.63 kg/m2  GENERAL:  A pleasant person in no acute distress.   HEENT:  Sclerae are nonicteric.    NECK:  Supple.   LYMPH NODES:  No peripheral lymphadenopathy noted in the axillary, supraclavicular, or cervical regions.   LUNGS:  Clear to auscultation bilaterally.   HEART:  Regular rate and rhythm, with systolic murmur appreciated.   ABDOMEN:  Bowel sounds are active.  Soft and nontender.  No hepatosplenomegaly or other masses appreciated.  LOWER EXTREMITIES:  Without pitting edema to the knees bilaterally.   NEUROLOGICAL:  Alert/orientated/able to answer all questions.  CN grossly intact.  BREAST: Right breast normal to inspection.  There continues to be a mild thickening of the right breast without any dominant masses, improving. Left breast is notable for the inverted nipple which is stable. No dominant masses.      LAB:       IMPRESSION/PLAN:   1.  Stage I (T1 N0 M0) infiltrating ductal carcinoma of the right breast, ER positive, WI positive, HER-2 positive.  Ms. Krishna continues to do well with no evidence for progressive disease. Plan to continue the Arimidex for a 10-year course,  which will be completed in 03/2019.  I will have her follow up with Dr. Caal in 6 months.   2.  Bone health.  DEXA scan in 08/2016 was consistent with low bone density, with prior reports stating she had osteoporosis.  She was on Zometa, and then went on to complete one year of Prolia. She remains off any bisphosphonates now.   3. Vaginal bleeding. Work-up was negative. She understands to let somebody know if this recurs.  Darcy Bruce PA-C

## 2018-03-03 ENCOUNTER — NURSE TRIAGE (OUTPATIENT)
Dept: NURSING | Facility: CLINIC | Age: 78
End: 2018-03-03

## 2018-03-03 NOTE — TELEPHONE ENCOUNTER
"  Reason for Disposition    [1] MODERATE dizziness (e.g., interferes with normal activities) AND [2] has NOT been evaluated by physician for this  (Exception: dizziness caused by heat exposure, sudden standing, or poor fluid intake)    Additional Information    Negative: Severe difficulty breathing (e.g., struggling for each breath, speaks in single words)    Negative: [1] Difficulty breathing or swallowing AND [2] started suddenly after medicine, an allergic food or bee sting    Negative: Shock suspected (e.g., cold/pale/clammy skin, too weak to stand, low BP, rapid pulse)    Negative: Difficult to awaken or acting confused  (e.g., disoriented, slurred speech)    Negative: [1] Weakness (i.e., paralysis, loss of muscle strength) of the face, arm or leg on one side of the body AND [2] sudden onset AND [3] present now    Negative: [1] Numbness (i.e., loss of sensation) of the face, arm or leg on one side of the body AND [2] sudden onset AND [3] present now    Negative: [1] Loss of speech or garbled speech AND [2] sudden onset AND [3] present now    Negative: Overdose (accidental or intentional) of medications    Negative: [1] Fainted > 15 minutes ago AND [2] still feels too weak or dizzy to stand    Negative: Heart beating < 50 beats per minute OR > 140 beats per minute    Negative: Sounds like a life-threatening emergency to the triager    Negative: Chest pain    Negative: Rectal bleeding, bloody stool, or tarry-black stool    Negative: [1] Vomiting AND [2] contains red blood or black (\"coffee ground\") material    Negative: Vomiting is main symptom    Negative: Diarrhea is main symptom    Negative: Headache is main symptom    Negative: Patient states that he/she is having an anxiety/panic attack    Negative: Dizziness from low blood sugar (i.e., < 60 mg/dl or 3.5 mmol/l)    Negative: Dizziness is described as a spinning sensation (i.e., vertigo)    Negative: Heat exhaustion suspected    Negative: Difficulty " "breathing    Negative: SEVERE dizziness (e.g., unable to stand, requires support to walk, feels like passing out now)    Negative: Extra heart beats OR irregular heart beating  (i.e., \"palpitations\")    Negative: [1] Drinking very little AND [2] dehydration suspected (e.g., no urine > 12 hours, very dry mouth, very lightheaded)    Negative: Patient sounds very sick or weak to the triager    Negative: [1] Dizziness caused by heat exposure, sudden standing, or poor fluid intake AND [2] no improvement after 2 hours of rest and fluids    Negative: [1] Fever > 103 F (39.4 C) AND [2] not able to get the fever down using Fever Care Advice    Negative: [1] Fever > 101 F (38.3 C) AND [2] age > 60    Negative: [1] Fever > 101 F (38.3 C) AND [2] bedridden (e.g., nursing home patient, CVA, chronic illness, recovering from surgery)    Negative: [1] Fever > 100.5 F (38.1 C) AND [2] diabetes mellitus or weak immune system (e.g., HIV positive, cancer chemo, splenectomy, organ transplant, chronic steroids)    Protocols used: DIZZINESS - LIGHTHEADEDNESS-ADULT-AH    Patient reports slipping on ice and falling on her left side.  She reports that her pain has subsided, but that she is feeling lightheaded.  Patient was advised to see a physician within 24 hours per guideline.  She reports that she is going to lay down now, but will go to [facility] ED if her pain returns.    Preethi Sorensen RN  [facility]    "

## 2018-03-10 ENCOUNTER — TELEPHONE (OUTPATIENT)
Dept: ONCOLOGY | Facility: CLINIC | Age: 78
End: 2018-03-10

## 2018-03-10 DIAGNOSIS — N93.9 VAGINAL BLEEDING: Primary | ICD-10-CM

## 2018-03-10 NOTE — TELEPHONE ENCOUNTER
I called Shannon and she had an episode of vaginal bleeding in the fall, which was significant and covered her pants.  She went to [facility] and had a negative ultrasound and exam.  No further occurrence.  We will arrange a transvaginal ultrasound and gyn exam at [place].     Plan discussed with the patient and she will call Nichole early next week.    Elias

## 2018-03-12 ENCOUNTER — CARE COORDINATION (OUTPATIENT)
Dept: ONCOLOGY | Facility: CLINIC | Age: 78
End: 2018-03-12

## 2018-03-12 NOTE — PROGRESS NOTES
Patient returned call as Dr Caal recommended with bright red blood in [facility] November 9, 17 and saw her PMD initially and then was referred to Dr Barba at [facility] [place] 11/29/17 to be seen post bleeding. Will send a message to Dr Caal with the follow up.  Answered all patient's questions and verbalized understanding. Nichole Phelps RN, BSN.

## 2018-04-05 DIAGNOSIS — C50.411 BREAST CANCER OF UPPER-OUTER QUADRANT OF RIGHT FEMALE BREAST (H): ICD-10-CM

## 2018-04-05 RX ORDER — ANASTROZOLE 1 MG/1
1 TABLET ORAL DAILY
Qty: 90 TABLET | Refills: 3 | Status: SHIPPED | OUTPATIENT
Start: 2018-04-05 | End: 2019-04-12

## 2018-04-19 ENCOUNTER — TELEPHONE (OUTPATIENT)
Dept: CARDIOLOGY | Facility: CLINIC | Age: 78
End: 2018-04-19

## 2018-04-19 NOTE — TELEPHONE ENCOUNTER
[facility] Call Center    Phone Message    May a detailed message be left on voicemail: yes    Reason for Call: Other: Pt requesting a call back from Jay Jay Rodriguez     Action Taken: Message routed to:  [facility]

## 2018-04-20 ENCOUNTER — CARE COORDINATION (OUTPATIENT)
Dept: CARDIOLOGY | Facility: CLINIC | Age: 78
End: 2018-04-20

## 2018-04-20 NOTE — TELEPHONE ENCOUNTER
Patient sees Dr. Harvey.  Telephone encounter noted yesterday from Jay Jay Martinez RN.  I will forward to him.

## 2018-04-23 DIAGNOSIS — I25.10 CAD (CORONARY ARTERY DISEASE): ICD-10-CM

## 2018-04-23 RX ORDER — METOPROLOL SUCCINATE 25 MG/1
25 TABLET, EXTENDED RELEASE ORAL DAILY
Qty: 90 TABLET | Refills: 3 | Status: SHIPPED | OUTPATIENT
Start: 2018-04-23 | End: 2019-04-16

## 2018-04-23 ASSESSMENT — ENCOUNTER SYMPTOMS
HYPOTENSION: 0
SYNCOPE: 0
PALPITATIONS: 0
SLEEP DISTURBANCES DUE TO BREATHING: 0
HYPERTENSION: 0
LEG PAIN: 0
LIGHT-HEADEDNESS: 0
EXERCISE INTOLERANCE: 0
ORTHOPNEA: 0

## 2018-05-01 ENCOUNTER — OFFICE VISIT (OUTPATIENT)
Dept: CARDIOLOGY | Facility: CLINIC | Age: 78
End: 2018-05-01
Attending: INTERNAL MEDICINE
Payer: MEDICARE

## 2018-05-01 ENCOUNTER — RADIANT APPOINTMENT (OUTPATIENT)
Dept: CARDIOLOGY | Facility: CLINIC | Age: 78
End: 2018-05-01
Payer: MEDICARE

## 2018-05-01 VITALS
BODY MASS INDEX: 25.43 KG/M2 | SYSTOLIC BLOOD PRESSURE: 132 MMHG | DIASTOLIC BLOOD PRESSURE: 68 MMHG | WEIGHT: 152.6 LBS | OXYGEN SATURATION: 97 % | HEART RATE: 79 BPM | HEIGHT: 65 IN

## 2018-05-01 DIAGNOSIS — I35.9 AORTIC VALVE DISORDER: ICD-10-CM

## 2018-05-01 DIAGNOSIS — I35.9 AORTIC VALVE DISORDER: Primary | ICD-10-CM

## 2018-05-01 PROCEDURE — G0463 HOSPITAL OUTPT CLINIC VISIT: HCPCS | Mod: ZF

## 2018-05-01 PROCEDURE — 99214 OFFICE O/P EST MOD 30 MIN: CPT | Mod: ZP | Performed by: INTERNAL MEDICINE

## 2018-05-01 ASSESSMENT — PAIN SCALES - GENERAL: PAINLEVEL: NO PAIN (0)

## 2018-05-01 NOTE — MR AVS SNAPSHOT
After Visit Summary   5/1/2018    Shannon Krishna    MRN: [id number]           Patient Information     Date Of Birth          1940        Visit Information        Provider Department      5/1/2018 9:30 AM NURYS Shin MD [facility]        Today's Diagnoses     CAD (coronary artery disease)    -  1      Care Instructions    Thank you for your visit today.  Please call me with any questions or concerns.   Jay Jay Martinez RN  Cardiology Care Coordinator  [phone], press option 1 then option 3          Follow-ups after your visit        Additional Services     Follow-Up with Cardiologist       Please schedule an echocardiogram with follow up clinic visit, same day, in 6 months.                  Your next 10 appointments already scheduled     May 16, 2018 11:30 AM CDT   (Arrive by 11:15 AM)   Return Visit with Davon Lara MD   [facility] ([facility])    [address] [place] [address]  4th Floor  [facility] [id number] [phone]            Aug 28, 2018  2:00 PM CDT   (Arrive by 1:45 PM)   Return Visit with Elias Caal MD   [facility] ([facility])    [address]  [address]  [facility] [id number] [phone]            Nov 06, 2018  9:30 AM CST   Ech Complete with PAULETTE   [facility] ([facility])    [address]  3rd Floor  [facility] [id number] [phone]           1. Please bring or wear a comfortable two-piece outfit. 2. You may eat, drink and take your normal medicines. 3. For any questions that cannot be answered, please contact the ordering physician            Nov 06, 2018 10:30 AM CST   (Arrive by 10:15 AM)   Return Visit with NURYS Shin MD   [facility] ([facility])    [address] [place] [address]  [address]  [facility] [id number] [phone]              Future tests that  "were ordered for you today     Open Future Orders        Priority Expected Expires Ordered    Follow-Up with Cardiologist Routine 10/28/2018 1/26/2019 5/1/2018    Echo Complete Routine 10/28/2018 1/26/2019 5/1/2018            Who to contact     If you have questions or need follow up information about today's clinic visit or your schedule please contact [facility] directly at [phone].  Normal or non-critical lab and imaging results will be communicated to you by [employer], letter or phone within 4 business days after the clinic has received the results. If you do not hear from us within 7 days, please contact the clinic through [employer] or phone. If you have a critical or abnormal lab result, we will notify you by phone as soon as possible.  Submit refill requests through [employer] or call your pharmacy and they will forward the refill request to us. Please allow 3 business days for your refill to be completed.          Additional Information About Your Visit        [employer] Information     [employer] gives you secure access to your electronic health record. If you see a primary care provider, you can also send messages to your care team and make appointments. If you have questions, please call your primary care clinic.  If you do not have a primary care provider, please call [phone] and they will assist you.        Care EveryWhere ID     This is your Care EveryWhere ID. This could be used by other organizations to access your [place] medical records  [id number]        Your Vitals Were     Pulse Height Pulse Oximetry BMI (Body Mass Index)          79 1.651 m (5' 5\") 97% 25.39 kg/m2         Blood Pressure from Last 3 Encounters:   05/01/18 132/68   02/16/18 120/68   11/29/17 130/78    Weight from Last 3 Encounters:   05/01/18 69.2 kg (152 lb 9.6 oz)   02/16/18 72.6 kg (160 lb)   11/29/17 70.1 kg (154 lb 8 oz)               Primary Care Provider Office Phone # Fax #    Valeria Reynolds " [phone]       [facility] [address]  [facility] [address]        Equal Access to Services     PEGGY REGALADO : Hadii aad ku hadnoradianna Adkins, petrosanish waltondavidha, lorettakristin raymondbelkis cantu, lul ryanin hayaan jamesjanes castillo laTrevormary juarez. So [facility] [phone].    ATENCIÓN: Si habla español, tiene a ang disposición servicios gratuitos de asistencia lingüística. Llame al [phone].    We comply with applicable federal civil rights laws and [place] laws. We do not discriminate on the basis of race, color, national origin, age, disability, sex, sexual orientation, or gender identity.            Thank you!     Thank you for choosing [facility]  for your care. Our goal is always to provide you with excellent care. Hearing back from our patients is one way we can continue to improve our services. Please take a few minutes to complete the written survey that you may receive in the mail after your visit with us. Thank you!             Your Updated Medication List - Protect others around you: Learn how to safely use, store and throw away your medicines at www.disposemymeds.org.          This list is accurate as of 5/1/18 10:33 AM.  Always use your most recent med list.                   Brand Name Dispense Instructions for use Diagnosis    amitriptyline 25 MG tablet    ELAVIL     Take 1-2 tablets by mouth At Bedtime.        anastrozole 1 MG tablet    ARIMIDEX    90 tablet    Take 1 tablet (1 mg) by mouth daily    Breast cancer of upper-outer quadrant of right female breast (H)       aspirin 81 MG EC tablet      Take 1 tablet by mouth daily.    CAD (coronary artery disease)       diclofenac 1 % Gel topical gel    VOLTAREN    100 g    Apply 4 grams to knees or 2 grams to hands four times daily using enclosed dosing card.    Primary osteoarthritis of right knee       isosorbide mononitrate 60 MG 24 hr tablet    IMDUR    91 tablet    Take 1 tablet (60 mg) by mouth daily    CAD  (coronary artery disease)       LIPITOR 40 MG tablet   Generic drug:  atorvastatin      Take 40 mg by mouth daily.        lisinopril 10 MG tablet    PRINIVIL/ZESTRIL     Take 1 tablet by mouth daily 10 mg total        metoprolol succinate 25 MG 24 hr tablet    TOPROL-XL    90 tablet    Take 1 tablet (25 mg) by mouth daily    CAD (coronary artery disease)       MULTIVITAMINS PO      Take 1 tablet by mouth daily.        nitroGLYcerin 0.4 MG sublingual tablet    NITROSTAT    25 tablet    Place 1 tablet (0.4 mg) under the tongue every 5 minutes as needed for chest pain    CAD (coronary artery disease)       OMEGA 3 PO      Take  by mouth. 1 every two days        oxazepam 10 MG capsule    SERAX     Take 1 capsule by mouth daily.        PROLIA 60 MG/ML Soln injection   Generic drug:  denosumab      Inject 60 mg Subcutaneous once        ranitidine 150 MG tablet    ZANTAC     Take 1 tablet by mouth 2 times daily. In the morning and in the evening.

## 2018-05-01 NOTE — PROGRESS NOTES
SUBJECTIVE:  Shannon Krishna is a 77 year old female who presents for follow up.   of RCA. Well collateralized. Minimal symptoms. Normal Lexiscan.  Aortic stenosis. HTN and HLD.    Breast Cancer in past. S/P Chemo and XRT.    Currently asymptomatic.    Patient Active Problem List    Diagnosis Date Noted     Bilateral impacted cerumen 11/20/2017     Priority: Medium     Collapse of both external ear canals 11/20/2017     Priority: Medium     Personal history of other drug therapy 08/16/2016     Priority: Medium     Breast cancer of upper-outer quadrant of right female breast (H) 12/16/2015     Priority: Medium     Osteoporosis 08/06/2013     Priority: Medium     Imo Update utility       Status post coronary angiogram 09/27/2012     Priority: Medium     Coronary artery disease 09/27/2012     Priority: Medium     S/P angioplasty 08/30/2012     Priority: Medium     CAD (coronary artery disease) 08/30/2012     Priority: Medium     Aortic sclerosis      Priority: Medium     Hypertension      Priority: Medium     Hyperlipidemia      Priority: Medium     Esophageal reflux      Priority: Medium     Adenocarcinoma of breast (H)      Priority: Medium     Breast cancer (H) 03/16/2011     Priority: Medium    .  Current Outpatient Prescriptions   Medication Sig     amitriptyline (ELAVIL) 25 MG tablet Take 1-2 tablets by mouth At Bedtime.     anastrozole (ARIMIDEX) 1 MG tablet Take 1 tablet (1 mg) by mouth daily     aspirin EC 81 MG EC tablet Take 1 tablet by mouth daily.     atorvastatin (LIPITOR) 40 MG tablet Take 40 mg by mouth daily.     isosorbide mononitrate (IMDUR) 60 MG 24 hr tablet Take 1 tablet (60 mg) by mouth daily     lisinopril (PRINIVIL,ZESTRIL) 10 MG tablet Take 1 tablet by mouth daily 10 mg total     metoprolol succinate (TOPROL-XL) 25 MG 24 hr tablet Take 1 tablet (25 mg) by mouth daily     Multiple Vitamin (MULTIVITAMINS PO) Take 1 tablet by mouth daily.     nitroGLYcerin (NITROSTAT) 0.4 MG sublingual  tablet Place 1 tablet (0.4 mg) under the tongue every 5 minutes as needed for chest pain     oxazepam (SERAX) 10 MG capsule Take 1 capsule by mouth daily.     ranitidine (ZANTAC) 150 MG tablet Take 1 tablet by mouth 2 times daily. In the morning and in the evening.      denosumab (PROLIA) 60 MG/ML SOLN injection Inject 60 mg Subcutaneous once     diclofenac (VOLTAREN) 1 % GEL topical gel Apply 4 grams to knees or 2 grams to hands four times daily using enclosed dosing card. (Patient not taking: Reported on 5/1/2018)     Omega-3 Fatty Acids (OMEGA 3 PO) Take  by mouth. 1 every two days     No current facility-administered medications for this visit.      Past Medical History:   Diagnosis Date     Adenocarcinoma of breast (H)      Adenomatous polyp of colon 2003     Aortic sclerosis     Mild to moderate sclerosis without significant stenosis.     Arthritis 2015     Coronary artery disease     Multivessel CAD,  of the mid RCA     Esophageal reflux      Hearing problem 2010     History of radiation therapy 2008     Hyperlipidemia      Hypertension      Osteoporosis      Past Surgical History:   Procedure Laterality Date     APPENDECTOMY      1950's     BIOPSY  2008    ultrasound guided biopsy of right breast     BREAST SURGERY       CARDIAC SURGERY  2008     COLONOSCOPY  2015     GENITOURINARY SURGERY  1970    cryosurgery on vagina     GENITOURINARY SURGERY  1975    conization     GI SURGERY  1960's    perforated ulcer     GYN SURGERY  1975     LUMPECTOMY BREAST  2008    breast cancer, done in [facility]     port-a-cath  2008    Removed in 2009     TONSILLECTOMY  1950     VASCULAR SURGERY       Allergies   Allergen Reactions     Nkda [No Known Drug Allergies]      Social History     Social History     Marital status: Legally      Spouse name: N/A     Number of children: N/A     Years of education: N/A     Occupational History     Not on file.     Social History Main Topics     Smoking status: Former Smoker  "    Packs/day: 1.00     Years: 30.00     Types: Cigarettes     Start date: 1/1/1960     Quit date: 1/1/1990     Smokeless tobacco: Former User     Quit date: 6/7/1994     Alcohol use No     Drug use: No     Sexual activity: Not Currently     Birth control/ protection: None     Other Topics Concern     Not on file     Social History Narrative     Family History   Problem Relation Age of Onset     Family History Negative Other      CANCER Mother      Breast Cancer Mother      DIABETES Father      HEART DISEASE Father      CEREBROVASCULAR DISEASE Father      ,, ,, ,, ,, ,, ,, ,, ,     HEART DISEASE Brother      Hyperlipidemia Brother      Glaucoma No family hx of      Macular Degeneration No family hx of      Amblyopia No family hx of      Coronary Artery Disease No family hx of           REVIEW OF SYSTEMS:  General: negative, fever, chills, night sweats  Skin: negative, acne, rash and scaling  Eyes: negative, double vision, eye pain and photophobia  Ears/Nose/Throat: negative, nasal congestion and purulent rhinorrhea  Respiratory: No dyspnea on exertion, No cough, No hemoptysis and negative  Cardiovascular: negative, palpitations, tachycardia, irregular heart beat, chest pain, exertional chest pain or pressure, paroxysmal nocturnal dyspnea, dyspnea on exertion and orthopnea         OBJECTIVE:  Blood pressure 132/68, pulse 79, height 1.651 m (5' 5\"), weight 69.2 kg (152 lb 9.6 oz), SpO2 97 %, not currently breastfeeding.  General Appearance: alert, active and no distress  Head: Normocephalic. No masses, lesions, tenderness or abnormalities  Eyes: conjuctiva clear, PERRL, EOM intact  Ears: External ears normal. Canals clear. TM's normal.  Nose: Nares normal  Mouth: normal  Neck: Supple, no cervical adenopathy, no thyromegaly  Lungs: clear to auscultation  Cardiac: regular rate and rhythm, normal S1 and S2, ESM/EDM. Single S2.       ASSESSMENT/PLAN:  Patient with  of RCA. Asymptomatic. Occational rest pain. Non " cardiac,though take S/L NTG.  Aortic stenosis. No symptoms. Reviewed echo from [facility]. Severe Aortic stenosis with valve area 0.9cm2. Mild AI. Mean gradient 35mmHg.  Discussed progression of AS. As she is small frame and asymptomatic no reason for an emergent surgery.  Discussed options of TAVR/ Open AVR, [place] address  of RCA.  Will re-evaluate in 6 months.  Per orders.   Return to Clinic 6 months with echo.  Answers for HPI/ROS submitted by the patient on 4/23/2018   General Symptoms: No  Skin Symptoms: No  HENT Symptoms: No  EYE SYMPTOMS: No  HEART SYMPTOMS: Yes  LUNG SYMPTOMS: No  INTESTINAL SYMPTOMS: No  URINARY SYMPTOMS: No  GYNECOLOGIC SYMPTOMS: No  BREAST SYMPTOMS: No  SKELETAL SYMPTOMS: No  BLOOD SYMPTOMS: No  NERVOUS SYSTEM SYMPTOMS: No  MENTAL HEALTH SYMPTOMS: No  Chest pain or pressure: Yes  Fast or irregular heartbeat: No  Pain in legs with walking: No  Trouble breathing while lying down: No  Fingers or toes appear blue: No  High blood pressure: No  Low blood pressure: No  Fainting: No  Murmurs: No  Pacemaker: No  Varicose veins: No  Edema or swelling: No  Wake up at night with shortness of breath: No  Light-headedness: No  Exercise intolerance: No

## 2018-05-01 NOTE — NURSING NOTE
Chief Complaint   Patient presents with     Follow Up For      of RCA with good collaterals. Normal stress MPI. Aortic stenosis.     Vitals were taken and medications were reconciled. .    RENITA Xie  10:08 AM

## 2018-05-01 NOTE — NURSING NOTE
Cardiac Testing: Patient given instructions regarding  echocardiogram in 6 months.. Discussed purpose, preparation, procedure and when to expect results reported back to the patient. Patient demonstrated understanding of this information and agreed to call with further questions or concerns.  Med Reconcile: Reviewed and verified all current medications with the patient. The updated medication list was printed and given to the patient.  Return Appointment: Follow up in 6 months. Patient given instructions regarding scheduling next clinic visit. Patient demonstrated understanding of this information and agreed to call with further questions or concerns.  Patient stated she understood all health information given and agreed to call with further questions or concerns.

## 2018-05-01 NOTE — PATIENT INSTRUCTIONS
Thank you for your visit today.  Please call me with any questions or concerns.   Jay Jay Martinez RN  Cardiology Care Coordinator  [phone], press option 1 then option 3

## 2018-05-01 NOTE — LETTER
5/1/2018      RE: Shannon Krishna  [address]   [facility] [address]       Dear Colleague,    Thank you for the opportunity to participate in the care of your patient, Shannon Krishna, at the [facility] HEART [facility]. Please see a copy of my visit note below.       SUBJECTIVE:  Shannon Krishna is a 77 year old female who presents for follow up.   of RCA. Well collateralized. Minimal symptoms. Normal Lexiscan.  Aortic stenosis. HTN and HLD.    Breast Cancer in past. S/P Chemo and XRT.    Currently asymptomatic.    Patient Active Problem List    Diagnosis Date Noted     Bilateral impacted cerumen 11/20/2017     Priority: Medium     Collapse of both external ear canals 11/20/2017     Priority: Medium     Personal history of other drug therapy 08/16/2016     Priority: Medium     Breast cancer of upper-outer quadrant of right female breast (H) 12/16/2015     Priority: Medium     Osteoporosis 08/06/2013     Priority: Medium     Imo Update utility       Status post coronary angiogram 09/27/2012     Priority: Medium     Coronary artery disease 09/27/2012     Priority: Medium     S/P angioplasty 08/30/2012     Priority: Medium     CAD (coronary artery disease) 08/30/2012     Priority: Medium     Aortic sclerosis      Priority: Medium     Hypertension      Priority: Medium     Hyperlipidemia      Priority: Medium     Esophageal reflux      Priority: Medium     Adenocarcinoma of breast (H)      Priority: Medium     Breast cancer (H) 03/16/2011     Priority: Medium    .  Current Outpatient Prescriptions   Medication Sig     amitriptyline (ELAVIL) 25 MG tablet Take 1-2 tablets by mouth At Bedtime.     anastrozole (ARIMIDEX) 1 MG tablet Take 1 tablet (1 mg) by mouth daily     aspirin EC 81 MG EC tablet Take 1 tablet by mouth daily.     atorvastatin (LIPITOR) 40 MG tablet Take 40 mg by mouth daily.     isosorbide mononitrate (IMDUR) 60 MG 24 hr tablet Take  1 tablet (60 mg) by mouth daily     lisinopril (PRINIVIL,ZESTRIL) 10 MG tablet Take 1 tablet by mouth daily 10 mg total     metoprolol succinate (TOPROL-XL) 25 MG 24 hr tablet Take 1 tablet (25 mg) by mouth daily     Multiple Vitamin (MULTIVITAMINS PO) Take 1 tablet by mouth daily.     nitroGLYcerin (NITROSTAT) 0.4 MG sublingual tablet Place 1 tablet (0.4 mg) under the tongue every 5 minutes as needed for chest pain     oxazepam (SERAX) 10 MG capsule Take 1 capsule by mouth daily.     ranitidine (ZANTAC) 150 MG tablet Take 1 tablet by mouth 2 times daily. In the morning and in the evening.      denosumab (PROLIA) 60 MG/ML SOLN injection Inject 60 mg Subcutaneous once     diclofenac (VOLTAREN) 1 % GEL topical gel Apply 4 grams to knees or 2 grams to hands four times daily using enclosed dosing card. (Patient not taking: Reported on 5/1/2018)     Omega-3 Fatty Acids (OMEGA 3 PO) Take  by mouth. 1 every two days     No current facility-administered medications for this visit.      Past Medical History:   Diagnosis Date     Adenocarcinoma of breast (H)      Adenomatous polyp of colon 2003     Aortic sclerosis     Mild to moderate sclerosis without significant stenosis.     Arthritis 2015     Coronary artery disease     Multivessel CAD,  of the mid RCA     Esophageal reflux      Hearing problem 2010     History of radiation therapy 2008     Hyperlipidemia      Hypertension      Osteoporosis      Past Surgical History:   Procedure Laterality Date     APPENDECTOMY      1950's     BIOPSY  2008    ultrasound guided biopsy of right breast     BREAST SURGERY       CARDIAC SURGERY  2008     COLONOSCOPY  2015     GENITOURINARY SURGERY  1970    cryosurgery on vagina     GENITOURINARY SURGERY  1975    conization     GI SURGERY  1960's    perforated ulcer     GYN SURGERY  1975     LUMPECTOMY BREAST  2008    breast cancer, done in [facility]     port-a-cath  2008    Removed in 2009     TONSILLECTOMY  1950     VASCULAR SURGERY    "    Allergies   Allergen Reactions     Nkda [No Known Drug Allergies]      Social History     Social History     Marital status: Legally      Spouse name: N/A     Number of children: N/A     Years of education: N/A     Occupational History     Not on file.     Social History Main Topics     Smoking status: Former Smoker     Packs/day: 1.00     Years: 30.00     Types: Cigarettes     Start date: 1/1/1960     Quit date: 1/1/1990     Smokeless tobacco: Former User     Quit date: 6/7/1994     Alcohol use No     Drug use: No     Sexual activity: Not Currently     Birth control/ protection: None     Other Topics Concern     Not on file     Social History Narrative     Family History   Problem Relation Age of Onset     Family History Negative Other      CANCER Mother      Breast Cancer Mother      DIABETES Father      HEART DISEASE Father      CEREBROVASCULAR DISEASE Father      ,, ,, ,, ,, ,, ,, ,, ,     HEART DISEASE Brother      Hyperlipidemia Brother      Glaucoma No family hx of      Macular Degeneration No family hx of      Amblyopia No family hx of      Coronary Artery Disease No family hx of           REVIEW OF SYSTEMS:  General: negative, fever, chills, night sweats  Skin: negative, acne, rash and scaling  Eyes: negative, double vision, eye pain and photophobia  Ears/Nose/Throat: negative, nasal congestion and purulent rhinorrhea  Respiratory: No dyspnea on exertion, No cough, No hemoptysis and negative  Cardiovascular: negative, palpitations, tachycardia, irregular heart beat, chest pain, exertional chest pain or pressure, paroxysmal nocturnal dyspnea, dyspnea on exertion and orthopnea         OBJECTIVE:  Blood pressure 132/68, pulse 79, height 1.651 m (5' 5\"), weight 69.2 kg (152 lb 9.6 oz), SpO2 97 %, not currently breastfeeding.  General Appearance: alert, active and no distress  Head: Normocephalic. No masses, lesions, tenderness or abnormalities  Eyes: conjuctiva clear, PERRL, EOM intact  Ears: " External ears normal. Canals clear. TM's normal.  Nose: Nares normal  Mouth: normal  Neck: Supple, no cervical adenopathy, no thyromegaly  Lungs: clear to auscultation  Cardiac: regular rate and rhythm, normal S1 and S2, ESM/EDM. Single S2.       ASSESSMENT/PLAN:  Patient with  of RCA. Asymptomatic. Occational rest pain. Non cardiac,though take S/L NTG.  Aortic stenosis. No symptoms. Reviewed echo from [facility]. Severe Aortic stenosis with valve area 0.9cm2. Mild AI. Mean gradient 35mmHg.  Discussed progression of AS. As she is small frame and asymptomatic no reason for an emergent surgery.  Discussed options of TAVR/ Open AVR, [place] address  of RCA.  Will re-evaluate in 6 months.  Per orders.   Return to Clinic 6 months with echo.    Sincerely,     NURYS Shin MD

## 2018-05-16 ENCOUNTER — OFFICE VISIT (OUTPATIENT)
Dept: OTOLARYNGOLOGY | Facility: CLINIC | Age: 78
End: 2018-05-16
Payer: MEDICARE

## 2018-05-16 DIAGNOSIS — H61.23 BILATERAL IMPACTED CERUMEN: Primary | ICD-10-CM

## 2018-05-16 ASSESSMENT — PAIN SCALES - GENERAL: PAINLEVEL: NO PAIN (0)

## 2018-05-16 NOTE — MR AVS SNAPSHOT
After Visit Summary   5/16/2018    Shannon Krishna    MRN: [id number]           Patient Information     Date Of Birth          1940        Visit Information        Provider Department      5/16/2018 11:30 AM Davon Lara MD [facility]        Care Instructions    You will need  to schedule a follow up appointment in 6 months for an ear cleaning.   Please call our clinic for any questions,concerns,or worsening symptoms.      Clinic #[phone]       Option 3  for the triage nurse.          Follow-ups after your visit        Your next 10 appointments already scheduled     Aug 28, 2018  2:00 PM CDT   (Arrive by 1:45 PM)   Return Visit with Elias Caal MD   [facility] ([facility] [place])    [address] [place] [address]  [address]  [facility] [id number] [phone]            Nov 06, 2018  9:30 AM CST   Ech Complete with [address] ([facility] [place])    [address] [place] [address]  3rd Floor  [facility] [id number] [phone]           1. Please bring or wear a comfortable two-piece outfit. 2. You may eat, drink and take your normal medicines. 3. For any questions that cannot be answered, please contact the ordering physician            Nov 06, 2018 10:30 AM CST   (Arrive by 10:15 AM)   Return Visit with NURYS Shin MD   [facility] ([facility] [place])    [address] [place] [address]  [address]  [facility] [id number] [phone]            Nov 16, 2018 10:30 AM CST   (Arrive by 10:15 AM)   Return Visit with Davon Lara MD   [facility] ([facility] [place])    [address] [place] [address]  4th Floor  [facility] [id number] [phone]              Who to contact     Please call your clinic at [phone] to:    Ask questions about your health    Make or cancel appointments    Discuss your medicines    Learn about your test  results    Speak to your doctor            Additional Information About Your Visit        [employer] Information     [employer] gives you secure access to your electronic health record. If you see a primary care provider, you can also send messages to your care team and make appointments. If you have questions, please call your primary care clinic.  If you do not have a primary care provider, please call [phone] and they will assist you.      [employer] is an electronic gateway that provides easy, online access to your medical records. With [employer], you can request a clinic appointment, read your test results, renew a prescription or communicate with your care team.     To access your existing account, please contact your [facility] or call [phone] for assistance.        Care EveryWhere ID     This is your Care EveryWhere ID. This could be used by other organizations to access your [place] medical records  [id number]         Blood Pressure from Last 3 Encounters:   05/01/18 132/68   02/16/18 120/68   11/29/17 130/78    Weight from Last 3 Encounters:   05/01/18 69.2 kg (152 lb 9.6 oz)   02/16/18 72.6 kg (160 lb)   11/29/17 70.1 kg (154 lb 8 oz)              Today, you had the following     No orders found for display       Primary Care Provider Office Phone # Fax #    Valeria NAVARRETE [facility] [phone]       [address] [place] [address]        Equal Access to Services     PEGGY REGALADO : Hadii aad ku hadasho Soomaali, waaxda luqadaha, qaybta kaalmada adeegyada, lul juarez. So [facility] [phone].    ATENCIÓN: Si habla español, tiene a ang disposición servicios gratuitos de asistencia lingüística. Llame al [phone].    We comply with applicable federal civil rights laws and [place] laws. We do not discriminate on the basis of race, color, national origin, age, disability, sex, sexual  orientation, or gender identity.            Thank you!     Thank you for choosing [facility]  for your care. Our goal is always to provide you with excellent care. Hearing back from our patients is one way we can continue to improve our services. Please take a few minutes to complete the written survey that you may receive in the mail after your visit with us. Thank you!             Your Updated Medication List - Protect others around you: Learn how to safely use, store and throw away your medicines at www.disposemymeds.org.          This list is accurate as of 5/16/18 11:49 AM.  Always use your most recent med list.                   Brand Name Dispense Instructions for use Diagnosis    amitriptyline 25 MG tablet    ELAVIL     Take 1-2 tablets by mouth At Bedtime.        anastrozole 1 MG tablet    ARIMIDEX    90 tablet    Take 1 tablet (1 mg) by mouth daily    Breast cancer of upper-outer quadrant of right female breast (H)       aspirin 81 MG EC tablet      Take 1 tablet by mouth daily.    CAD (coronary artery disease)       diclofenac 1 % Gel topical gel    VOLTAREN    100 g    Apply 4 grams to knees or 2 grams to hands four times daily using enclosed dosing card.    Primary osteoarthritis of right knee       isosorbide mononitrate 60 MG 24 hr tablet    IMDUR    91 tablet    Take 1 tablet (60 mg) by mouth daily    CAD (coronary artery disease)       LIPITOR 40 MG tablet   Generic drug:  atorvastatin      Take 40 mg by mouth daily.        lisinopril 10 MG tablet    PRINIVIL/ZESTRIL     Take 1 tablet by mouth daily 10 mg total        metoprolol succinate 25 MG 24 hr tablet    TOPROL-XL    90 tablet    Take 1 tablet (25 mg) by mouth daily    CAD (coronary artery disease)       MULTIVITAMINS PO      Take 1 tablet by mouth daily.        nitroGLYcerin 0.4 MG sublingual tablet    NITROSTAT    25 tablet    Place 1 tablet (0.4 mg) under the tongue every 5 minutes as needed for chest pain    CAD (coronary  artery disease)       OMEGA 3 PO      Take  by mouth. 1 every two days        oxazepam 10 MG capsule    SERAX     Take 1 capsule by mouth daily.        PROLIA 60 MG/ML Soln injection   Generic drug:  denosumab      Inject 60 mg Subcutaneous once        ranitidine 150 MG tablet    ZANTAC     Take 1 tablet by mouth 2 times daily. In the morning and in the evening.

## 2018-05-16 NOTE — LETTER
5/16/2018       RE: Shannon Krishna  [address]   [facility] [address]     Dear Colleague,    Thank you for referring your patient, Shannon Krishna, to the [facility] EAR NOSE AND THROAT at [facility]. Please see a copy of my visit note below.    History of Present Illness:  Shannon Krishna is here for cerumen impaction.    Physical Examination:  The right ear was examined under the microscope.  There was noted to be a cerumen impaction.  It was cleaned with right angle hook, curette and alligator forceps.  The TM is otherwise clear.  The opposite ear was also examined under the microscope and noted to have a cerumen impaction.  It was cleaned with right angle hook, curette and alligator forceps.  The TM is otherwise clear.  The patient noted improvement of symptoms.    Plan:  Return as needed/scheduled in six months for cleaning.      SL/ms      Again, thank you for allowing me to participate in the care of your patient.      Sincerely,    Davon Lara MD

## 2018-05-16 NOTE — PATIENT INSTRUCTIONS
You will need  to schedule a follow up appointment in 6 months for an ear cleaning.   Please call our clinic for any questions,concerns,or worsening symptoms.      Clinic #[phone]       Option 3  for the triage nurse.

## 2018-05-16 NOTE — LETTER
5/16/2018      RE: Shannon Krishna  [address]   [facility] [address]       History of Present Illness:  Shannon Krishna is here for cerumen impaction.    Physical Examination:  The right ear was examined under the microscope.  There was noted to be a cerumen impaction.  It was cleaned with right angle hook, curette and alligator forceps.  The TM is otherwise clear.  The opposite ear was also examined under the microscope and noted to have a cerumen impaction.  It was cleaned with right angle hook, curette and alligator forceps.  The TM is otherwise clear.  The patient noted improvement of symptoms.    Plan:  Return as needed/scheduled in six months for cleaning.      SL/ms Davon Lara MD

## 2018-08-14 ENCOUNTER — NURSE TRIAGE (OUTPATIENT)
Dept: NURSING | Facility: CLINIC | Age: 78
End: 2018-08-14

## 2018-08-16 ENCOUNTER — NURSE TRIAGE (OUTPATIENT)
Dept: NURSING | Facility: CLINIC | Age: 78
End: 2018-08-16

## 2018-08-16 NOTE — TELEPHONE ENCOUNTER
Patient states she has had trouble with MyChart and is being told the problem is with her computer.  States she was notified on 8/14/18, that she has a new message in MyChart and asking what it is.  FNA explained unable to view MyChart.  Patient asking to speak with someone who can discontinue/cancel her MyChart.  Transferred to scheduling for assistance.

## 2018-08-26 NOTE — PROGRESS NOTES
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 She was treated with right lumpectomy, sentinel lymph node dissection which was negative for malignancy. This was performed July 2008. She was treated with adjuvant TCH Taxotere, carboplatin and Herceptin for six cycles ending December 2008. She completed a full year of Herceptin and is status post breast radiation therapy completed February 2009. She has been on Arimidex since March of 2009. She had fibrosis of the right breast surrounding skin erythema and lymphedema. Biopsy of the skin of the right breast performed 14 months ago showed no evidence of recurrence. Repeat biopsy performed 05/06/2011 showed skin with dermal chronic inflammation fibrosis, probable radiation changes and focal subcutaneous fat necrosis with no evidence of malignancy. The patient underwent head CT scan on Arelis 3 which showed thickening of the skin along the right breast, focal area of mild FTG activity within the right breast tissue which could be treatment related and an 8 x 5.8 lobulated mildly hypermetabolic lesion in continuity with vascular structures. Also of note was an area of FTG mild uptake along the chest wall under the right breast. A CT guided needle biopsy of this area was performed on 06/10/2011 and the biopsy pathology showed dense fibrosis consistent with scar, scant benign breast tissue, skeletal muscle and no evidence of malignancy. There was dermal chronic inflammation fibrosis, probable radiation changes and focal subcutaneous fat necrosis with no evidence of malignancy. The patient underwent head CT scan on Arelis 3 which showed thickening of the skin along the right breast, focal area of mild FTG activity within the right breast tissue which could be treatment related and an 8 x 5.8  lobulated mildly hypermetabolic lesion in continuity with vascular structures. Also of note was an area of FTG mild uptake along the chest wall under the right breast. A CT guided needle biopsy of this area was performed on 06/10/2011 and the biopsy pathology showed dense fibrosis consistent with scar, scant benign breast tissue, skeletal muscle and no evidence of malignancy.   SUMMARY OF DIAGNOSIS:  Stage I (T1 N0 M0) infiltrating ductal carcinoma of the right breast. She was originally diagnosed with a right ultrasound-guided biopsy in June of 2008. This revealed an infiltrating ductal carcinoma, Brijesh grade 2 of 3, ER-positive, DC-positive, HER2/alfredito 3+. She went on to receive a right lumpectomy with a sentinel lymph node dissection in July of 2008. Burton lymph nodes were negative for malignancy. She began adjuvant TCH (Taxotere, carboplatin, and Herceptin) and received 6 cycles of chemotherapy, completed in December of 2008. She then went on to complete a whole full year or Herceptin. She is status post breast radiation completed in February of 2009. She has been on Arimidex since March of 2009.    INTERVAL HISTORY:  Shannon returns to clinic for routine followup.  She has been feeling generally well.  She has no pain, no fatigue, no depression, no anxiety.  She does have severe aortic stenosis and has a followup appointment with Dr. Harvey in November for consideration of possible percutaneous aortic valve operation versus aortic valve replacement.  She has diminished hearing and has bilateral hearing aids which she does not use.      REVIEW OF SYSTEMS:  She denies fevers or chills, cough, chest pain, shortness of breath, nausea, vomiting, constipation, diarrhea, bone pain, back pain or headache.  The remainder of a 10-point review of systems is negative.      She has been eating a healthful diet.  She does exercise some.  She does not drink alcohol.  She takes vitamin D but not calcium.  Her most recent  DEXA scan in 2016 showed a most valid negative T-score of -2.4.  She has had 6 cycles of Zometa and in 1 year of Prolia.  She continues on anastrozole for 1 more year.  She has been tolerating the anastrozole quite well.      PHYSICAL EXAMINATION:   VITAL SIGNS:  Blood pressure 124/77, pulse 77, respirations 16, temperature 97.6, O2 sat 96% on room air, height 1.6 meters and weight 67 kg.   GENERAL:  Shannon appeared generally well.  She has no alopecia.   HEENT:  Oropharynx is without lesions.   LYMPH:  There is no palpable cervical, supraclavicular, subclavicular or axillary lymphadenopathy.   BREASTS:  Examination of the right breast reveals a well-healed incision at the 12 o'clock position of the nipple areolar complex.  Right nipple is everted.  There is some skin thickening and some post-radiation changes.  No masses in the right breast.  Right axillary incision is well healed without erythema or masses.  The left breast reveals an inverted nipple, no masses in the left breast.   LUNGS:  Clear to percussion and auscultation.   HEART:  There is a 2/6 systolic murmur at the left sternal border radiating to the apex.   ABDOMEN:  Soft and nontender, without hepatosplenomegaly.   EXTREMITIES:  Without edema.   PSYCHIATRIC:  Mood and affect were normal.  Pulse pressure range was slightly increased.      LABORATORY DATA:  None today.     IMAGING:  Mammogram 2-16-18 BIRADS2.        ASSESSMENT AND PLAN:    1. Shannon Krishna is a 77-year-old woman with a history of a stage I, T1a N0 M0, infiltrating ductal carcinoma of the right breast, which was HER-2 positive.  She had skin thickening at the time of diagnosis and some FDG uptake in the right breast, perhaps related to fibrosis and edema rather than tumor.  She had 2 skin biopsies that showed no evidence of recurrence of her breast cancer.  Skin thickening continues to improve and the erythema has largely diminished, although there is still a mottled appearance of the  right breast.  Mammography has shown benign results.  The plan is repeat her mammogram in 02/2018.   2.  Adjuvant hormonal therapy.  The plan is to continue 10 years of hormonal therapy.  She began aromatase inhibitor in 2009.  We will continue through 2019.  She has tolerated the Arimidex quite well without any joint stiffness.  She has had 6 cycles of Zometa and had Prolia for 1 year.  We decided to stop the Prolia in 08/2017 because of significant prior treatment with bone targeted agents.   3.  Osteoporosis.  The plan is to continue with calcium, vitamin D and weightbearing exercise.   4.  Exercise of 150 minutes per week was recommended.   5.  I did recommend calcium and vitamin D.  She takes vitamin D and drinks a lot of milk.   6.  Continued Cardiology followup for severe aortic stenosis.  She will follow up with Dr. Harvey for this problem.     7.  Followup.  She will follow up with me with our NAHUM in February and with me in 1 year. Cardiology referral for aortic stenosis.  Follow up with Lizzy Vance 2-28 with mammogram, CBC, CMP.    Thank you for allowing us to continue to participate in Shannon Krishna's care.     Sincerely,    Elias Caal M.D.      Division of Hematology, Oncology, Transplant   Department of Medicine   [employer] [facility] [employer]   [phone]     I spent 30 minutes with the patient more than 50% of which was in counseling and coordination of care.

## 2018-08-28 ENCOUNTER — ONCOLOGY VISIT (OUTPATIENT)
Dept: ONCOLOGY | Facility: CLINIC | Age: 78
End: 2018-08-28
Attending: INTERNAL MEDICINE
Payer: MEDICARE

## 2018-08-28 VITALS
OXYGEN SATURATION: 96 % | HEIGHT: 65 IN | SYSTOLIC BLOOD PRESSURE: 124 MMHG | BODY MASS INDEX: 24.79 KG/M2 | WEIGHT: 148.8 LBS | HEART RATE: 77 BPM | DIASTOLIC BLOOD PRESSURE: 77 MMHG | RESPIRATION RATE: 16 BRPM | TEMPERATURE: 97.6 F

## 2018-08-28 DIAGNOSIS — Z17.0 MALIGNANT NEOPLASM OF UPPER-OUTER QUADRANT OF RIGHT BREAST IN FEMALE, ESTROGEN RECEPTOR POSITIVE (H): Primary | ICD-10-CM

## 2018-08-28 DIAGNOSIS — C50.411 MALIGNANT NEOPLASM OF UPPER-OUTER QUADRANT OF RIGHT BREAST IN FEMALE, ESTROGEN RECEPTOR POSITIVE (H): Primary | ICD-10-CM

## 2018-08-28 PROCEDURE — G0463 HOSPITAL OUTPT CLINIC VISIT: HCPCS | Mod: ZF

## 2018-08-28 PROCEDURE — 99214 OFFICE O/P EST MOD 30 MIN: CPT | Mod: ZP | Performed by: INTERNAL MEDICINE

## 2018-08-28 ASSESSMENT — PAIN SCALES - GENERAL: PAINLEVEL: NO PAIN (0)

## 2018-08-28 NOTE — NURSING NOTE
"Oncology Rooming Note    August 28, 2018 2:07 PM   Shannon Krishna is a 77 year old female who presents for:    Chief Complaint   Patient presents with     Oncology Clinic Visit     return - breast ca      Initial Vitals: /77 (BP Location: Right arm, Patient Position: Chair, Cuff Size: Adult Regular)  Pulse 77  Temp 97.6  F (36.4  C) (Oral)  Resp 16  Ht 1.651 m (5' 5\")  Wt 67.5 kg (148 lb 12.8 oz)  SpO2 96%  BMI 24.76 kg/m2 Estimated body mass index is 24.76 kg/(m^2) as calculated from the following:    Height as of this encounter: 1.651 m (5' 5\").    Weight as of this encounter: 67.5 kg (148 lb 12.8 oz). Body surface area is 1.76 meters squared.  No Pain (0) Comment: Data Unavailable   No LMP recorded. Patient is postmenopausal.  Allergies reviewed: Yes  Medications reviewed: Yes    Medications: Medication refills not needed today.  Pharmacy name entered into [employer]: [facility] - [place], [place] - [address] [place] [address]    Clinical concerns: 1 year follow up      6 minutes for nursing intake (face to face time)     Viviana Farley CMA            "

## 2018-08-28 NOTE — MR AVS SNAPSHOT
After Visit Summary   8/28/2018    Shannon Krishna    MRN: [id number]           Patient Information     Date Of Birth          1940        Visit Information        Provider Department      8/28/2018 2:00 PM Elias Caal MD [facility]        Today's Diagnoses     Malignant neoplasm of upper-outer quadrant of right breast in female, estrogen receptor positive (H)    -  1       Follow-ups after your visit        Follow-up notes from your care team     Return in about 6 months (around 2/28/2019).      Your next 10 appointments already scheduled     Nov 06, 2018  9:30 AM CST   Ech Complete with [email or web address]   [facility] Echo ([facility])    [address]  3rd Floor  [place] [id number] [phone]           1.  Please bring or wear a comfortable two-piece outfit. 2.  You may eat, drink and take your normal medicines. 3.  For any questions that cannot be answered, please contact the ordering physician 4.  Please do not wear perfumes or scented lotions on the day of your exam.            Nov 06, 2018 10:30 AM CST   (Arrive by 10:15 AM)   Return Visit with NURYS Shin MD   [facility] Heart Care ([facility])    [address]  [address]  [place] [address] [phone]            Nov 16, 2018 10:30 AM CST   (Arrive by 10:15 AM)   Return Visit with Davon Lara MD   [facility] Ear Nose and Throat ([facility])    [address]  4th Floor  [place] [address] [phone]            Feb 28, 2019  1:00 PM CST   [facility] Lab Draw with  [facility] LAB DRAW   [facility] Lab Draw ([facility])    [address] [place] [address]  [address]  [place] [address] [phone]            Feb 28, 2019  1:30 PM CST   MA DIAGNOSTIC DIGITAL BILATERAL with [email or web address]   [facility] Breast [facility] ([facility])    [address] 2nd [address]  [facility] [id number] [phone]           Do not use any powder, lotion or deodorant under your arms or on your breast. If you do, we will ask you to remove it before your exam.  Wear comfortable, two-piece clothing.  If you have any allergies, tell your care team.  Bring any previous mammograms from other facilities or have them mailed to the breast center.  Three-dimensional (3D) mammograms are available at [facility] locations in [facility], [facility], and [place]. [facility] locations include [place] and [facility] in [place]. Benefits of 3D mammograms include: - Improved rate of cancer detection - Decreases your chance of having to go back for more tests, which means fewer: - \"False-positive\" results (This means that there is an abnormal area but it isn't cancer.) - Invasive testing procedures, such as a biopsy or surgery - Can provide clearer images of the breast if you have dense breast tissue. 3D mammography is an optional exam that anyone can have with a 2D mammogram. It doesn't replace or take the place of a 2D mammogram. 2D mammograms remain an effective screening test for all women.  Not all insurance companies cover the cost of a 3D mammogram. Check with your insurance.            Feb 28, 2019  2:00 PM CST   (Arrive by 1:45 PM)   Return Visit with HALLEY King   [facility] ([facility])    [address]  [address]  [facility] [address] [phone]              Who to contact     If you have questions or need follow up information about today's clinic visit or your schedule please contact [facility] directly at [phone].  Normal or non-critical lab and imaging results will be communicated to you by MyChart, letter or phone within 4 business days after the clinic has received the results. If you do not hear " "from us within 7 days, please contact the clinic through [employer] or phone. If you have a critical or abnormal lab result, we will notify you by phone as soon as possible.  Submit refill requests through [employer] or call your pharmacy and they will forward the refill request to us. Please allow 3 business days for your refill to be completed.          Additional Information About Your Visit        [employer] Information     [employer] gives you secure access to your electronic health record. If you see a primary care provider, you can also send messages to your care team and make appointments. If you have questions, please call your primary care clinic.  If you do not have a primary care provider, please call [phone] and they will assist you.        Care EveryWhere ID     This is your Care EveryWhere ID. This could be used by other organizations to access your [facility] medical records  [id number]        Your Vitals Were     Pulse Temperature Respirations Height Pulse Oximetry BMI (Body Mass Index)    77 97.6  F (36.4  C) (Oral) 16 1.651 m (5' 5\") 96% 24.76 kg/m2       Blood Pressure from Last 3 Encounters:   08/28/18 124/77   05/01/18 132/68   02/16/18 120/68    Weight from Last 3 Encounters:   08/28/18 67.5 kg (148 lb 12.8 oz)   05/01/18 69.2 kg (152 lb 9.6 oz)   02/16/18 72.6 kg (160 lb)               Primary Care Provider Office Phone # Fax #    Valeria NAVARRETE [facility] [phone]       [address] [facility] [address]        Equal Access to Services     GABRIELLA REGALADO : Hadii brigitte de la garza hadasho Sow[place], waaxda luqadaha, qaybta kaalmada lul cantu. So [facility] [phone].    ATENCIÓN: Si habla español, tiene a ang disposición servicios gratuitos de asistencia lingüística. Lindaame al [phone].    We comply with applicable federal civil rights laws and [place] laws. We do not discriminate on the basis of race, color, " national origin, age, disability, sex, sexual orientation, or gender identity.            Thank you!     Thank you for choosing [facility]  for your care. Our goal is always to provide you with excellent care. Hearing back from our patients is one way we can continue to improve our services. Please take a few minutes to complete the written survey that you may receive in the mail after your visit with us. Thank you!             Your Updated Medication List - Protect others around you: Learn how to safely use, store and throw away your medicines at www.disposemymeds.org.          This list is accurate as of 8/28/18 11:59 PM.  Always use your most recent med list.                   Brand Name Dispense Instructions for use Diagnosis    amitriptyline 25 MG tablet    ELAVIL     Take 1-2 tablets by mouth At Bedtime.        anastrozole 1 MG tablet    ARIMIDEX    90 tablet    Take 1 tablet (1 mg) by mouth daily    Breast cancer of upper-outer quadrant of right female breast (H)       aspirin 81 MG EC tablet      Take 1 tablet by mouth daily.    CAD (coronary artery disease)       diclofenac 1 % Gel topical gel    VOLTAREN    100 g    Apply 4 grams to knees or 2 grams to hands four times daily using enclosed dosing card.    Primary osteoarthritis of right knee       isosorbide mononitrate 60 MG 24 hr tablet    IMDUR    91 tablet    Take 1 tablet (60 mg) by mouth daily    CAD (coronary artery disease)       LIPITOR 40 MG tablet   Generic drug:  atorvastatin      Take 40 mg by mouth daily.        lisinopril 10 MG tablet    PRINIVIL/ZESTRIL     Take 1 tablet by mouth daily 10 mg total        metoprolol succinate 25 MG 24 hr tablet    TOPROL-XL    90 tablet    Take 1 tablet (25 mg) by mouth daily    CAD (coronary artery disease)       MULTIVITAMINS PO      Take 1 tablet by mouth daily.        nitroGLYcerin 0.4 MG sublingual tablet    NITROSTAT    25 tablet    Place 1 tablet (0.4 mg) under the tongue every 5  minutes as needed for chest pain    CAD (coronary artery disease)       OMEGA 3 PO      Take  by mouth. 1 every two days        oxazepam 10 MG capsule    SERAX     Take 1 capsule by mouth daily.        PROLIA 60 MG/ML Soln injection   Generic drug:  denosumab      Inject 60 mg Subcutaneous once        ranitidine 150 MG tablet    ZANTAC     Take 1 tablet by mouth 2 times daily. In the morning and in the evening.

## 2018-08-28 NOTE — LETTER
8/28/2018       RE: Shannon Krishna  [address]  [facility] [address]     Dear Colleague,    Thank you for referring your patient, Shannon Krishna, to the [facility]. Please see a copy of my visit note below.    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 She was treated with right lumpectomy, sentinel lymph node dissection which was negative for malignancy. This was performed July 2008. She was treated with adjuvant H Taxotere, carboplatin and Herceptin for six cycles ending December 2008. She completed a full year of Herceptin and is status post breast radiation therapy completed February 2009. She has been on Arimidex since March of 2009. She had fibrosis of the right breast surrounding skin erythema and lymphedema. Biopsy of the skin of the right breast performed 14 months ago showed no evidence of recurrence. Repeat biopsy performed 05/06/2011 showed skin with dermal chronic inflammation fibrosis, probable radiation changes and focal subcutaneous fat necrosis with no evidence of malignancy. The patient underwent head CT scan on Arelis 3 which showed thickening of the skin along the right breast, focal area of mild FTG activity within the right breast tissue which could be treatment related and an 8 x 5.8 lobulated mildly hypermetabolic lesion in continuity with vascular structures. Also of note was an area of FTG mild uptake along the chest wall under the right breast. A CT guided needle biopsy of this area was performed on 06/10/2011 and the biopsy pathology showed dense fibrosis consistent with scar, scant benign breast tissue, skeletal muscle and no evidence of malignancy. There was dermal chronic inflammation fibrosis, probable radiation changes and focal subcutaneous fat  necrosis with no evidence of malignancy. The patient underwent head CT scan on Arelis 3 which showed thickening of the skin along the right breast, focal area of mild FTG activity within the right breast tissue which could be treatment related and an 8 x 5.8 lobulated mildly hypermetabolic lesion in continuity with vascular structures. Also of note was an area of FTG mild uptake along the chest wall under the right breast. A CT guided needle biopsy of this area was performed on 06/10/2011 and the biopsy pathology showed dense fibrosis consistent with scar, scant benign breast tissue, skeletal muscle and no evidence of malignancy.   SUMMARY OF DIAGNOSIS:  Stage I (T1 N0 M0) infiltrating ductal carcinoma of the right breast. She was originally diagnosed with a right ultrasound-guided biopsy in June of 2008. This revealed an infiltrating ductal carcinoma, Brijesh grade 2 of 3, ER-positive, CO-positive, HER2/alfredito 3+. She went on to receive a right lumpectomy with a sentinel lymph node dissection in July of 2008. [place] lymph nodes were negative for malignancy. She began adjuvant TCH (Taxotere, carboplatin, and Herceptin) and received 6 cycles of chemotherapy, completed in December of 2008. She then went on to complete a whole full year or Herceptin. She is status post breast radiation completed in February of 2009. She has been on Arimidex since March of 2009.    INTERVAL HISTORY:  Shannon returns to clinic for routine followup.  She has been feeling generally well.  She has no pain, no fatigue, no depression, no anxiety.  She does have severe aortic stenosis and has a followup appointment with Dr. Harvey in November for consideration of possible percutaneous aortic valve operation versus aortic valve replacement.  She has diminished hearing and has bilateral hearing aids which she does not use.      REVIEW OF SYSTEMS:  She denies fevers or chills, cough, chest pain, shortness of breath, nausea, vomiting, constipation,  diarrhea, bone pain, back pain or headache.  The remainder of a 10-point review of systems is negative.      She has been eating a healthful diet.  She does exercise some.  She does not drink alcohol.  She takes vitamin D but not calcium.  Her most recent DEXA scan in 2016 showed a most valid negative T-score of -2.4.  She has had 6 cycles of Zometa and in 1 year of Prolia.  She continues on anastrozole for 1 more year.  She has been tolerating the anastrozole quite well.      PHYSICAL EXAMINATION:   VITAL SIGNS:  Blood pressure 124/77, pulse 77, respirations 16, temperature 97.6, O2 sat 96% on room air, height 1.6 meters and weight 67 kg.   GENERAL:  Shannon appeared generally well.  She has no alopecia.   HEENT:  Oropharynx is without lesions.   LYMPH:  There is no palpable cervical, supraclavicular, subclavicular or axillary lymphadenopathy.   BREASTS:  Examination of the right breast reveals a well-healed incision at the 12 o'clock position of the nipple areolar complex.  Right nipple is everted.  There is some skin thickening and some post-radiation changes.  No masses in the right breast.  Right axillary incision is well healed without erythema or masses.  The left breast reveals an inverted nipple, no masses in the left breast.   LUNGS:  Clear to percussion and auscultation.   HEART:  There is a 2/6 systolic murmur at the left sternal border radiating to the apex.   ABDOMEN:  Soft and nontender, without hepatosplenomegaly.   EXTREMITIES:  Without edema.   PSYCHIATRIC:  Mood and affect were normal.  Pulse pressure range was slightly increased.      LABORATORY DATA:  None today.     IMAGING:  Mammogram 2-16-18 BIRADS2.        ASSESSMENT AND PLAN:    1. Shannon Krishna is a 77-year-old woman with a history of a stage I, T1a N0 M0, infiltrating ductal carcinoma of the right breast, which was HER-2 positive.  She had skin thickening at the time of diagnosis and some FDG uptake in the right breast, perhaps related to  fibrosis and edema rather than tumor.  She had 2 skin biopsies that showed no evidence of recurrence of her breast cancer.  Skin thickening continues to improve and the erythema has largely diminished, although there is still a mottled appearance of the right breast.  Mammography has shown benign results.  The plan is repeat her mammogram in 02/2018.   2.  Adjuvant hormonal therapy.  The plan is to continue 10 years of hormonal therapy.  She began aromatase inhibitor in 2009.  We will continue through 2019.  She has tolerated the Arimidex quite well without any joint stiffness.  She has had 6 cycles of Zometa and had Prolia for 1 year.  We decided to stop the Prolia in 08/2017 because of significant prior treatment with bone targeted agents.   3.  Osteoporosis.  The plan is to continue with calcium, vitamin D and weightbearing exercise.   4.  Exercise of 150 minutes per week was recommended.   5.  I did recommend calcium and vitamin D.  She takes vitamin D and drinks a lot of milk.   6.  Continued Cardiology followup for severe aortic stenosis.  She will follow up with Dr. Harvey for this problem.     7.  Followup.  She will follow up with me with our NAHUM in February and with me in 1 year. Cardiology referral for aortic stenosis.  Follow up with Lizzy Vance 2-28 with mammogram, CBC, CMP.    Thank you for allowing us to continue to participate in Shannon Krishna's care.     Sincerely,    Elias Caal M.D.      Division of Hematology, Oncology, Transplant   Department of Medicine   [employer]   [phone]     I spent 30 minutes with the patient more than 50% of which was in counseling and coordination of care.     Again, thank you for allowing me to participate in the care of your patient.      Sincerely,    Elias Caal MD

## 2018-11-16 ENCOUNTER — OFFICE VISIT (OUTPATIENT)
Dept: OTOLARYNGOLOGY | Facility: CLINIC | Age: 78
End: 2018-11-16
Payer: MEDICARE

## 2018-11-16 VITALS — HEIGHT: 65 IN | WEIGHT: 152 LBS | BODY MASS INDEX: 25.33 KG/M2

## 2018-11-16 DIAGNOSIS — H61.23 BILATERAL IMPACTED CERUMEN: Primary | ICD-10-CM

## 2018-11-16 ASSESSMENT — PAIN SCALES - GENERAL: PAINLEVEL: NO PAIN (0)

## 2018-11-16 NOTE — LETTER
11/16/2018      RE: Shannon Krishna  [address]  [facility] [address]       History of Present Illness: This is a 77-year-old patient whom I been cleaning over a long period of time for cerumen impactions.    Physical Examination: Exam under the microscope shows that both ears have cerumen impactions that are relatively easy to remove with a right angle hook, curette and alligator forceps.  The drums are otherwise healthy and the ear canals are clean.    Plan: I will see her back in about six months.      SL/ms Davon Lara MD

## 2018-11-16 NOTE — MR AVS SNAPSHOT
After Visit Summary   11/16/2018    Shannon Krishna    MRN: [id number]           Patient Information     Date Of Birth          1940        Visit Information        Provider Department      11/16/2018 10:30 AM Davon Lara MD [facility] Nose [facility] Th[facility]         Follow-ups after your visit        Your next 10 appointments already scheduled     Feb 28, 2019  1:00 PM GRIFFIN   [facility] Lab Draw with  [facility] LAB DRAW   [facility] Lab Draw ([facility])    [address]  [address]  [facility] [id number] [phone]            Feb 28, 2019  1:30 PM GRIFFIN   MA DIAGNOSTIC DIGITAL BILATERAL with [id number]   [facility] ([facility])    [address]nd [address]  [facility] [id number] [phone]           How do I prepare for my exam? (Food and drink instructions) No Food and Drink Restrictions.  How do I prepare for my exam? (Other instructions) Do not use any powder, lotion or deodorant under your arms or on your breast. If you do, we will ask you to remove it before your exam.  What should I wear: Wear comfortable, two-piece clothing.  How long does the exam take: Most scans will take 15 minutes.  What should I bring: Bring any previous mammograms from other facilities or have them mailed to the breast center.  Do I need a :  No  is needed.  What do I need to tell my doctor: If you have any allergies, tell your care team.  What should I do after the exam: No restrictions, You may resume normal activities.  What is this test: This test is an x-ray of the breast to look for breast disease. The breast is pressed between two plates to flatten and spread the tissue. An X-ray is taken of the breast from different angles.  Who should I call with questions: If you have any questions, please call the Imaging Department where you will have your exam. Directions, parking instructions, and other  "information is available on our website, [email or web address].  Other information about my exam Three-dimensional (3D) mammograms are available at [facility] locations in [facility], [facility], [facility], [place], and [place]. [facility] locations include [place] and [facility] in [place].  Benefits of 3D mammograms include: * Improved rate of cancer detection * Decreases your chance of having to go back for more tests, which means fewer: * \"False-positive\" results (This means that there is an abnormal area but it isn't cancer.) * Invasive testing procedures, such as a biopsy or surgery * Can provide clearer images of the breast if you have dense breast tissue.  *3D mammography is an optional exam that anyone can have with a 2D mammogram. It doesn't replace or take the place of a 2D mammogram. 2D mammograms remain an effective screening test for all women.  Not all insurance companies cover the cost of a 3D mammogram. Check with your insurance.            Feb 28, 2019  2:00 PM CST   (Arrive by 1:45 PM)   Return Visit with HALLEY King   [facility] ([facility])    [address] [place] [address]  [address] [place] [address] [phone]            May 21, 2019 10:30 AM CDT   (Arrive by 10:15 AM)   New Patient Visit with Rick L Nissen, MD   [facility] ([facility])    [address] [place] [address]  4th Floor  [facility] [address] [phone]              Who to contact     Please call your clinic at [phone] to:    Ask questions about your health    Make or cancel appointments    Discuss your medicines    Learn about your test results    Speak to your doctor            Additional Information About Your Visit        MyChart Information     retickrt gives you secure access to your electronic health record. If you see a primary care provider, you can also send messages to your " "care team and make appointments. If you have questions, please call your primary care clinic.  If you do not have a primary care provider, please call [phone] and they will assist you.      [employer] is an electronic gateway that provides easy, online access to your medical records. With [employer], you can request a clinic appointment, read your test results, renew a prescription or communicate with your care team.     To access your existing account, please contact your [facility] or call [phone] for assistance.        Care EveryWhere ID     This is your Care EveryWhere ID. This could be used by other organizations to access your [facility] medical records  [id number]        Your Vitals Were     Height BMI (Body Mass Index)                1.651 m (5' 5\") 25.29 kg/m2           Blood Pressure from Last 3 Encounters:   08/28/18 124/77   05/01/18 132/68   02/16/18 120/68    Weight from Last 3 Encounters:   11/16/18 68.9 kg (152 lb)   08/28/18 67.5 kg (148 lb 12.8 oz)   05/01/18 69.2 kg (152 lb 9.6 oz)              Today, you had the following     No orders found for display       Primary Care Provider Office Phone # Fax #    Valeria NAVARRETE [facility] [phone]       [address] [facility] [address]        Equal Access to Services     PEGGY REGALADO : Hadii brigitte ku hadasho Soomaali, waaxda luqadaha, qaybta kaalmada adeegyada, lul collins . So [facility] [phone].    ATENCIÓN: Si habla español, tiene a ang disposición servicios gratuitos de asistencia lingüística. Llame al [phone].    We comply with applicable federal civil rights laws and [place] laws. We do not discriminate on the basis of race, color, national origin, age, disability, sex, sexual orientation, or gender identity.            Thank you!     Thank you for choosing [facility] E[facility]  for your care. Our goal is always to " provide you with excellent care. Hearing back from our patients is one way we can continue to improve our services. Please take a few minutes to complete the written survey that you may receive in the mail after your visit with us. Thank you!             Your Updated Medication List - Protect others around you: Learn how to safely use, store and throw away your medicines at www.disposemymeds.org.          This list is accurate as of 11/16/18 10:43 AM.  Always use your most recent med list.                   Brand Name Dispense Instructions for use Diagnosis    amitriptyline 25 MG tablet    ELAVIL     Take 1-2 tablets by mouth At Bedtime.        anastrozole 1 MG tablet    ARIMIDEX    90 tablet    Take 1 tablet (1 mg) by mouth daily    Breast cancer of upper-outer quadrant of right female breast (H)       aspirin 81 MG EC tablet      Take 1 tablet by mouth daily.    CAD (coronary artery disease)       diclofenac 1 % Gel topical gel    VOLTAREN    100 g    Apply 4 grams to knees or 2 grams to hands four times daily using enclosed dosing card.    Primary osteoarthritis of right knee       isosorbide mononitrate 60 MG 24 hr tablet    IMDUR    91 tablet    Take 1 tablet (60 mg) by mouth daily    CAD (coronary artery disease)       LIPITOR 40 MG tablet   Generic drug:  atorvastatin      Take 40 mg by mouth daily.        lisinopril 10 MG tablet    PRINIVIL/ZESTRIL     Take 1 tablet by mouth daily 10 mg total        metoprolol succinate 25 MG 24 hr tablet    TOPROL-XL    90 tablet    Take 1 tablet (25 mg) by mouth daily    CAD (coronary artery disease)       MULTIVITAMINS PO      Take 1 tablet by mouth daily.        nitroGLYcerin 0.4 MG sublingual tablet    NITROSTAT    25 tablet    Place 1 tablet (0.4 mg) under the tongue every 5 minutes as needed for chest pain    CAD (coronary artery disease)       OMEGA 3 PO      Take  by mouth. 1 every two days        oxazepam 10 MG capsule    SERAX     Take 1 capsule by mouth daily.         PROLIA 60 MG/ML Soln injection   Generic drug:  denosumab      Inject 60 mg Subcutaneous once        ranitidine 150 MG tablet    ZANTAC     Take 1 tablet by mouth 2 times daily. In the morning and in the evening.

## 2018-11-16 NOTE — LETTER
11/16/2018       RE: Shannon Krishna  [address]  [facility] [address]     Dear Colleague,    Thank you for referring your patient, Shannon Krishna, to the [facility] at [facility]. Please see a copy of my visit note below.    History of Present Illness: This is a 77-year-old patient whom I been cleaning over a long period of time for cerumen impactions.    Physical Examination: Exam under the microscope shows that both ears have cerumen impactions that are relatively easy to remove with a right angle hook, curette and alligator forceps.  The drums are otherwise healthy and the ear canals are clean.    Plan: I will see her back in about six months.      SL/ms    Again, thank you for allowing me to participate in the care of your patient.      Sincerely,    Davon Lara MD

## 2018-11-16 NOTE — PROGRESS NOTES
History of Present Illness: This is a 77-year-old patient whom I been cleaning over a long period of time for cerumen impactions.    Physical Examination: Exam under the microscope shows that both ears have cerumen impactions that are relatively easy to remove with a right angle hook, curette and alligator forceps.  The drums are otherwise healthy and the ear canals are clean.    Plan: I will see her back in about six months.      SL/ms

## 2018-11-16 NOTE — NURSING NOTE
"Chief Complaint   Patient presents with     RECHECK     Return 6 month ear cleaning. No pain or drainage today.        Ht 1.651 m (5' 5\")  Wt 68.9 kg (152 lb)  BMI 25.29 kg/m2    JOSH Howell LPN    "

## 2018-11-16 NOTE — PATIENT INSTRUCTIONS
You will need  to schedule a follow up appointment in 6 months.        Please call our clinic for any questions,concerns,or worsening symptoms.      Clinic #[phone]       Option 3  for the ENT Care Team.       Option 1 for scheduling.    Jacque YANEZ RNMARTHA  [phone]

## 2019-01-02 DIAGNOSIS — I25.10 CAD (CORONARY ARTERY DISEASE): ICD-10-CM

## 2019-01-02 RX ORDER — ISOSORBIDE MONONITRATE 60 MG/1
60 TABLET, EXTENDED RELEASE ORAL DAILY
Qty: 60 TABLET | Refills: 0 | Status: SHIPPED | OUTPATIENT
Start: 2019-01-02 | End: 2019-02-26

## 2019-02-12 ENCOUNTER — TRANSFERRED RECORDS (OUTPATIENT)
Dept: HEALTH INFORMATION MANAGEMENT | Facility: CLINIC | Age: 79
End: 2019-02-12

## 2019-02-15 DIAGNOSIS — I25.10 CAD (CORONARY ARTERY DISEASE): ICD-10-CM

## 2019-02-15 RX ORDER — NITROGLYCERIN 0.4 MG/1
0.4 TABLET SUBLINGUAL EVERY 5 MIN PRN
Qty: 25 TABLET | Refills: 1 | Status: SHIPPED | OUTPATIENT
Start: 2019-02-15

## 2019-02-26 ENCOUNTER — TELEPHONE (OUTPATIENT)
Dept: CARDIOLOGY | Facility: CLINIC | Age: 79
End: 2019-02-26

## 2019-02-26 DIAGNOSIS — I25.10 CAD (CORONARY ARTERY DISEASE): ICD-10-CM

## 2019-02-26 RX ORDER — ISOSORBIDE MONONITRATE 60 MG/1
60 TABLET, EXTENDED RELEASE ORAL DAILY
Qty: 90 TABLET | Refills: 0 | Status: SHIPPED | OUTPATIENT
Start: 2019-02-26 | End: 2019-05-26

## 2019-02-26 NOTE — TELEPHONE ENCOUNTER
Spoke to patient, clarified that she is going to see Dr. Harvey in May. I will send another refill to get her through until then.

## 2019-02-26 NOTE — TELEPHONE ENCOUNTER
[facility] Call Center    Phone Message    May a detailed message be left on voicemail: yes    Reason for Call: Other: Pt requesting call back with some questions on script of isosorbide mononitrate      Action Taken: Message routed to:  [facility] cardio

## 2019-02-28 ENCOUNTER — ONCOLOGY VISIT (OUTPATIENT)
Dept: ONCOLOGY | Facility: CLINIC | Age: 79
End: 2019-02-28
Attending: PHYSICIAN ASSISTANT
Payer: MEDICARE

## 2019-02-28 ENCOUNTER — APPOINTMENT (OUTPATIENT)
Dept: LAB | Facility: CLINIC | Age: 79
End: 2019-02-28
Attending: INTERNAL MEDICINE
Payer: MEDICARE

## 2019-02-28 ENCOUNTER — ANCILLARY PROCEDURE (OUTPATIENT)
Dept: MAMMOGRAPHY | Facility: CLINIC | Age: 79
End: 2019-02-28
Payer: MEDICARE

## 2019-02-28 VITALS
DIASTOLIC BLOOD PRESSURE: 66 MMHG | OXYGEN SATURATION: 98 % | WEIGHT: 154.7 LBS | SYSTOLIC BLOOD PRESSURE: 120 MMHG | HEIGHT: 65 IN | RESPIRATION RATE: 16 BRPM | TEMPERATURE: 98.8 F | HEART RATE: 91 BPM | BODY MASS INDEX: 25.77 KG/M2

## 2019-02-28 DIAGNOSIS — C50.411 MALIGNANT NEOPLASM OF UPPER-OUTER QUADRANT OF RIGHT BREAST IN FEMALE, ESTROGEN RECEPTOR POSITIVE (H): ICD-10-CM

## 2019-02-28 DIAGNOSIS — Z17.0 MALIGNANT NEOPLASM OF UPPER-OUTER QUADRANT OF RIGHT BREAST IN FEMALE, ESTROGEN RECEPTOR POSITIVE (H): ICD-10-CM

## 2019-02-28 DIAGNOSIS — M81.0 OSTEOPOROSIS, UNSPECIFIED OSTEOPOROSIS TYPE, UNSPECIFIED PATHOLOGICAL FRACTURE PRESENCE: Primary | ICD-10-CM

## 2019-02-28 LAB
ALBUMIN SERPL-MCNC: 3.5 G/DL (ref 3.4–5)
ALP SERPL-CCNC: 80 U/L (ref 40–150)
ALT SERPL W P-5'-P-CCNC: 26 U/L (ref 0–50)
ANION GAP SERPL CALCULATED.3IONS-SCNC: 10 MMOL/L (ref 3–14)
AST SERPL W P-5'-P-CCNC: 24 U/L (ref 0–45)
BASOPHILS # BLD AUTO: 0 10E9/L (ref 0–0.2)
BASOPHILS NFR BLD AUTO: 0.6 %
BILIRUB SERPL-MCNC: 0.4 MG/DL (ref 0.2–1.3)
BUN SERPL-MCNC: 16 MG/DL (ref 7–30)
CALCIUM SERPL-MCNC: 8.6 MG/DL (ref 8.5–10.1)
CHLORIDE SERPL-SCNC: 102 MMOL/L (ref 94–109)
CO2 SERPL-SCNC: 21 MMOL/L (ref 20–32)
CREAT SERPL-MCNC: 0.63 MG/DL (ref 0.52–1.04)
DIFFERENTIAL METHOD BLD: NORMAL
EOSINOPHIL # BLD AUTO: 0.1 10E9/L (ref 0–0.7)
EOSINOPHIL NFR BLD AUTO: 1.9 %
ERYTHROCYTE [DISTWIDTH] IN BLOOD BY AUTOMATED COUNT: 14.3 % (ref 10–15)
GFR SERPL CREATININE-BSD FRML MDRD: 86 ML/MIN/{1.73_M2}
GLUCOSE SERPL-MCNC: 110 MG/DL (ref 70–99)
HCT VFR BLD AUTO: 40.4 % (ref 35–47)
HGB BLD-MCNC: 13 G/DL (ref 11.7–15.7)
IMM GRANULOCYTES # BLD: 0.1 10E9/L (ref 0–0.4)
IMM GRANULOCYTES NFR BLD: 0.9 %
LYMPHOCYTES # BLD AUTO: 1.6 10E9/L (ref 0.8–5.3)
LYMPHOCYTES NFR BLD AUTO: 24 %
MCH RBC QN AUTO: 28.3 PG (ref 26.5–33)
MCHC RBC AUTO-ENTMCNC: 32.2 G/DL (ref 31.5–36.5)
MCV RBC AUTO: 88 FL (ref 78–100)
MONOCYTES # BLD AUTO: 0.4 10E9/L (ref 0–1.3)
MONOCYTES NFR BLD AUTO: 6.5 %
NEUTROPHILS # BLD AUTO: 4.3 10E9/L (ref 1.6–8.3)
NEUTROPHILS NFR BLD AUTO: 66.1 %
NRBC # BLD AUTO: 0 10*3/UL
NRBC BLD AUTO-RTO: 0 /100
PLATELET # BLD AUTO: 224 10E9/L (ref 150–450)
POTASSIUM SERPL-SCNC: 4 MMOL/L (ref 3.4–5.3)
PROT SERPL-MCNC: 6.8 G/DL (ref 6.8–8.8)
RBC # BLD AUTO: 4.59 10E12/L (ref 3.8–5.2)
SODIUM SERPL-SCNC: 134 MMOL/L (ref 133–144)
WBC # BLD AUTO: 6.5 10E9/L (ref 4–11)

## 2019-02-28 PROCEDURE — 80053 COMPREHEN METABOLIC PANEL: CPT | Performed by: INTERNAL MEDICINE

## 2019-02-28 PROCEDURE — 36415 COLL VENOUS BLD VENIPUNCTURE: CPT

## 2019-02-28 PROCEDURE — G0463 HOSPITAL OUTPT CLINIC VISIT: HCPCS | Mod: ZF

## 2019-02-28 PROCEDURE — 99214 OFFICE O/P EST MOD 30 MIN: CPT | Mod: ZP | Performed by: PHYSICIAN ASSISTANT

## 2019-02-28 PROCEDURE — 85025 COMPLETE CBC W/AUTO DIFF WBC: CPT | Performed by: INTERNAL MEDICINE

## 2019-02-28 ASSESSMENT — MIFFLIN-ST. JEOR: SCORE: 1187.59

## 2019-02-28 ASSESSMENT — PAIN SCALES - GENERAL: PAINLEVEL: NO PAIN (0)

## 2019-02-28 NOTE — NURSING NOTE
Chief Complaint   Patient presents with     Blood Draw     Labs drawn via  by RN in lab. VS taken.      Labs drawn via venipuncture. Vital signs taken. Checked into next appointment.     Sera Jefferson RN

## 2019-02-28 NOTE — PROGRESS NOTES
Oncology/Hematology Visit Note  Feb 28, 2019    DIAGNOSIS:  Stage I (T1 N0 M0) infiltrating ductal carcinoma of the right breast. She was originally diagnosed with a right ultrasound-guided biopsy in June of 2008. This revealed an infiltrating ductal carcinoma, Brijesh grade 2 of 3, ER-positive, [place]-positive, HER2/alfredito 3+. She went on to receive a right lumpectomy with a sentinel lymph node dissection in July of 2008. [place] lymph nodes were negative for malignancy. She began adjuvant TCH (Taxotere, carboplatin, and Herceptin) and received 6 cycles of chemotherapy, completed in December of 2008. She then went on to complete a whole full year or Herceptin. She is status post breast radiation completed in February of 2009. She has been on Arimidex since March of 2009.     Interval History:  Shannon returns to clinic for routine followup.  She has been feeling generally well.  She has no pain, no fatigue, no depression, no anxiety.  She does have severe aortic stenosis. Saw cardiologist at [place] who recommended cardiac rehab and referred her to here for that. She has not yet had a call to schedule, but has a number to call if needed. She is going to see cardiologist at [facility] again in 6 months and Dr. Harvey here in May. She denies any dizziness, dyspnea. She has taken nitroglycerin in the past for chest tightness, but has not needed any for about 1-2 months.    She is on Arimidex wihtout any noticeable side effects. She does have arthritis in her knees, worse on right. Wears ace bandage when walking. No other joint aches. No new bone pain. Denies headaches, neuropathy.     She has history of gastric ulcers and is taking ranitidine. She has osteoporosis but does not like to take calcium supplements. She drinks a lot of milk. Her PCP is Valeria Reynolds MD at [facility] in [place]. She had vaginal bleeding about 1 year ago. This was evaluated with US. This has not recurred.     She has been eating a  healthful diet.  She does some walking for exercise.  She does not drink alcohol.  Her most recent DEXA scan in 2016 showed a most valid negative T-score of -2.4.  She has had 6 cycles of Zometa and in 1 year of Prolia.  Prolia stopped in 8/2017 because of significant prior treatment with bone targeted agents per chart. She denies any falls or bone fractures.    Current Outpatient Medications   Medication Sig Dispense Refill     amitriptyline (ELAVIL) 25 MG tablet Take 1-2 tablets by mouth At Bedtime.       anastrozole (ARIMIDEX) 1 MG tablet Take 1 tablet (1 mg) by mouth daily 90 tablet 3     aspirin EC 81 MG EC tablet Take 1 tablet by mouth daily.       atorvastatin (LIPITOR) 40 MG tablet Take 40 mg by mouth daily.       denosumab (PROLIA) 60 MG/ML SOLN injection Inject 60 mg Subcutaneous once       diclofenac (VOLTAREN) 1 % GEL topical gel Apply 4 grams to knees or 2 grams to hands four times daily using enclosed dosing card. 100 g 1     isosorbide mononitrate (IMDUR) 60 MG 24 hr tablet Take 1 tablet (60 mg) by mouth daily 90 tablet 0     lisinopril (PRINIVIL,ZESTRIL) 10 MG tablet Take 1 tablet by mouth daily 10 mg total       metoprolol succinate (TOPROL-XL) 25 MG 24 hr tablet Take 1 tablet (25 mg) by mouth daily 90 tablet 3     Multiple Vitamin (MULTIVITAMINS PO) Take 1 tablet by mouth daily.       nitroGLYcerin (NITROSTAT) 0.4 MG sublingual tablet Place 1 tablet (0.4 mg) under the tongue every 5 minutes as needed for chest pain 25 tablet 1     Omega-3 Fatty Acids (OMEGA 3 PO) Take  by mouth. 1 every two days       oxazepam (SERAX) 10 MG capsule Take 1 capsule by mouth daily.       ranitidine (ZANTAC) 150 MG tablet Take 1 tablet by mouth 2 times daily. In the morning and in the evening.          Physical Examination:  General: The patient is a pleasant female in no acute distress.  /66 (BP Location: Right arm, Patient Position: Sitting, Cuff Size: Adult Regular)   Pulse 91   Temp 98.8  F (37.1  C) (Oral)  "  Resp 16   Ht 1.651 m (5' 5\")   Wt 70.2 kg (154 lb 11.2 oz)   SpO2 98%   BMI 25.74 kg/m    Wt Readings from Last 10 Encounters:   02/28/19 70.2 kg (154 lb 11.2 oz)   11/16/18 68.9 kg (152 lb)   08/28/18 67.5 kg (148 lb 12.8 oz)   05/01/18 69.2 kg (152 lb 9.6 oz)   02/16/18 72.6 kg (160 lb)   11/29/17 70.1 kg (154 lb 8 oz)   08/29/17 68.7 kg (151 lb 8 oz)   05/02/17 69 kg (152 lb 1.6 oz)   11/23/16 68.6 kg (151 lb 3.2 oz)   08/16/16 65.9 kg (145 lb 4.8 oz)     HEENT: EOMI, PERRL. Sclerae are anicteric. Oral mucosa is pink and moist with no lesions or thrush.   Lymph: Neck is supple with no lymphadenopathy in the cervical or supraclavicular areas.   Heart: Regular rate and rhythm. CATHIE 2/6  Lungs: Clear to auscultation bilaterally.   BREAST: Right breast normal to inspection.  Well-healed incision at 12:00 position. Right nipple is everted. There is some skin thickening and post-radiation changes. Left breast is notable for the inverted nipple which is stable. No dominant masses.   Abdomen: Bowel sounds present, soft, nontender with no palpable hepatosplenomegaly or masses.   Extremities: No lower extremity edema noted bilaterally.   Neuro: Cranial nerves II through XII are grossly intact.  Skin: No rashes, petechiae, or bruising noted on exposed skin.    Laboratory Data:  Results for orders placed or performed in visit on 02/28/19 (from the past 24 hour(s))   Comprehensive metabolic panel   Result Value Ref Range    Sodium 134 133 - 144 mmol/L    Potassium 4.0 3.4 - 5.3 mmol/L    Chloride 102 94 - 109 mmol/L    Carbon Dioxide 21 20 - 32 mmol/L    Anion Gap 10 3 - 14 mmol/L    Glucose 110 (H) 70 - 99 mg/dL    Urea Nitrogen 16 7 - 30 mg/dL    Creatinine 0.63 0.52 - 1.04 mg/dL    GFR Estimate 86 >60 mL/min/[1.73_m2]    GFR Estimate If Black >90 >60 mL/min/[1.73_m2]    Calcium 8.6 8.5 - 10.1 mg/dL    Bilirubin Total 0.4 0.2 - 1.3 mg/dL    Albumin 3.5 3.4 - 5.0 g/dL    Protein Total 6.8 6.8 - 8.8 g/dL    Alkaline " Phosphatase 80 40 - 150 U/L    ALT 26 0 - 50 U/L    AST 24 0 - 45 U/L   CBC with platelets differential   Result Value Ref Range    WBC 6.5 4.0 - 11.0 10e9/L    RBC Count 4.59 3.8 - 5.2 10e12/L    Hemoglobin 13.0 11.7 - 15.7 g/dL    Hematocrit 40.4 35.0 - 47.0 %    MCV 88 78 - 100 fl    MCH 28.3 26.5 - 33.0 pg    MCHC 32.2 31.5 - 36.5 g/dL    RDW 14.3 10.0 - 15.0 %    Platelet Count 224 150 - 450 10e9/L    Diff Method Automated Method     % Neutrophils 66.1 %    % Lymphocytes 24.0 %    % Monocytes 6.5 %    % Eosinophils 1.9 %    % Basophils 0.6 %    % Immature Granulocytes 0.9 %    Nucleated RBCs 0 0 /100    Absolute Neutrophil 4.3 1.6 - 8.3 10e9/L    Absolute Lymphocytes 1.6 0.8 - 5.3 10e9/L    Absolute Monocytes 0.4 0.0 - 1.3 10e9/L    Absolute Eosinophils 0.1 0.0 - 0.7 10e9/L    Absolute Basophils 0.0 0.0 - 0.2 10e9/L    Abs Immature Granulocytes 0.1 0 - 0.4 10e9/L    Absolute Nucleated RBC 0.0      Imaging:  Exam: Bilateral diagnostic digital mammogram with computer aided  detection and tomosynthesis, 2/28/2019     Comparison: 2/16/2018, 2/15/2017, 2/11/2016 and 2/5/2015     Technique: Tomosynthesis.     History: No current breast concerns. History of right breast cancer  status post right breast conservation therapy. 1 first degree relative  with breast cancer diagnosed at age 38.     BREAST DENSITY: Scattered fibroglandular densities.     Findings: Conservation therapy changes on the right. No significant  change.                                                                      IMPRESSION: BI-RADS CATEGORY: 2 - Benign Finding(s).     RECOMMENDED FOLLOW-UP: Annual Mammography     Results discussed with the patient.     I have personally reviewed the examination and initial interpretation  and I agree with the findings.     PRANAY ROSE MD         Assessment and Plan:    1.  Stage I (T1 N0 M0) infiltrating ductal carcinoma of the right breast, ER positive, [place] positive, HER-2 positive.  Ms. Krishna continues to do  well with no evidence for progressive disease. Mammogram today is BI-RADS 2, benign findings. She has been on Arimidex for 10 years as she started in March of 2009. I will have her follow up with Dr. Caal in 6 months. She prefers to stay on Arimidex until she sees him. Will also schedule mammogram in 1 year with survivorship visit and labs.  2.  Bone health.  DEXA scan in 08/2016 was consistent with low bone density, with prior reports stating she had osteoporosis.  She was on Zometa, and then went on to complete one year of Prolia. She remains off any bisphosphonates now.  --Will repeat DEXA in 6 months when she returns to see Dr. Caal  3. Exercise: She plans to start cardiac rehab. Recommendation is for 150 minutes of exercise per week    Lizzy Vance PA-C  [facility]  [address], [place] [address] [phone]

## 2019-02-28 NOTE — LETTER
2/28/2019      RE: Shannon Krishna  [address]  [facility] [id number]       Oncology/Hematology Visit Note  Feb 28, 2019    DIAGNOSIS:  Stage I (T1 N0 M0) infiltrating ductal carcinoma of the right breast. She was originally diagnosed with a right ultrasound-guided biopsy in June of 2008. This revealed an infiltrating ductal carcinoma, [place] grade 2 of 3, ER-positive, [place]-positive, HER2/alfredito 3+. She went on to receive a right lumpectomy with a sentinel lymph node dissection in July of 2008. [place] lymph nodes were negative for malignancy. She began adjuvant TCH (Taxotere, carboplatin, and Herceptin) and received 6 cycles of chemotherapy, completed in December of 2008. She then went on to complete a whole full year or Herceptin. She is status post breast radiation completed in February of 2009. She has been on Arimidex since March of 2009.     Interval History:  Shannon returns to clinic for routine followup.  She has been feeling generally well.  She has no pain, no fatigue, no depression, no anxiety.  She does have severe aortic stenosis. Saw cardiologist at [place] who recommended cardiac rehab and referred her to here for that. She has not yet had a call to schedule, but has a number to call if needed. She is going to see cardiologist at [place] again in 6 months and Dr. Harvey here in May. She denies any dizziness, dyspnea. She has taken nitroglycerin in the past for chest tightness, but has not needed any for about 1-2 months.    She is on Arimidex wihtout any noticeable side effects. She does have arthritis in her knees, worse on right. Wears ace bandage when walking. No other joint aches. No new bone pain. Denies headaches, neuropathy.     She has history of gastric ulcers and is taking ranitidine. She has osteoporosis but does not like to take calcium supplements. She drinks a lot of milk. Her PCP is Valeria Reynolds MD at [facility] in [place]. She had vaginal bleeding about  1 year ago. This was evaluated with US. This has not recurred.     She has been eating a healthful diet.  She does some walking for exercise.  She does not drink alcohol.  Her most recent DEXA scan in 2016 showed a most valid negative T-score of -2.4.  She has had 6 cycles of Zometa and in 1 year of Prolia.   Prolia stopped in 8/2017 because of significant prior treatment with bone targeted agents per chart. She denies any falls or bone fractures.    Current Outpatient Medications   Medication Sig Dispense Refill     amitriptyline (ELAVIL) 25 MG tablet Take 1-2 tablets by mouth At Bedtime.       anastrozole (ARIMIDEX) 1 MG tablet Take 1 tablet (1 mg) by mouth daily 90 tablet 3     aspirin EC 81 MG EC tablet Take 1 tablet by mouth daily.       atorvastatin (LIPITOR) 40 MG tablet Take 40 mg by mouth daily.       denosumab (PROLIA) 60 MG/ML SOLN injection Inject 60 mg Subcutaneous once       diclofenac (VOLTAREN) 1 % GEL topical gel Apply 4 grams to knees or 2 grams to hands four times daily using enclosed dosing card. 100 g 1     isosorbide mononitrate (IMDUR) 60 MG 24 hr tablet Take 1 tablet (60 mg) by mouth daily 90 tablet 0     lisinopril (PRINIVIL,ZESTRIL) 10 MG tablet Take 1 tablet by mouth daily 10 mg total       metoprolol succinate (TOPROL-XL) 25 MG 24 hr tablet Take 1 tablet (25 mg) by mouth daily 90 tablet 3     Multiple Vitamin (MULTIVITAMINS PO) Take 1 tablet by mouth daily.       nitroGLYcerin (NITROSTAT) 0.4 MG sublingual tablet Place 1 tablet (0.4 mg) under the tongue every 5 minutes as needed for chest pain 25 tablet 1     Omega-3 Fatty Acids (OMEGA 3 PO) Take  by mouth. 1 every two days       oxazepam (SERAX) 10 MG capsule Take 1 capsule by mouth daily.       ranitidine (ZANTAC) 150 MG tablet Take 1 tablet by mouth 2 times daily. In the morning and in the evening.          Physical Examination:  General: The patient is a pleasant female in no acute distress.  /66 (BP Location: Right arm, Patient  "Position: Sitting, Cuff Size: Adult Regular)   Pulse 91   Temp 98.8  F (37.1  C) (Oral)   Resp 16   Ht 1.651 m (5' 5\")   Wt 70.2 kg (154 lb 11.2 oz)   SpO2 98%   BMI 25.74 kg/m     Wt Readings from Last 10 Encounters:   02/28/19 70.2 kg (154 lb 11.2 oz)   11/16/18 68.9 kg (152 lb)   08/28/18 67.5 kg (148 lb 12.8 oz)   05/01/18 69.2 kg (152 lb 9.6 oz)   02/16/18 72.6 kg (160 lb)   11/29/17 70.1 kg (154 lb 8 oz)   08/29/17 68.7 kg (151 lb 8 oz)   05/02/17 69 kg (152 lb 1.6 oz)   11/23/16 68.6 kg (151 lb 3.2 oz)   08/16/16 65.9 kg (145 lb 4.8 oz)     HEENT: EOMI, PERRL. Sclerae are anicteric. Oral mucosa is pink and moist with no lesions or thrush.   Lymph: Neck is supple with no lymphadenopathy in the cervical or supraclavicular areas.   Heart: Regular rate and rhythm. CATHIE 2/6  Lungs: Clear to auscultation bilaterally.   BREAST: Right breast normal to inspection.   Well-healed incision at 12:00 position. Right nipple is everted. There is some skin thickening and post-radiation changes. Left breast is notable for the inverted nipple which is stable. No dominant masses.   Abdomen: Bowel sounds present, soft, nontender with no palpable hepatosplenomegaly or masses.   Extremities: No lower extremity edema noted bilaterally.   Neuro: Cranial nerves II through XII are grossly intact.  Skin: No rashes, petechiae, or bruising noted on exposed skin.    Laboratory Data:  Results for orders placed or performed in visit on 02/28/19 (from the past 24 hour(s))   Comprehensive metabolic panel   Result Value Ref Range    Sodium 134 133 - 144 mmol/L    Potassium 4.0 3.4 - 5.3 mmol/L    Chloride 102 94 - 109 mmol/L    Carbon Dioxide 21 20 - 32 mmol/L    Anion Gap 10 3 - 14 mmol/L    Glucose 110 (H) 70 - 99 mg/dL    Urea Nitrogen 16 7 - 30 mg/dL    Creatinine 0.63 0.52 - 1.04 mg/dL    GFR Estimate 86 >60 mL/min/[1.73_m2]    GFR Estimate If Black >90 >60 mL/min/[1.73_m2]    Calcium 8.6 8.5 - 10.1 mg/dL    Bilirubin Total 0.4 0.2 - " 1.3 mg/dL    Albumin 3.5 3.4 - 5.0 g/dL    Protein Total 6.8 6.8 - 8.8 g/dL    Alkaline Phosphatase 80 40 - 150 U/L    ALT 26 0 - 50 U/L    AST 24 0 - 45 U/L   CBC with platelets differential   Result Value Ref Range    WBC 6.5 4.0 - 11.0 10e9/L    RBC Count 4.59 3.8 - 5.2 10e12/L    Hemoglobin 13.0 11.7 - 15.7 g/dL    Hematocrit 40.4 35.0 - 47.0 %    MCV 88 78 - 100 fl    MCH 28.3 26.5 - 33.0 pg    MCHC 32.2 31.5 - 36.5 g/dL    RDW 14.3 10.0 - 15.0 %    Platelet Count 224 150 - 450 10e9/L    Diff Method Automated Method     % Neutrophils 66.1 %    % Lymphocytes 24.0 %    % Monocytes 6.5 %    % Eosinophils 1.9 %    % Basophils 0.6 %    % Immature Granulocytes 0.9 %    Nucleated RBCs 0 0 /100    Absolute Neutrophil 4.3 1.6 - 8.3 10e9/L    Absolute Lymphocytes 1.6 0.8 - 5.3 10e9/L    Absolute Monocytes 0.4 0.0 - 1.3 10e9/L    Absolute Eosinophils 0.1 0.0 - 0.7 10e9/L    Absolute Basophils 0.0 0.0 - 0.2 10e9/L    Abs Immature Granulocytes 0.1 0 - 0.4 10e9/L    Absolute Nucleated RBC 0.0      Imaging:  Exam: Bilateral diagnostic digital mammogram with computer aided  detection and tomosynthesis, 2/28/2019     Comparison: 2/16/2018, 2/15/2017, 2/11/2016 and 2/5/2015     Technique: Tomosynthesis.     History: No current breast concerns. History of right breast cancer  status post right breast conservation therapy. 1 first degree relative  with breast cancer diagnosed at age 38.     BREAST DENSITY: Scattered fibroglandular densities.     Findings: Conservation therapy changes on the right. No significant  change.                                                                      IMPRESSION: BI-RADS CATEGORY: 2 - Benign Finding(s).     RECOMMENDED FOLLOW-UP: Annual Mammography     Results discussed with the patient.     I have personally reviewed the examination and initial interpretation  and I agree with the findings.     PRANAY ROSE MD       Assessment and Plan:    1.  Stage I (T1 N0 M0) infiltrating ductal carcinoma of  the right breast, ER positive, [place] positive, HER-2 positive.  Ms. Krishna continues to do well with no evidence for progressive disease.  Mammogram today is BI-RADS 2, benign findings. She has been on Arimidex for 10 years as she started in March of 2009. I will have her follow up with Dr. Caal in 6 months. She prefers to stay on Arimidex until she sees him. Will also schedule mammogram in 1 year with survivorship visit and labs.  2.  Bone health.  DEXA scan in 08/2016 was consistent with low bone density, with prior reports stating she had osteoporosis.  She was on Zometa, and then went on to complete one year of Prolia. She remains off any bisphosphonates now.  --Will repeat DEXA in 6 months when she returns to see Dr. Caal  3. Exercise: She plans to start cardiac rehab. Recommendation is for 150 minutes of exercise per week    Lizzy Vance PA-C  [facility]  [address] [place] [id number] [phone]

## 2019-02-28 NOTE — NURSING NOTE
"Oncology Rooming Note    February 28, 2019 1:36 PM   Shannon Krishna is a 78 year old female who presents for:    Chief Complaint   Patient presents with     Blood Draw     Labs drawn via  by RN in lab. VS taken.      Oncology Clinic Visit     Visit related to Breast Cancer     Initial Vitals: /66 (BP Location: Right arm, Patient Position: Sitting, Cuff Size: Adult Regular)   Pulse 91   Temp 98.8  F (37.1  C) (Oral)   Resp 16   Ht 1.651 m (5' 5\")   Wt 70.2 kg (154 lb 11.2 oz)   SpO2 98%   BMI 25.74 kg/m   Estimated body mass index is 25.74 kg/m  as calculated from the following:    Height as of this encounter: 1.651 m (5' 5\").    Weight as of this encounter: 70.2 kg (154 lb 11.2 oz). Body surface area is 1.79 meters squared.  No Pain (0) Comment: Data Unavailable   No LMP recorded. Patient is postmenopausal.  Allergies reviewed: Yes  Medications reviewed: Yes    Medications: Medication refills not needed today.  Pharmacy name entered into [facility]: [facility] - [place], [place] - [address] [place] [address]    Clinical concerns: No new concerns. Provider was notified.      Yadira Sotelo LPN            "

## 2019-04-09 DIAGNOSIS — C50.411 BREAST CANCER OF UPPER-OUTER QUADRANT OF RIGHT FEMALE BREAST (H): ICD-10-CM

## 2019-04-12 RX ORDER — ANASTROZOLE 1 MG/1
1 TABLET ORAL DAILY
Qty: 90 TABLET | Refills: 3 | Status: SHIPPED | OUTPATIENT
Start: 2019-04-12 | End: 2019-09-05

## 2019-04-15 DIAGNOSIS — I25.10 CAD (CORONARY ARTERY DISEASE): ICD-10-CM

## 2019-04-16 RX ORDER — METOPROLOL SUCCINATE 25 MG/1
25 TABLET, EXTENDED RELEASE ORAL DAILY
Qty: 90 TABLET | Refills: 3 | Status: SHIPPED | OUTPATIENT
Start: 2019-04-16 | End: 2020-06-12

## 2019-04-16 NOTE — TELEPHONE ENCOUNTER
Last Clinic Visit: 5/1/2018 [facility]  Next Clinic Visit: 5-14-19  90 day RF sent to pharm

## 2019-04-30 NOTE — TELEPHONE ENCOUNTER
FUTURE VISIT INFORMATION      FUTURE VISIT INFORMATION:    Date: 5/21/19    Time: 10:30 am    Location: [facility]  REFERRAL INFORMATION:    Referring provider:  Dr. Lara patient    Referring providers clinic:  [facility]    Reason for visit/diagnosis  6 month f/u for ear cleaning    RECORDS REQUESTED FROM:       Clinic name Comments Records Status Imaging Status   [facility] Office Visit-11/16/18, 5/16/18-Dr. Lara Epic

## 2019-05-13 ENCOUNTER — DOCUMENTATION ONLY (OUTPATIENT)
Dept: CARE COORDINATION | Facility: CLINIC | Age: 79
End: 2019-05-13

## 2019-05-14 ENCOUNTER — OFFICE VISIT (OUTPATIENT)
Dept: CARDIOLOGY | Facility: CLINIC | Age: 79
End: 2019-05-14
Attending: INTERNAL MEDICINE
Payer: MEDICARE

## 2019-05-14 VITALS
HEIGHT: 65 IN | SYSTOLIC BLOOD PRESSURE: 107 MMHG | BODY MASS INDEX: 24.89 KG/M2 | WEIGHT: 149.4 LBS | DIASTOLIC BLOOD PRESSURE: 66 MMHG | HEART RATE: 58 BPM | OXYGEN SATURATION: 96 %

## 2019-05-14 DIAGNOSIS — I35.0 NONRHEUMATIC AORTIC VALVE STENOSIS: Primary | ICD-10-CM

## 2019-05-14 PROCEDURE — 99214 OFFICE O/P EST MOD 30 MIN: CPT | Mod: ZP | Performed by: INTERNAL MEDICINE

## 2019-05-14 PROCEDURE — G0463 HOSPITAL OUTPT CLINIC VISIT: HCPCS | Mod: ZF

## 2019-05-14 ASSESSMENT — MIFFLIN-ST. JEOR: SCORE: 1163.55

## 2019-05-14 ASSESSMENT — PAIN SCALES - GENERAL: PAINLEVEL: NO PAIN (0)

## 2019-05-14 NOTE — PATIENT INSTRUCTIONS
Patient Instructions:  Return as needed, per your discussion with Dr. Harvey.  When you need refills, call your pharmacy 1-2 weeks before you run out, and they will process the refills for you.      It was a pleasure to see you in the cardiology clinic today.    We are encouraging the use of [employer]ather Apphart to communicate with your Healthcare Provider.  If you have any questions, call  Manny Dixon LPN, at [phone].  Press Option #1 for the [facility], and then press Option #4 for nursing.      If you have an urgent need after hours (8:00 am to 4:30 pm) please call [phone] and ask for the cardiology fellow on call.  t

## 2019-05-14 NOTE — LETTER
5/14/2019      RE: Shannon Krishna  [address]  [facility] [address]       Dear Colleague,    Thank you for the opportunity to participate in the care of your patient, Shannon Krishna, at the [facility] HEART [facility] at [facility]. Please see a copy of my visit note below.       SUBJECTIVE:  Shannon Krishna is a 78 year old female who presents for yearly follow-up.  Patient with chronic  of RCA.  This is well collateralized.  Patient uses PRN nitroglycerin for atypical chest pain.  Also known to have aortic stenosis and recent echo showed significant progression to moderate to severe aortic stenosis.  Patient been to [facility] earlier this year.  Had all evaluations.  Confirms severe aortic stenosis.  Suggested TAVR and patient is reluctant as she do not have any symptoms.  She did poorly on a cardiopulmonary stress test.  She had extremely poor functional capacity.    Patient Active Problem List    Diagnosis Date Noted     Bilateral impacted cerumen 11/20/2017     Priority: Medium     Collapse of both external ear canals 11/20/2017     Priority: Medium     Age-related osteoporosis with current pathological fracture 08/16/2016     Priority: Medium     Breast cancer of upper-outer quadrant of right female breast (H) 12/16/2015     Priority: Medium     Osteoporosis 08/06/2013     Priority: Medium     Imo Update utility       Status post coronary angiogram 09/27/2012     Priority: Medium     Coronary artery disease 09/27/2012     Priority: Medium     S/P angioplasty 08/30/2012     Priority: Medium     CAD (coronary artery disease) 08/30/2012     Priority: Medium     Aortic sclerosis      Priority: Medium     Hypertension      Priority: Medium     Hyperlipidemia      Priority: Medium     Esophageal reflux      Priority: Medium     Adenocarcinoma of breast (H)      Priority: Medium     Breast cancer (H) 03/16/2011     Priority: Medium    .  Current Outpatient  Medications   Medication Sig     amitriptyline (ELAVIL) 25 MG tablet Take 1-2 tablets by mouth At Bedtime.     anastrozole (ARIMIDEX) 1 MG tablet Take 1 tablet (1 mg) by mouth daily     aspirin EC 81 MG EC tablet Take 1 tablet by mouth daily.     atorvastatin (LIPITOR) 40 MG tablet Take 40 mg by mouth daily.     isosorbide mononitrate (IMDUR) 60 MG 24 hr tablet Take 1 tablet (60 mg) by mouth daily     lisinopril (PRINIVIL,ZESTRIL) 10 MG tablet Take 1 tablet by mouth daily 10 mg total     metoprolol succinate ER (TOPROL-XL) 25 MG 24 hr tablet Take 1 tablet (25 mg) by mouth daily Please keep 5-14-19 clinic appt for further refills     nitroGLYcerin (NITROSTAT) 0.4 MG sublingual tablet Place 1 tablet (0.4 mg) under the tongue every 5 minutes as needed for chest pain     oxazepam (SERAX) 10 MG capsule Take 1 capsule by mouth daily.     ranitidine (ZANTAC) 150 MG tablet Take 1 tablet by mouth 2 times daily. In the morning and in the evening.      denosumab (PROLIA) 60 MG/ML SOLN injection Inject 60 mg Subcutaneous once     diclofenac (VOLTAREN) 1 % GEL topical gel Apply 4 grams to knees or 2 grams to hands four times daily using enclosed dosing card. (Patient not taking: Reported on 5/14/2019)     Multiple Vitamin (MULTIVITAMINS PO) Take 1 tablet by mouth daily.     Omega-3 Fatty Acids (OMEGA 3 PO) Take  by mouth. 1 every two days     No current facility-administered medications for this visit.      Past Medical History:   Diagnosis Date     Adenocarcinoma of breast (H)      Adenomatous polyp of colon 2003     Aortic sclerosis     Mild to moderate sclerosis without significant stenosis.     Arthritis 2015     Coronary artery disease     Multivessel CAD,  of the mid RCA     Esophageal reflux      Hearing problem 2010     History of radiation therapy 2008     Hyperlipidemia      Hypertension      Osteoporosis      Past Surgical History:   Procedure Laterality Date     APPENDECTOMY      1950's     BIOPSY  2008     ultrasound guided biopsy of right breast     BREAST SURGERY       CARDIAC SURGERY  2008     COLONOSCOPY  2015     GENITOURINARY SURGERY  1970    cryosurgery on vagina     GENITOURINARY SURGERY      conization     GI SURGERY  's    perforated ulcer     GYN SURGERY       LUMPECTOMY BREAST  2008    breast cancer, done in [facility]     port-a-cath  2008    Removed in      TONSILLECTOMY  1950     VASCULAR SURGERY       Allergies   Allergen Reactions     Nkda [No Known Drug Allergies]      Social History     Socioeconomic History     Marital status: Legally      Spouse name: Not on file     Number of children: Not on file     Years of education: Not on file     Highest education level: Not on file   Occupational History     Not on file   Social Needs     Financial resource strain: Not on file     Food insecurity:     Worry: Not on file     Inability: Not on file     Transportation needs:     Medical: Not on file     Non-medical: Not on file   Tobacco Use     Smoking status: Former Smoker     Packs/day: 1.00     Years: 30.00     Pack years: 30.00     Types: Cigarettes     Start date: 1960     Last attempt to quit: 1990     Years since quittin.3     Smokeless tobacco: Former User     Quit date: 1994   Substance and Sexual Activity     Alcohol use: No     Drug use: No     Sexual activity: Not Currently     Birth control/protection: None   Lifestyle     Physical activity:     Days per week: Not on file     Minutes per session: Not on file     Stress: Not on file   Relationships     Social connections:     Talks on phone: Not on file     Gets together: Not on file     Attends Congregational service: Not on file     Active member of club or organization: Not on file     Attends meetings of clubs or organizations: Not on file     Relationship status: Not on file     Intimate partner violence:     Fear of current or ex partner: Not on file     Emotionally abused: Not on file     Physically  "abused: Not on file     Forced sexual activity: Not on file   Other Topics Concern     Parent/sibling w/ CABG, MI or angioplasty before 65F 55M? Not Asked   Social History Narrative     Not on file     Family History   Problem Relation Age of Onset     Family History Negative Other      Cancer Mother      Breast Cancer Mother      Diabetes Father      Heart Disease Father      Cerebrovascular Disease Father         ,, ,, ,, ,, ,, ,, ,, ,     Heart Disease Brother      Hyperlipidemia Brother      Heart Disease Brother      Glaucoma No family hx of      Macular Degeneration No family hx of      Amblyopia No family hx of      Coronary Artery Disease No family hx of           REVIEW OF SYSTEMS:  General: negative, fever, chills, night sweats  Skin: negative, acne, rash and scaling  Eyes: negative, double vision, eye pain and photophobia  Ears/Nose/Throat: negative, nasal congestion and purulent rhinorrhea  Respiratory: No dyspnea on exertion, No cough, No hemoptysis and negative  Cardiovascular: negative, palpitations, tachycardia, irregular heart beat, chest pain, exertional chest pain or pressure, paroxysmal nocturnal dyspnea, dyspnea on exertion and orthopnea       OBJECTIVE:  Blood pressure 107/66, pulse 58, height 1.651 m (5' 5\"), weight 67.8 kg (149 lb 6.4 oz), SpO2 96 %, not currently breastfeeding.  General Appearance: alert and no distress  Head: Normocephalic. No masses, lesions, tenderness or abnormalities  Eyes: conjuctiva clear, PERRL, EOM intact  Ears: External ears normal. Canals clear. TM's normal.  Nose: Nares normal  Mouth: normal  Neck: Supple, no cervical adenopathy, no thyromegaly  Lungs: clear to auscultation  Cardiac: regular rate and rhythm, normal S1 and S2, no murmur         ASSESSMENT/PLAN:  Patient here for follow-up.  Chronic  of RCA with built collateralization.  Uses as needed nitrate.  Last echocardiogram here showed severe aortic stenosis.  Earlier this year patient was evaluated at " [facility] for aortic stenosis.  Mean aortic gradient is 39 mmHg and a calculated valve area of 0.76.  She was advised to undergo TAVR.  Patient is reluctant as she is asymptomatic.  Patient do have a reevaluation in coming August.  [facility]  Currently patient is asymptomatic.  Suggested that she is not a very active person and that may be the reason that she is asymptomatic.  She did poorly during a cardiopulmonary stress test at [facility].  It suggested severe deconditioning.  Patient do not wish to have any procedure done at this time.  Per orders.   Return to Clinic PRN.  Answers for HPI/ROS submitted by the patient on 5/14/2019   General Symptoms: No  Skin Symptoms: No  HENT Symptoms: No  EYE SYMPTOMS: No  HEART SYMPTOMS: No  LUNG SYMPTOMS: No  INTESTINAL SYMPTOMS: No  URINARY SYMPTOMS: No  GYNECOLOGIC SYMPTOMS: No  BREAST SYMPTOMS: No  SKELETAL SYMPTOMS: No  BLOOD SYMPTOMS: No  NERVOUS SYSTEM SYMPTOMS: No  MENTAL HEALTH SYMPTOMS: No      Please do not hesitate to contact me if you have any questions/concerns.     Sincerely,     NURYS Shin MD

## 2019-05-14 NOTE — PROGRESS NOTES
SUBJECTIVE:  Sahnnon Krishna is a 78 year old female who presents for yearly follow-up.  Patient with chronic  of RCA.  This is well collateralized.  Patient uses PRN nitroglycerin for atypical chest pain.  Also known to have aortic stenosis and recent echo showed significant progression to moderate to severe aortic stenosis.  Patient been to [place] earlier this year.  Had all evaluations.  Confirms severe aortic stenosis.  Suggested TAVR and patient is reluctant as she do not have any symptoms.  She did poorly on a cardiopulmonary stress test.  She had extremely poor functional capacity.    Patient Active Problem List    Diagnosis Date Noted     Bilateral impacted cerumen 11/20/2017     Priority: Medium     Collapse of both external ear canals 11/20/2017     Priority: Medium     Age-related osteoporosis with current pathological fracture 08/16/2016     Priority: Medium     Breast cancer of upper-outer quadrant of right female breast (H) 12/16/2015     Priority: Medium     Osteoporosis 08/06/2013     Priority: Medium     Imo Update utility       Status post coronary angiogram 09/27/2012     Priority: Medium     Coronary artery disease 09/27/2012     Priority: Medium     S/P angioplasty 08/30/2012     Priority: Medium     CAD (coronary artery disease) 08/30/2012     Priority: Medium     Aortic sclerosis      Priority: Medium     Hypertension      Priority: Medium     Hyperlipidemia      Priority: Medium     Esophageal reflux      Priority: Medium     Adenocarcinoma of breast (H)      Priority: Medium     Breast cancer (H) 03/16/2011     Priority: Medium    .  Current Outpatient Medications   Medication Sig     amitriptyline (ELAVIL) 25 MG tablet Take 1-2 tablets by mouth At Bedtime.     anastrozole (ARIMIDEX) 1 MG tablet Take 1 tablet (1 mg) by mouth daily     aspirin EC 81 MG EC tablet Take 1 tablet by mouth daily.     atorvastatin (LIPITOR) 40 MG tablet Take 40 mg by mouth daily.     isosorbide mononitrate  (IMDUR) 60 MG 24 hr tablet Take 1 tablet (60 mg) by mouth daily     lisinopril (PRINIVIL,ZESTRIL) 10 MG tablet Take 1 tablet by mouth daily 10 mg total     metoprolol succinate ER (TOPROL-XL) 25 MG 24 hr tablet Take 1 tablet (25 mg) by mouth daily Please keep 5-14-19 clinic appt for further refills     nitroGLYcerin (NITROSTAT) 0.4 MG sublingual tablet Place 1 tablet (0.4 mg) under the tongue every 5 minutes as needed for chest pain     oxazepam (SERAX) 10 MG capsule Take 1 capsule by mouth daily.     ranitidine (ZANTAC) 150 MG tablet Take 1 tablet by mouth 2 times daily. In the morning and in the evening.      denosumab (PROLIA) 60 MG/ML SOLN injection Inject 60 mg Subcutaneous once     diclofenac (VOLTAREN) 1 % GEL topical gel Apply 4 grams to knees or 2 grams to hands four times daily using enclosed dosing card. (Patient not taking: Reported on 5/14/2019)     Multiple Vitamin (MULTIVITAMINS PO) Take 1 tablet by mouth daily.     Omega-3 Fatty Acids (OMEGA 3 PO) Take  by mouth. 1 every two days     No current facility-administered medications for this visit.      Past Medical History:   Diagnosis Date     Adenocarcinoma of breast (H)      Adenomatous polyp of colon 2003     Aortic sclerosis     Mild to moderate sclerosis without significant stenosis.     Arthritis 2015     Coronary artery disease     Multivessel CAD,  of the mid RCA     Esophageal reflux      Hearing problem 2010     History of radiation therapy 2008     Hyperlipidemia      Hypertension      Osteoporosis      Past Surgical History:   Procedure Laterality Date     APPENDECTOMY      1950's     BIOPSY  2008    ultrasound guided biopsy of right breast     BREAST SURGERY       CARDIAC SURGERY  2008     COLONOSCOPY  2015     GENITOURINARY SURGERY  1970    cryosurgery on vagina     GENITOURINARY SURGERY  1975    conization     GI SURGERY  1960's    perforated ulcer     GYN SURGERY  1975     LUMPECTOMY BREAST  2008    breast cancer, done in [facility]      port-a-cath  2008    Removed in      TONSILLECTOMY  1950     VASCULAR SURGERY       Allergies   Allergen Reactions     Nkda [No Known Drug Allergies]      Social History     Socioeconomic History     Marital status: Legally      Spouse name: Not on file     Number of children: Not on file     Years of education: Not on file     Highest education level: Not on file   Occupational History     Not on file   Social Needs     Financial resource strain: Not on file     Food insecurity:     Worry: Not on file     Inability: Not on file     Transportation needs:     Medical: Not on file     Non-medical: Not on file   Tobacco Use     Smoking status: Former Smoker     Packs/day: 1.00     Years: 30.00     Pack years: 30.00     Types: Cigarettes     Start date: 1960     Last attempt to quit: 1990     Years since quittin.3     Smokeless tobacco: Former User     Quit date: 1994   Substance and Sexual Activity     Alcohol use: No     Drug use: No     Sexual activity: Not Currently     Birth control/protection: None   Lifestyle     Physical activity:     Days per week: Not on file     Minutes per session: Not on file     Stress: Not on file   Relationships     Social connections:     Talks on phone: Not on file     Gets together: Not on file     Attends [employer] service: Not on file     Active member of club or organization: Not on file     Attends meetings of clubs or organizations: Not on file     Relationship status: Not on file     Intimate partner violence:     Fear of current or ex partner: Not on file     Emotionally abused: Not on file     Physically abused: Not on file     Forced sexual activity: Not on file   Other Topics Concern     Parent/sibling w/ CABG, MI or angioplasty before 65F 55M? Not Asked   Social History Narrative     Not on file     Family History   Problem Relation Age of Onset     Family History Negative Other      Cancer Mother      Breast Cancer Mother      Diabetes  "Father      Heart Disease Father      Cerebrovascular Disease Father         ,, ,, ,, ,, ,, ,, ,, ,     Heart Disease Brother      Hyperlipidemia Brother      Heart Disease Brother      Glaucoma No family hx of      Macular Degeneration No family hx of      Amblyopia No family hx of      Coronary Artery Disease No family hx of           REVIEW OF SYSTEMS:  General: negative, fever, chills, night sweats  Skin: negative, acne, rash and scaling  Eyes: negative, double vision, eye pain and photophobia  Ears/Nose/Throat: negative, nasal congestion and purulent rhinorrhea  Respiratory: No dyspnea on exertion, No cough, No hemoptysis and negative  Cardiovascular: negative, palpitations, tachycardia, irregular heart beat, chest pain, exertional chest pain or pressure, paroxysmal nocturnal dyspnea, dyspnea on exertion and orthopnea       OBJECTIVE:  Blood pressure 107/66, pulse 58, height 1.651 m (5' 5\"), weight 67.8 kg (149 lb 6.4 oz), SpO2 96 %, not currently breastfeeding.  General Appearance: alert and no distress  Head: Normocephalic. No masses, lesions, tenderness or abnormalities  Eyes: conjuctiva clear, PERRL, EOM intact  Ears: External ears normal. Canals clear. TM's normal.  Nose: Nares normal  Mouth: normal  Neck: Supple, no cervical adenopathy, no thyromegaly  Lungs: clear to auscultation  Cardiac: regular rate and rhythm, normal S1 and S2, no murmur         ASSESSMENT/PLAN:  Patient here for follow-up.  Chronic  of RCA with built collateralization.  Uses as needed nitrate.  Last echocardiogram here showed severe aortic stenosis.  Earlier this year patient was evaluated at [facility] for aortic stenosis.  Mean aortic gradient is 39 mmHg and a calculated valve area of 0.76.  She was advised to undergo TAVR.  Patient is reluctant as she is asymptomatic.  Patient do have a reevaluation in coming August.  Patient would like to continue to use the [facility] for emergencies.  Currently patient is asymptomatic.  " Suggested that she is not a very active person and that may be the reason that she is asymptomatic.  She did poorly during a cardiopulmonary stress test at [facility].  It suggested severe deconditioning.  Patient do not wish to have any procedure done at this time.  Per orders.   Return to Clinic PRN.  Answers for HPI/ROS submitted by the patient on 5/14/2019   General Symptoms: No  Skin Symptoms: No  HENT Symptoms: No  EYE SYMPTOMS: No  HEART SYMPTOMS: No  LUNG SYMPTOMS: No  INTESTINAL SYMPTOMS: No  URINARY SYMPTOMS: No  GYNECOLOGIC SYMPTOMS: No  BREAST SYMPTOMS: No  SKELETAL SYMPTOMS: No  BLOOD SYMPTOMS: No  NERVOUS SYSTEM SYMPTOMS: No  MENTAL HEALTH SYMPTOMS: No

## 2019-05-14 NOTE — NURSING NOTE
Chief Complaint   Patient presents with     Follow Up     Type of visit : follow up per Dr. Harvey     Vitals were taken and medications were reconciled.     RENITA Xie  11:08 AM

## 2019-05-21 ENCOUNTER — PRE VISIT (OUTPATIENT)
Dept: OTOLARYNGOLOGY | Facility: CLINIC | Age: 79
End: 2019-05-21

## 2019-05-21 ENCOUNTER — OFFICE VISIT (OUTPATIENT)
Dept: OTOLARYNGOLOGY | Facility: CLINIC | Age: 79
End: 2019-05-21
Payer: MEDICARE

## 2019-05-21 VITALS
HEIGHT: 65 IN | WEIGHT: 150.4 LBS | DIASTOLIC BLOOD PRESSURE: 65 MMHG | HEART RATE: 84 BPM | SYSTOLIC BLOOD PRESSURE: 120 MMHG | BODY MASS INDEX: 25.06 KG/M2 | OXYGEN SATURATION: 97 %

## 2019-05-21 DIAGNOSIS — H61.22 EXCESSIVE CERUMEN IN EAR CANAL, LEFT: ICD-10-CM

## 2019-05-21 DIAGNOSIS — H61.21 IMPACTED CERUMEN OF RIGHT EAR: Primary | ICD-10-CM

## 2019-05-21 ASSESSMENT — MIFFLIN-ST. JEOR: SCORE: 1168.09

## 2019-05-21 NOTE — PROGRESS NOTES
Dear Valeria Reyez:    I had the pleasure of seeing Shannon Krishna in followup today at the [facility].    CHIEF COMPLAINT: Ears    HISTORY OF PRESENT ILLNESS: Patient is a 78-year-old patient of Dr. Valenzuela.  He has been seen here for six-month cleanings for some time.  She has small ear canals and has had problems with cerumen impactions bilaterally.  Her last visit was 11/16/2018.  She comes in today and says ears feel plugged and full.  There is no actual pain, denies any drainage.  Hearing is somewhat muffled bilaterally.  She denies any dysphasia, hoarseness, facial paresthesias.    MEDICATIONS: Please refer to the detailed medication reconciliation performed by my nurse today, which I have reviewed and signed.     ALLERGIES:    Allergies   Allergen Reactions     Nkda [No Known Drug Allergies]        HABITS: Social History    Substance and Sexual Activity      Alcohol use: No     History   Smoking Status     Former Smoker     Packs/day: 1.00     Years: 30.00     Types: Cigarettes     Start date: 1/1/1960     Quit date: 1/1/1990   Smokeless Tobacco     Never Used         PAST MEDICAL HISTORY:  Please see today's intake form (for the remainder of the PMH) which I reviewed and signed.  Past Medical History:   Diagnosis Date     Adenocarcinoma of breast (H)      Adenomatous polyp of colon 2003     Aortic sclerosis     Mild to moderate sclerosis without significant stenosis.     Arthritis 2015     Coronary artery disease     Multivessel CAD,  of the mid RCA     Esophageal reflux      Hearing problem 2010     History of radiation therapy 2008     Hyperlipidemia      Hypertension      Osteoporosis        FAMILY HISTORY/SOCIAL HISTORY:    Family History   Problem Relation Age of Onset     Family History Negative Other      Cancer Mother      Breast Cancer Mother      Diabetes Father      Heart Disease Father      Cerebrovascular Disease Father         ,, ,,  ,, ,, ,, ,, ,, ,     Heart Disease Brother      Hyperlipidemia Brother      Heart Disease Brother      Diabetes Other      Glaucoma No family hx of      Macular Degeneration No family hx of      Amblyopia No family hx of      Coronary Artery Disease No family hx of       Social History     Socioeconomic History     Marital status: Legally      Spouse name: Not on file     Number of children: Not on file     Years of education: Not on file     Highest education level: Not on file   Occupational History     Not on file   Social Needs     Financial resource strain: Not on file     Food insecurity:     Worry: Not on file     Inability: Not on file     Transportation needs:     Medical: Not on file     Non-medical: Not on file   Tobacco Use     Smoking status: Former Smoker     Packs/day: 1.00     Years: 30.00     Pack years: 30.00     Types: Cigarettes     Start date: 1960     Last attempt to quit: 1990     Years since quittin.4     Smokeless tobacco: Never Used   Substance and Sexual Activity     Alcohol use: No     Drug use: No     Sexual activity: Not Currently     Partners: Male     Birth control/protection: None   Lifestyle     Physical activity:     Days per week: Not on file     Minutes per session: Not on file     Stress: Not on file   Relationships     Social connections:     Talks on phone: Not on file     Gets together: Not on file     Attends Yazidi service: Not on file     Active member of club or organization: Not on file     Attends meetings of clubs or organizations: Not on file     Relationship status: Not on file     Intimate partner violence:     Fear of current or ex partner: Not on file     Emotionally abused: Not on file     Physically abused: Not on file     Forced sexual activity: Not on file   Other Topics Concern     Parent/sibling w/ CABG, MI or angioplasty before 65F 55M? Not Asked   Social History Narrative     Not on file       REVIEW OF SYSTEMS: Patient Supplied  Answers to Review of Systems   ENT ROS 11/2/2018   Ears, Nose, Throat Hearing loss   Musculoskeletal -       The remainder of the 10 point ROS is negative    PHYSICIAL EXAMINATION:  Constitutional: The patient was well-groomed and in no acute distress.   Skin: Warm and pink.  Psychiatric: The patient's affect was calm, cooperative, and appropriate.   Respiratory: Breathing comfortably without stridor or exertion of accessory muscles.  Eyes: Pupils were equal and reactive. Extraocular movement intact.   Head: Normocephalic and atraumatic. No lesions or scars.  Ears: Both ears examined she does have small ear canals and looks like bilateral impactions.  She was placed under the microscope and under high-power magnification the right side shows a complete impaction with small meatus.  Under high-power magnification, the cerumen was mobilized with the right angled hook and alternating between curette, alligator forceps, and suction the impaction was slowly removed and teased out of the canal.  With small suction, the last remaining cerumen under the anterior canal overhang was removed.  TM and middle ear look normal after cleaning.  The left ear was cleaned using similar instruments, was not quite as impacted.  After cleaning, TM and middle ear again look normal.  Nose: Sinuses were nontender. Anterior rhinoscopy revealed midline septum and absence of purulence or polyps.  Oral Cavity: Normal tongue, floor of mouth, buccal mucosa, and palate. No abnormal lymph tissue in the oropharynx.   Neck: The parotid is soft without masses. Supple with normal laryngeal and tracheal landmarks.   Lymphatic: There is no palpable lymphadenopathy or other masses in the neck.   Neurologic: Alert and oriented x 3. Cranial nerves III-XI within normal limits. Voice quality normal.  Cerebellar Function Tests:  Grossly normal    Audiogram: None today, tuning fork show normal responses after cleaning.    IMPRESSION AND PLAN:   1. Right  cerumen impaction: Cleaned today under microscopic control and instruments.  Normal appearance after cleaning, no further treatment needed.  2. Left excessive cerumen: Cleaned today, no further treatment needed.    Patient prefers to continue to come in for six-month follow-ups.  We will see her at that time, sooner with any concerns or problems.    Thank you very much for the opportunity to participate in the care of your patient.    Rick L Nissen MD

## 2019-05-21 NOTE — PATIENT INSTRUCTIONS
1.  You were seen in the ENT Clinic today by Dr. Nissen.  If you have any questions or concerns after your appointment, please call [phone]. Press option #1 for scheduling related needs. Press option #3 for Nurse advice.    2.  Plan is to return to clinic in 6 months, or sooner with any concerns.      Sandra Macedo LPN  [facility]  [phone]

## 2019-05-21 NOTE — NURSING NOTE
"Chief Complaint   Patient presents with     Consult     New consult, ear cleaning       Vitals:    05/21/19 1123   BP: 120/65   Pulse: 84   SpO2: 97%   Weight: 68.2 kg (150 lb 6.4 oz)   Height: 1.651 m (5' 5\")       Body mass index is 25.03 kg/m .    Melinda Wyatt CMA    "

## 2019-05-21 NOTE — LETTER
5/21/2019     RE: Shannon Krishna  [address]  [facility] [address]     Dear Colleague,    Thank you for referring your patient, Shannon Krishna, to the [facility] EAR [facility] AND THROAT at [facility]. Please see a copy of my visit note below.    Dear Valeria eRyez:    I had the pleasure of seeing Shannon Krishna in followup today at the [facility].    CHIEF COMPLAINT: Ears    HISTORY OF PRESENT ILLNESS: Patient is a 78-year-old patient of Dr. Valenzuela.  He has been seen here for six-month cleanings for some time.  She has small ear canals and has had problems with cerumen impactions bilaterally.  Her last visit was 11/16/2018.  She comes in today and says ears feel plugged and full.  There is no actual pain, denies any drainage.  Hearing is somewhat muffled bilaterally.  She denies any dysphasia, hoarseness, facial paresthesias.    MEDICATIONS: Please refer to the detailed medication reconciliation performed by my nurse today, which I have reviewed and signed.     ALLERGIES:    Allergies   Allergen Reactions     Nkda [No Known Drug Allergies]        HABITS: Social History    Substance and Sexual Activity      Alcohol use: No     History   Smoking Status     Former Smoker     Packs/day: 1.00     Years: 30.00     Types: Cigarettes     Start date: 1/1/1960     Quit date: 1/1/1990   Smokeless Tobacco     Never Used         PAST MEDICAL HISTORY:  Please see today's intake form (for the remainder of the PMH) which I reviewed and signed.  Past Medical History:   Diagnosis Date     Adenocarcinoma of breast (H)      Adenomatous polyp of colon 2003     Aortic sclerosis     Mild to moderate sclerosis without significant stenosis.     Arthritis 2015     Coronary artery disease     Multivessel CAD,  of the mid RCA     Esophageal reflux      Hearing problem 2010     History of radiation therapy 2008      Hyperlipidemia      Hypertension      Osteoporosis        FAMILY HISTORY/SOCIAL HISTORY:    Family History   Problem Relation Age of Onset     Family History Negative Other      Cancer Mother      Breast Cancer Mother      Diabetes Father      Heart Disease Father      Cerebrovascular Disease Father         ,, ,, ,, ,, ,, ,, ,, ,     Heart Disease Brother      Hyperlipidemia Brother      Heart Disease Brother      Diabetes Other      Glaucoma No family hx of      Macular Degeneration No family hx of      Amblyopia No family hx of      Coronary Artery Disease No family hx of       Social History     Socioeconomic History     Marital status: Legally      Spouse name: Not on file     Number of children: Not on file     Years of education: Not on file     Highest education level: Not on file   Occupational History     Not on file   Social Needs     Financial resource strain: Not on file     Food insecurity:     Worry: Not on file     Inability: Not on file     Transportation needs:     Medical: Not on file     Non-medical: Not on file   Tobacco Use     Smoking status: Former Smoker     Packs/day: 1.00     Years: 30.00     Pack years: 30.00     Types: Cigarettes     Start date: 1960     Last attempt to quit: 1990     Years since quittin.4     Smokeless tobacco: Never Used   Substance and Sexual Activity     Alcohol use: No     Drug use: No     Sexual activity: Not Currently     Partners: Male     Birth control/protection: None   Lifestyle     Physical activity:     Days per week: Not on file     Minutes per session: Not on file     Stress: Not on file   Relationships     Social connections:     Talks on phone: Not on file     Gets together: Not on file     Attends Hoahaoism service: Not on file     Active member of club or organization: Not on file     Attends meetings of clubs or organizations: Not on file     Relationship status: Not on file     Intimate partner violence:     Fear of current or ex  partner: Not on file     Emotionally abused: Not on file     Physically abused: Not on file     Forced sexual activity: Not on file   Other Topics Concern     Parent/sibling w/ CABG, MI or angioplasty before 65F 55M? Not Asked   Social History Narrative     Not on file       REVIEW OF SYSTEMS: Patient Supplied Answers to Review of Systems   ENT ROS 11/2/2018   Ears, Nose, Throat Hearing loss   Musculoskeletal -       The remainder of the 10 point ROS is negative    PHYSICIAL EXAMINATION:  Constitutional: The patient was well-groomed and in no acute distress.   Skin: Warm and pink.  Psychiatric: The patient's affect was calm, cooperative, and appropriate.   Respiratory: Breathing comfortably without stridor or exertion of accessory muscles.  Eyes: Pupils were equal and reactive. Extraocular movement intact.   Head: Normocephalic and atraumatic. No lesions or scars.  Ears: Both ears examined she does have small ear canals and looks like bilateral impactions.  She was placed under the microscope and under high-power magnification the right side shows a complete impaction with small meatus.  Under high-power magnification, the cerumen was mobilized with the right angled hook and alternating between curette, alligator forceps, and suction the impaction was slowly removed and teased out of the canal.  With small suction, the last remaining cerumen under the anterior canal overhang was removed.  TM and middle ear look normal after cleaning.  The left ear was cleaned using similar instruments, was not quite as impacted.  After cleaning, TM and middle ear again look normal.  Nose: Sinuses were nontender. Anterior rhinoscopy revealed midline septum and absence of purulence or polyps.  Oral Cavity: Normal tongue, floor of mouth, buccal mucosa, and palate. No abnormal lymph tissue in the oropharynx.   Neck: The parotid is soft without masses. Supple with normal laryngeal and tracheal landmarks.   Lymphatic: There is no  palpable lymphadenopathy or other masses in the neck.   Neurologic: Alert and oriented x 3. Cranial nerves III-XI within normal limits. Voice quality normal.  Cerebellar Function Tests:  Grossly normal    Audiogram: None today, tuning fork show normal responses after cleaning.    IMPRESSION AND PLAN:   1. Right cerumen impaction: Cleaned today under microscopic control and instruments.  Normal appearance after cleaning, no further treatment needed.  2. Left excessive cerumen: Cleaned today, no further treatment needed.    Patient prefers to continue to come in for six-month follow-ups.  We will see her at that time, sooner with any concerns or problems.    Thank you very much for the opportunity to participate in the care of your patient.    Rick L Nissen MD

## 2019-05-26 DIAGNOSIS — I25.10 CAD (CORONARY ARTERY DISEASE): ICD-10-CM

## 2019-05-26 RX ORDER — ISOSORBIDE MONONITRATE 60 MG/1
60 TABLET, EXTENDED RELEASE ORAL DAILY
Qty: 90 TABLET | Refills: 3 | Status: SHIPPED | OUTPATIENT
Start: 2019-05-26 | End: 2020-05-19

## 2019-05-26 NOTE — TELEPHONE ENCOUNTER
isosorbide mononitrate (IMDUR) 60 MG 24 hr tablet  Last Written Prescription Date:  2/26/19  Last Fill Quantity: 90,   # refills: 0  Last Office Visit : 5/14/19  Future Office visit: none

## 2019-09-04 NOTE — PROGRESS NOTES
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 She was treated with right lumpectomy, sentinel lymph node dissection which was negative for malignancy. This was performed July 2008. She was treated with adjuvant TCH Taxotere, carboplatin and Herceptin for six cycles ending December 2008. She completed a full year of Herceptin and is status post breast radiation therapy completed February 2009. She has been on Arimidex since March of 2009. She had fibrosis of the right breast surrounding skin erythema and lymphedema. Biopsy of the skin of the right breast performed 14 months ago showed no evidence of recurrence. Repeat biopsy performed 05/06/2011 showed skin with dermal chronic inflammation fibrosis, probable radiation changes and focal subcutaneous fat necrosis with no evidence of malignancy. The patient underwent head CT scan on Arelis 3 which showed thickening of the skin along the right breast, focal area of mild FTG activity within the right breast tissue which could be treatment related and an 8 x 5.8 lobulated mildly hypermetabolic lesion in continuity with vascular structures. Also of note was an area of FTG mild uptake along the chest wall under the right breast. A CT guided needle biopsy of this area was performed on 06/10/2011 and the biopsy pathology showed dense fibrosis consistent with scar, scant benign breast tissue, skeletal muscle and no evidence of malignancy. There was dermal chronic inflammation fibrosis, probable radiation changes and focal subcutaneous fat necrosis with no evidence of malignancy. The patient underwent head CT scan on Arelis 3 which showed thickening of the skin along the right breast, focal area of mild FTG activity within the right breast tissue which could be treatment related and an 8 x 5.8  lobulated mildly hypermetabolic lesion in continuity with vascular structures. Also of note was an area of FTG mild uptake along the chest wall under the right breast. A CT guided needle biopsy of this area was performed on 06/10/2011 and the biopsy pathology showed dense fibrosis consistent with scar, scant benign breast tissue, skeletal muscle and no evidence of malignancy.     SUMMARY OF DIAGNOSIS:  Stage I (T1 N0 M0) infiltrating ductal carcinoma of the right breast. She was originally diagnosed with a right ultrasound-guided biopsy in June of 2008. This revealed an infiltrating ductal carcinoma, Brave grade 2 of 3, ER-positive, HI-positive, HER2/alfredito 3+.     TREATMENT:  A.  She went on to receive a right lumpectomy with a sentinel lymph node dissection in July of 2008. [place] lymph nodes were negative for malignancy.   B.  She began adjuvant TCH (Taxotere, carboplatin, and Herceptin) and received 6 cycles of chemotherapy, completed in December of 2008.   C.  She then went on to complete a whole full year or Herceptin.   D.  She is status post breast radiation completed in February of 2009.   E.  She has been on Arimidex since March of 2009.  Stopped September, 2019.     FOLLOWUP NOTE      HISTORY OF PRESENT ILLNESS:  Shannon returns to clinic for routine followup.  She has no pain, no fatigue, no depression, no anxiety.  She is being followed by Cardiology at [facility] for aortic valve stenosis and may undergo a TAVR procedure within the next year or so.  She did have a mammogram in February that showed benign results.  She is interested in restarting Prolia.  She was not interested in continuing it back in 2017, but we will restart the Prolia because she has osteoporosis with a most valid negative T score of -2.4 on her last DEXA scan.  She is not taking calcium and vitamin D and I strongly advised her to go ahead and take it.       REVIEW OF SYSTEMS:  A 10-point review of systems is otherwise  negative.  She has no significant cardiovascular symptoms.      FAMILY HISTORY:  Shannon had a question about genetic testing because her mother did have breast cancer, first time in her 30s, second breast cancer in her 40s and therefore had bilateral breast cancer.  I do think that BRCA testing could potentially be valuable because Shannon has her ovaries in place.  Although she has no children, she does have a niece.      PHYSICAL EXAMINATION:   VITAL SIGNS:  Blood pressure 111/52, temperature not recorded, pulse 70, respirations 16, O2 sat 98% on room air, weight 66.2 kg and height 1.6 meters.  Pain score 0.   GENERAL:  Shannon appeared generally well.  She has no alopecia.   HEENT:  Oropharynx is without lesions.   LYMPH:  There is no palpable cervical, supraclavicular, subclavicular or axillary lymphadenopathy.   BREASTS:  Right breast reveals a well-healed incision at the 12-o'clock position above the nipple-areolar complex.  Nipple is everted.  No masses in the right breast and skin thickening and erythema have resolved.    Right axillary incision is well healed without erythema or masses.  Left breast is without masses and nipple is inverted.     LUNGS:  Clear to percussion and auscultation.   HEART:  Regular rate and rhythm, S1, S2. There was a III/VI systolic murmur at the LSB extending to the apex.   ABDOMEN:  Soft and nontender without hepatosplenomegaly.   EXTREMITIES:  Without edema.   PSYCHIATRIC:  Mood and affect were normal.      LABORATORY DATA:  The CBC and CMP were within normal limits, and specifically the calcium was normal.  DEXA scan 09/05/2019 is pending.      Mammogram 02/28/2019 was BI-RADS 2.      ASSESSMENT AND PLAN:     1.  Shannon Krishna is a 78-year-old woman with a history of stage I, [id number], infiltrating ductal carcinoma of the right breast, which was HER2 positive and hormone receptor positive.  She had skin thickening at the time of diagnosis and FDG uptake in the right breast,  perhaps related to fibrosis and edema rather than tumor.  She had primary therapy as detailed above.  She then went on to 10 years with a plan of 10 years of hormonal therapy.  She most recently has been on Arimidex.   2.  Adjuvant hormonal therapy.  Shannon has now completed 10 years of hormonal therapy in March.  Our plan is to discontinue the Arimidex at this time.  She is in agreement with this plan.   3.  Loss of bone density.  She did have 6 cycles of Zometa as well as Prolia for 1 year.  We decided to stop the Prolia in 08/2017 because of significant prior therapy with bone-targeted agents.  Nonetheless, her bone density has remained quite low, and she is willing to go back on Prolia and we will restart today.   4.  Bone health.  She will continue with calcium and vitamin D as well as weightbearing exercise.   5.  Exercise of 150 minutes per week was recommended.  She is not reaching that goal, but she is walking extensively and is very physically active.   6.  I recommended resuming the calcium and vitamin D.   7.  Cardiology followup at the [facility] for severe aortic stenosis.   8.  Followup.  We will see Shannon in followup in our clinic in 6 months on 02/27 with Prolia. Referral to survivors clinic.  Follow up mammogram in February, 2020.  Genetics consult. Follow up with Lizzy February 27, 2020 with CBC, CMP, tomomammogram, Prolia. Follow up in survivors clinic in February of 2021.     Thank you for allowing us to continue to participate in Shannon Krishna's care.      Elias Caal MD      [facility]     ADDENDUM:  DEXA shows most valid negative T score of -2.5.  I discussed that we recommend continuing with Prolia every 6 months.       I spent 35 minutes with the patient more than 50% of which was in counseling and coordination of care.

## 2019-09-05 ENCOUNTER — ONCOLOGY VISIT (OUTPATIENT)
Dept: ONCOLOGY | Facility: CLINIC | Age: 79
End: 2019-09-05
Attending: INTERNAL MEDICINE
Payer: MEDICARE

## 2019-09-05 ENCOUNTER — APPOINTMENT (OUTPATIENT)
Dept: LAB | Facility: CLINIC | Age: 79
End: 2019-09-05
Attending: INTERNAL MEDICINE
Payer: MEDICARE

## 2019-09-05 ENCOUNTER — ANCILLARY PROCEDURE (OUTPATIENT)
Dept: BONE DENSITY | Facility: CLINIC | Age: 79
End: 2019-09-05
Attending: PHYSICIAN ASSISTANT
Payer: MEDICARE

## 2019-09-05 VITALS
OXYGEN SATURATION: 98 % | HEART RATE: 70 BPM | DIASTOLIC BLOOD PRESSURE: 52 MMHG | HEIGHT: 65 IN | TEMPERATURE: 98 F | RESPIRATION RATE: 16 BRPM | BODY MASS INDEX: 24.32 KG/M2 | WEIGHT: 146 LBS | SYSTOLIC BLOOD PRESSURE: 111 MMHG

## 2019-09-05 DIAGNOSIS — Z79.899 ENCOUNTER FOR LONG-TERM (CURRENT) USE OF OTHER MEDICATIONS: ICD-10-CM

## 2019-09-05 DIAGNOSIS — C50.411 BREAST CANCER OF UPPER-OUTER QUADRANT OF RIGHT FEMALE BREAST (H): Primary | ICD-10-CM

## 2019-09-05 DIAGNOSIS — Z17.0 MALIGNANT NEOPLASM OF UPPER-OUTER QUADRANT OF RIGHT BREAST IN FEMALE, ESTROGEN RECEPTOR POSITIVE (H): ICD-10-CM

## 2019-09-05 DIAGNOSIS — Z79.811 USE OF AROMATASE INHIBITORS: ICD-10-CM

## 2019-09-05 DIAGNOSIS — M81.0 OSTEOPOROSIS, UNSPECIFIED OSTEOPOROSIS TYPE, UNSPECIFIED PATHOLOGICAL FRACTURE PRESENCE: ICD-10-CM

## 2019-09-05 DIAGNOSIS — C50.411 MALIGNANT NEOPLASM OF UPPER-OUTER QUADRANT OF RIGHT BREAST IN FEMALE, ESTROGEN RECEPTOR POSITIVE (H): ICD-10-CM

## 2019-09-05 PROBLEM — Z51.89 ENCOUNTER FOR OTHER SPECIFIED AFTERCARE: Status: ACTIVE | Noted: 2019-09-05

## 2019-09-05 LAB
ALBUMIN SERPL-MCNC: 3.5 G/DL (ref 3.4–5)
ALP SERPL-CCNC: 77 U/L (ref 40–150)
ALT SERPL W P-5'-P-CCNC: 21 U/L (ref 0–50)
ANION GAP SERPL CALCULATED.3IONS-SCNC: 5 MMOL/L (ref 3–14)
AST SERPL W P-5'-P-CCNC: 14 U/L (ref 0–45)
BASOPHILS # BLD AUTO: 0.1 10E9/L (ref 0–0.2)
BASOPHILS NFR BLD AUTO: 0.8 %
BILIRUB SERPL-MCNC: 0.4 MG/DL (ref 0.2–1.3)
BUN SERPL-MCNC: 15 MG/DL (ref 7–30)
CALCIUM SERPL-MCNC: 9 MG/DL (ref 8.5–10.1)
CHLORIDE SERPL-SCNC: 104 MMOL/L (ref 94–109)
CO2 SERPL-SCNC: 25 MMOL/L (ref 20–32)
CREAT SERPL-MCNC: 0.72 MG/DL (ref 0.52–1.04)
DIFFERENTIAL METHOD BLD: NORMAL
EOSINOPHIL # BLD AUTO: 0.1 10E9/L (ref 0–0.7)
EOSINOPHIL NFR BLD AUTO: 2.1 %
ERYTHROCYTE [DISTWIDTH] IN BLOOD BY AUTOMATED COUNT: 14.4 % (ref 10–15)
GFR SERPL CREATININE-BSD FRML MDRD: 80 ML/MIN/{1.73_M2}
GLUCOSE SERPL-MCNC: 76 MG/DL (ref 70–99)
HCT VFR BLD AUTO: 38.3 % (ref 35–47)
HGB BLD-MCNC: 12.5 G/DL (ref 11.7–15.7)
IMM GRANULOCYTES # BLD: 0 10E9/L (ref 0–0.4)
IMM GRANULOCYTES NFR BLD: 0.6 %
LYMPHOCYTES # BLD AUTO: 1.1 10E9/L (ref 0.8–5.3)
LYMPHOCYTES NFR BLD AUTO: 17.4 %
MCH RBC QN AUTO: 29.6 PG (ref 26.5–33)
MCHC RBC AUTO-ENTMCNC: 32.6 G/DL (ref 31.5–36.5)
MCV RBC AUTO: 91 FL (ref 78–100)
MONOCYTES # BLD AUTO: 0.5 10E9/L (ref 0–1.3)
MONOCYTES NFR BLD AUTO: 7.6 %
NEUTROPHILS # BLD AUTO: 4.7 10E9/L (ref 1.6–8.3)
NEUTROPHILS NFR BLD AUTO: 71.5 %
NRBC # BLD AUTO: 0 10*3/UL
NRBC BLD AUTO-RTO: 0 /100
PLATELET # BLD AUTO: 229 10E9/L (ref 150–450)
POTASSIUM SERPL-SCNC: 4.2 MMOL/L (ref 3.4–5.3)
PROT SERPL-MCNC: 6.4 G/DL (ref 6.8–8.8)
RBC # BLD AUTO: 4.23 10E12/L (ref 3.8–5.2)
SODIUM SERPL-SCNC: 135 MMOL/L (ref 133–144)
WBC # BLD AUTO: 6.6 10E9/L (ref 4–11)

## 2019-09-05 PROCEDURE — G0463 HOSPITAL OUTPT CLINIC VISIT: HCPCS | Mod: ZF

## 2019-09-05 PROCEDURE — 96372 THER/PROPH/DIAG INJ SC/IM: CPT | Mod: ZF

## 2019-09-05 PROCEDURE — 25000128 H RX IP 250 OP 636: Mod: ZF | Performed by: INTERNAL MEDICINE

## 2019-09-05 PROCEDURE — 85025 COMPLETE CBC W/AUTO DIFF WBC: CPT | Performed by: INTERNAL MEDICINE

## 2019-09-05 PROCEDURE — 99214 OFFICE O/P EST MOD 30 MIN: CPT | Mod: ZP | Performed by: INTERNAL MEDICINE

## 2019-09-05 PROCEDURE — 80053 COMPREHEN METABOLIC PANEL: CPT | Performed by: INTERNAL MEDICINE

## 2019-09-05 RX ORDER — METHYLPREDNISOLONE SODIUM SUCCINATE 125 MG/2ML
125 INJECTION, POWDER, LYOPHILIZED, FOR SOLUTION INTRAMUSCULAR; INTRAVENOUS
Status: DISCONTINUED | OUTPATIENT
Start: 2019-09-05 | End: 2019-09-05

## 2019-09-05 RX ORDER — DIPHENHYDRAMINE HYDROCHLORIDE 50 MG/ML
50 INJECTION INTRAMUSCULAR; INTRAVENOUS
Status: DISCONTINUED | OUTPATIENT
Start: 2019-09-05 | End: 2019-09-05

## 2019-09-05 RX ORDER — EPINEPHRINE 1 MG/ML
0.3 INJECTION, SOLUTION INTRAMUSCULAR; SUBCUTANEOUS EVERY 5 MIN PRN
Status: CANCELLED | OUTPATIENT
Start: 2019-09-05

## 2019-09-05 RX ORDER — NALOXONE HYDROCHLORIDE 0.4 MG/ML
.1-.4 INJECTION, SOLUTION INTRAMUSCULAR; INTRAVENOUS; SUBCUTANEOUS
Status: DISCONTINUED | OUTPATIENT
Start: 2019-09-05 | End: 2019-09-05

## 2019-09-05 RX ORDER — SODIUM CHLORIDE 9 MG/ML
1000 INJECTION, SOLUTION INTRAVENOUS CONTINUOUS PRN
Status: CANCELLED
Start: 2019-09-05

## 2019-09-05 RX ORDER — MEPERIDINE HYDROCHLORIDE 25 MG/ML
25 INJECTION INTRAMUSCULAR; INTRAVENOUS; SUBCUTANEOUS EVERY 30 MIN PRN
Status: DISCONTINUED | OUTPATIENT
Start: 2019-09-05 | End: 2019-09-05

## 2019-09-05 RX ORDER — ALBUTEROL SULFATE 0.83 MG/ML
2.5 SOLUTION RESPIRATORY (INHALATION)
Status: DISCONTINUED | OUTPATIENT
Start: 2019-09-05 | End: 2019-09-05

## 2019-09-05 RX ORDER — EPINEPHRINE 0.3 MG/.3ML
0.3 INJECTION SUBCUTANEOUS EVERY 5 MIN PRN
Status: DISCONTINUED | OUTPATIENT
Start: 2019-09-05 | End: 2019-09-05

## 2019-09-05 RX ORDER — SODIUM CHLORIDE 9 MG/ML
1000 INJECTION, SOLUTION INTRAVENOUS CONTINUOUS PRN
Status: DISCONTINUED | OUTPATIENT
Start: 2019-09-05 | End: 2019-09-05

## 2019-09-05 RX ORDER — ALBUTEROL SULFATE 90 UG/1
1-2 AEROSOL, METERED RESPIRATORY (INHALATION)
Status: CANCELLED
Start: 2019-09-05

## 2019-09-05 RX ORDER — EPINEPHRINE 1 MG/ML
0.3 INJECTION, SOLUTION INTRAMUSCULAR; SUBCUTANEOUS EVERY 5 MIN PRN
Status: DISCONTINUED | OUTPATIENT
Start: 2019-09-05 | End: 2019-09-05

## 2019-09-05 RX ORDER — DIPHENHYDRAMINE HYDROCHLORIDE 50 MG/ML
50 INJECTION INTRAMUSCULAR; INTRAVENOUS
Status: CANCELLED
Start: 2019-09-05

## 2019-09-05 RX ORDER — ALBUTEROL SULFATE 0.83 MG/ML
2.5 SOLUTION RESPIRATORY (INHALATION)
Status: CANCELLED | OUTPATIENT
Start: 2019-09-05

## 2019-09-05 RX ORDER — EPINEPHRINE 0.3 MG/.3ML
0.3 INJECTION SUBCUTANEOUS EVERY 5 MIN PRN
Status: CANCELLED | OUTPATIENT
Start: 2019-09-05

## 2019-09-05 RX ORDER — MEPERIDINE HYDROCHLORIDE 25 MG/ML
25 INJECTION INTRAMUSCULAR; INTRAVENOUS; SUBCUTANEOUS EVERY 30 MIN PRN
Status: CANCELLED | OUTPATIENT
Start: 2019-09-05

## 2019-09-05 RX ORDER — ALBUTEROL SULFATE 90 UG/1
1-2 AEROSOL, METERED RESPIRATORY (INHALATION)
Status: DISCONTINUED | OUTPATIENT
Start: 2019-09-05 | End: 2019-09-05

## 2019-09-05 RX ORDER — METHYLPREDNISOLONE SODIUM SUCCINATE 125 MG/2ML
125 INJECTION, POWDER, LYOPHILIZED, FOR SOLUTION INTRAMUSCULAR; INTRAVENOUS
Status: CANCELLED
Start: 2019-09-05

## 2019-09-05 RX ADMIN — DENOSUMAB 60 MG: 60 INJECTION SUBCUTANEOUS at 11:35

## 2019-09-05 ASSESSMENT — PAIN SCALES - GENERAL: PAINLEVEL: NO PAIN (0)

## 2019-09-05 ASSESSMENT — MIFFLIN-ST. JEOR: SCORE: 1148.13

## 2019-09-05 NOTE — LETTER
9/5/2019       RE: Shannon Krishna  [address]  [facility] [address]     Dear Colleague,    Thank you for referring your patient, Shannon Krishna, to the [facility]. Please see a copy of my visit note below.    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 She was treated with right lumpectomy, sentinel lymph node dissection which was negative for malignancy. This was performed July 2008. She was treated with adjuvant H Taxotere, carboplatin and Herceptin for six cycles ending December 2008. She completed a full year of Herceptin and is status post breast radiation therapy completed February 2009. She has been on Arimidex since March of 2009. She had fibrosis of the right breast surrounding skin erythema and lymphedema. Biopsy of the skin of the right breast performed 14 months ago showed no evidence of recurrence. Repeat biopsy performed 05/06/2011 showed skin with dermal chronic inflammation fibrosis, probable radiation changes and focal subcutaneous fat necrosis with no evidence of malignancy. The patient underwent head CT scan on Arelis 3 which showed thickening of the skin along the right breast, focal area of mild FTG activity within the right breast tissue which could be treatment related and an 8 x 5.8 lobulated mildly hypermetabolic lesion in continuity with vascular structures. Also of note was an area of FTG mild uptake along the chest wall under the right breast. A CT guided needle biopsy of this area was performed on 06/10/2011 and the biopsy pathology showed dense fibrosis consistent with scar, scant benign breast tissue, skeletal muscle and no evidence of malignancy. There was dermal chronic inflammation fibrosis, probable radiation changes and focal subcutaneous fat necrosis  with no evidence of malignancy. The patient underwent head CT scan on Arelis 3 which showed thickening of the skin along the right breast, focal area of mild FTG activity within the right breast tissue which could be treatment related and an 8 x 5.8 lobulated mildly hypermetabolic lesion in continuity with vascular structures. Also of note was an area of FTG mild uptake along the chest wall under the right breast. A CT guided needle biopsy of this area was performed on 06/10/2011 and the biopsy pathology showed dense fibrosis consistent with scar, scant benign breast tissue, skeletal muscle and no evidence of malignancy.     SUMMARY OF DIAGNOSIS:  Stage I (T1 N0 M0) infiltrating ductal carcinoma of the right breast. She was originally diagnosed with a right ultrasound-guided biopsy in June of 2008. This revealed an infiltrating ductal carcinoma, Brijesh grade 2 of 3, ER-positive, FL-positive, HER2/alfredito 3+.     TREATMENT:  A.  She went on to receive a right lumpectomy with a sentinel lymph node dissection in July of 2008. [place] lymph nodes were negative for malignancy.   B.  She began adjuvant TCH (Taxotere, carboplatin, and Herceptin) and received 6 cycles of chemotherapy, completed in December of 2008.   C.  She then went on to complete a whole full year or Herceptin.   D.  She is status post breast radiation completed in February of 2009.   E.  She has been on Arimidex since March of 2009.  Stopped September, 2019.     FOLLOWUP NOTE      HISTORY OF PRESENT ILLNESS:  Shannon returns to clinic for routine followup.  She has no pain, no fatigue, no depression, no anxiety.  She is being followed by Cardiology at [facility] for aortic valve stenosis and may undergo a TAVR procedure within the next year or so.  She did have a mammogram in February that showed benign results.  She is interested in restarting Prolia.  She was not interested in continuing it back in 2017, but we will restart the Prolia because she has  osteoporosis with a most valid negative T score of -2.4 on her last DEXA scan.  She is not taking calcium and vitamin D and I strongly advised her to go ahead and take it.       REVIEW OF SYSTEMS:  A 10-point review of systems is otherwise negative.  She has no significant cardiovascular symptoms.      FAMILY HISTORY:  Shannon had a question about genetic testing because her mother did have breast cancer, first time in her 30s, second breast cancer in her 40s and therefore had bilateral breast cancer.  I do think that BRCA testing could potentially be valuable because Shannon has her ovaries in place.  Although she has no children, she does have a niece.      PHYSICAL EXAMINATION:   VITAL SIGNS:  Blood pressure 111/52, temperature not recorded, pulse 70, respirations 16, O2 sat 98% on room air, weight 66.2 kg and height 1.6 meters.  Pain score 0.   GENERAL:  Shannon appeared generally well.  She has no alopecia.   HEENT:  Oropharynx is without lesions.   LYMPH:  There is no palpable cervical, supraclavicular, subclavicular or axillary lymphadenopathy.   BREASTS:  Right breast reveals a well-healed incision at the 12-o'clock position above the nipple-areolar complex.  Nipple is everted.  No masses in the right breast and skin thickening and erythema have resolved.    Right axillary incision is well healed without erythema or masses.  Left breast is without masses and nipple is inverted.     LUNGS:  Clear to percussion and auscultation.   HEART:  Regular rate and rhythm, S1, S2. There was a III/VI systolic murmur at the LSB extending to the apex.   ABDOMEN:  Soft and nontender without hepatosplenomegaly.   EXTREMITIES:  Without edema.   PSYCHIATRIC:  Mood and affect were normal.      LABORATORY DATA:  The CBC and CMP were within normal limits, and specifically the calcium was normal.  DEXA scan 09/05/2019 is pending.      Mammogram 02/28/2019 was BI-RADS 2.      ASSESSMENT AND PLAN:     1.  Shannon Krishna is a  78-year-old woman with a history of stage I, [id number], infiltrating ductal carcinoma of the right breast, which was HER2 positive and hormone receptor positive.  She had skin thickening at the time of diagnosis and FDG uptake in the right breast, perhaps related to fibrosis and edema rather than tumor.  She had primary therapy as detailed above.  She then went on to 10 years with a plan of 10 years of hormonal therapy.  She most recently has been on Arimidex.   2.  Adjuvant hormonal therapy.  Shannon has now completed 10 years of hormonal therapy in March.  Our plan is to discontinue the Arimidex at this time.  She is in agreement with this plan.   3.  Loss of bone density.  She did have 6 cycles of Zometa as well as Prolia for 1 year.  We decided to stop the Prolia in 08/2017 because of significant prior therapy with bone-targeted agents.  Nonetheless, her bone density has remained quite low, and she is willing to go back on Prolia and we will restart today.   4.  Bone health.  She will continue with calcium and vitamin D as well as weightbearing exercise.   5.  Exercise of 150 minutes per week was recommended.  She is not reaching that goal, but she is walking extensively and is very physically active.   6.  I recommended resuming the calcium and vitamin D.   7.  Cardiology followup at the [facility] for severe aortic stenosis.   8.  Followup.  We will see Shannon in followup in our clinic in 6 months on 02/27. Referral to survivors clinic.  Follow up mammogram in February, 2020.  Genetics consult. Follow up with Lizzy February 27, 2020 with CBC, CMP, tomomammogram. Follow up in survivors clinic in February of 2021.     Thank you for allowing us to continue to participate in Shannon Krishna's care.      Elias Caal MD      [facility]     I spent 35 minutes with the patient more than 50% of which was in counseling and coordination of care.

## 2019-09-05 NOTE — NURSING NOTE
"Oncology Rooming Note    September 5, 2019 10:00 AM   Shannon Krishna is a 78 year old female who presents for:    Chief Complaint   Patient presents with     Blood Draw     Labs drawn via  by RN in lab. VS taken.      RECHECK     onc Breast cancer of upper-outer quadrant of right female breast      Initial Vitals: /52 (BP Location: Right arm, Patient Position: Sitting, Cuff Size: Adult Regular)   Pulse 70   Temp 98  F (36.7  C) (Oral)   Resp 16   Ht 1.651 m (5' 5\")   Wt 66.2 kg (146 lb)   SpO2 98%   BMI 24.30 kg/m   Estimated body mass index is 24.3 kg/m  as calculated from the following:    Height as of this encounter: 1.651 m (5' 5\").    Weight as of this encounter: 66.2 kg (146 lb). Body surface area is 1.74 meters squared.  No Pain (0) Comment: Data Unavailable   No LMP recorded. Patient is postmenopausal.  Allergies reviewed: Yes  Medications reviewed: Yes    Medications: Medication refills not needed today.  Pharmacy name entered into [employer] [facility]: [facility] - [place], [place] - [address] [place] [address]    Clinical concerns: none        Maida Armani, CMA              "

## 2019-09-05 NOTE — NURSING NOTE
Chief Complaint   Patient presents with     Blood Draw     Labs drawn via  by RN in lab. VS taken.      Destin Rodriguez RN

## 2019-09-06 NOTE — TELEPHONE ENCOUNTER
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 No, Internal    Has patient been seen for any external appt for this diagnosis? No    If yes, where? NA    Has patient had any imaging or procedures outside of [facility] for this condition? No      If Yes, where? NA    ADDITIONAL INFORMATION:

## 2019-10-03 ENCOUNTER — HEALTH MAINTENANCE LETTER (OUTPATIENT)
Age: 79
End: 2019-10-03

## 2019-11-26 ENCOUNTER — OFFICE VISIT (OUTPATIENT)
Dept: OTOLARYNGOLOGY | Facility: CLINIC | Age: 79
End: 2019-11-26
Payer: MEDICARE

## 2019-11-26 VITALS — BODY MASS INDEX: 24.66 KG/M2 | HEIGHT: 65 IN | WEIGHT: 148 LBS

## 2019-11-26 DIAGNOSIS — H61.21 IMPACTED CERUMEN OF RIGHT EAR: Primary | ICD-10-CM

## 2019-11-26 DIAGNOSIS — H61.22 EXCESSIVE CERUMEN IN EAR CANAL, LEFT: ICD-10-CM

## 2019-11-26 ASSESSMENT — PAIN SCALES - GENERAL: PAINLEVEL: NO PAIN (0)

## 2019-11-26 ASSESSMENT — MIFFLIN-ST. JEOR: SCORE: 1157.2

## 2019-11-26 NOTE — NURSING NOTE
"Chief Complaint   Patient presents with     RECHECK     6 month follow-up, ear cleaning     Height 1.651 m (5' 5\"), weight 67.1 kg (148 lb), not currently breastfeeding.    Cindy Costa, EMT  "

## 2019-11-26 NOTE — LETTER
11/26/2019       RE: Shannon Krishna  [address]  [facility] [address]       Dear Valeria Reyez:    I had the pleasure of seeing Shannon Krishna in followup today at the [facility].    CHIEF COMPLAINT: Ears    HISTORY OF PRESENT ILLNESS: Patient is a 78-year-old in today for follow-up from her last visit in May.  She is a longtime patient of Dr. Valenzuela and comes in every 6 months for cleanings.  She has very small ear canal and has had impactions frequently in the past.  On her follow-up today, she has not had any pain or drainage.  She does feel her hearing is muffled more so on the right side.  She denies any dizziness concerns.  There is no dysphagia, hoarseness, facial paresthesias.  He does have one hearing aid.    MEDICATIONS: Please refer to the detailed medication reconciliation performed by my nurse today, which I have reviewed and signed.     ALLERGIES:    Allergies   Allergen Reactions     Nkda [No Known Drug Allergies]        HABITS: Social History    Substance and Sexual Activity      Alcohol use: No     History   Smoking Status     Former Smoker     Packs/day: 1.00     Years: 30.00     Types: Cigarettes     Start date: 1/1/1960     Quit date: 1/1/1990   Smokeless Tobacco     Never Used         PAST MEDICAL HISTORY:  Please see today's intake form (for the remainder of the PMH) which I reviewed and signed.  Past Medical History:   Diagnosis Date     Adenocarcinoma of breast (H)      Adenomatous polyp of colon 2003     Aortic sclerosis     Mild to moderate sclerosis without significant stenosis.     Arthritis 2015     Coronary artery disease     Multivessel CAD,  of the mid RCA     Esophageal reflux      Hearing problem 2010     History of radiation therapy 2008     Hyperlipidemia      Hypertension      Osteoporosis        FAMILY HISTORY/SOCIAL HISTORY:    Family History   Problem Relation Age of Onset     Family History  Negative Other      Cancer Mother      Breast Cancer Mother      Diabetes Father      Heart Disease Father      Cerebrovascular Disease Father         ,, ,, ,, ,, ,, ,, ,, ,     Heart Disease Brother      Hyperlipidemia Brother      Heart Disease Brother      Diabetes Other      Glaucoma No family hx of      Macular Degeneration No family hx of      Amblyopia No family hx of      Coronary Artery Disease No family hx of       Social History     Socioeconomic History     Marital status: Legally      Spouse name: Not on file     Number of children: Not on file     Years of education: Not on file     Highest education level: Not on file   Occupational History     Not on file   Social Needs     Financial resource strain: Not on file     Food insecurity:     Worry: Not on file     Inability: Not on file     Transportation needs:     Medical: Not on file     Non-medical: Not on file   Tobacco Use     Smoking status: Former Smoker     Packs/day: 1.00     Years: 30.00     Pack years: 30.00     Types: Cigarettes     Start date: 1960     Last attempt to quit: 1990     Years since quittin.9     Smokeless tobacco: Never Used   Substance and Sexual Activity     Alcohol use: No     Drug use: No     Sexual activity: Not Currently     Partners: Male     Birth control/protection: None   Lifestyle     Physical activity:     Days per week: Not on file     Minutes per session: Not on file     Stress: Not on file   Relationships     Social connections:     Talks on phone: Not on file     Gets together: Not on file     Attends Caodaism service: Not on file     Active member of club or organization: Not on file     Attends meetings of clubs or organizations: Not on file     Relationship status: Not on file     Intimate partner violence:     Fear of current or ex partner: Not on file     Emotionally abused: Not on file     Physically abused: Not on file     Forced sexual activity: Not on file   Other Topics Concern      Parent/sibling w/ CABG, MI or angioplasty before 65F 55M? Not Asked   Social History Narrative     Not on file       REVIEW OF SYSTEMS: Patient Supplied Answers to Review of Systems   ENT ROS 11/2/2018   Ears, Nose, Throat Hearing loss   Musculoskeletal -            The remainder of the 10 point ROS is negative    PHYSICIAL EXAMINATION:  Constitutional: The patient was well-groomed and in no acute distress.   Skin: Warm and pink.  Psychiatric: The patient's affect was calm, cooperative, and appropriate.   Respiratory: Breathing comfortably without stridor or exertion of accessory muscles.  Eyes: Pupils were equal and reactive. Extraocular movement intact.   Head: Normocephalic and atraumatic. No lesions or scars.  Ears: Both ears examined she does have considerable cerumen bilaterally.  She is placed on the microscope and under high-power magnification the right side initially was evaluated.  She has a very dense impacted cerumen.  This was cleaned using right angled hook to mobilize the cerumen and curettes as well as alligator forceps.  Suction also used.  After cleaning, TM finally visualized and shows good position in color.  She again has very small ear canal.  Left ear was cleaned of excessive cerumen, after removal using curette and suction, TM and middle ear look normal.    Nose: Sinuses were nontender. Anterior rhinoscopy revealed midline septum and absence of purulence or polyps.  Oral Cavity: Normal tongue, floor of mouth, buccal mucosa, and palate. No abnormal lymph tissue in the oropharynx.   Neck: The parotid is soft without masses. Supple with normal laryngeal and tracheal landmarks.   Lymphatic: There is no palpable lymphadenopathy or other masses in the neck.   Neurologic: Alert and oriented x 3. Cranial nerves III-XI within normal limits. Voice quality normal.  Cerebellar Function Tests:  Grossly normal    Audiogram: Tuning forks normal after cleaning    IMPRESSION AND PLAN:   1. Right  cerumen impaction: Cleaned today, no further treatment needed, monitor.  2. Left excessive cerumen: Cleaned today, no further treatment needed, monitor    Thank you very much for the opportunity to participate in the care of your patient.    Rick L Nissen MD

## 2019-11-26 NOTE — PATIENT INSTRUCTIONS
1.  You were seen in the ENT Clinic today by Dr. Nissen.  If you have any questions or concerns after your appointment, please call [phone]. Press option #1 for scheduling related needs. Press option #3 for Nurse advice.    2.  Plan is to return to clinic in 6 months, or sooner with any concerns.      Sandra Macedo LPN  [facility]  [phone]

## 2019-11-26 NOTE — PROGRESS NOTES
Dear Valeria Reyez:    I had the pleasure of seeing Shannon Krishna in followup today at the [facility].    CHIEF COMPLAINT: Ears    HISTORY OF PRESENT ILLNESS: Patient is a 78-year-old in today for follow-up from her last visit in May.  She is a longtime patient of Dr. Valenzuela and comes in every 6 months for cleanings.  She has very small ear canal and has had impactions frequently in the past.  On her follow-up today, she has not had any pain or drainage.  She does feel her hearing is muffled more so on the right side.  She denies any dizziness concerns.  There is no dysphagia, hoarseness, facial paresthesias.  He does have one hearing aid.    MEDICATIONS: Please refer to the detailed medication reconciliation performed by my nurse today, which I have reviewed and signed.     ALLERGIES:    Allergies   Allergen Reactions     Nkda [No Known Drug Allergies]        HABITS: Social History    Substance and Sexual Activity      Alcohol use: No     History   Smoking Status     Former Smoker     Packs/day: 1.00     Years: 30.00     Types: Cigarettes     Start date: 1/1/1960     Quit date: 1/1/1990   Smokeless Tobacco     Never Used         PAST MEDICAL HISTORY:  Please see today's intake form (for the remainder of the PMH) which I reviewed and signed.  Past Medical History:   Diagnosis Date     Adenocarcinoma of breast (H)      Adenomatous polyp of colon 2003     Aortic sclerosis     Mild to moderate sclerosis without significant stenosis.     Arthritis 2015     Coronary artery disease     Multivessel CAD,  of the mid RCA     Esophageal reflux      Hearing problem 2010     History of radiation therapy 2008     Hyperlipidemia      Hypertension      Osteoporosis        FAMILY HISTORY/SOCIAL HISTORY:    Family History   Problem Relation Age of Onset     Family History Negative Other      Cancer Mother      Breast Cancer Mother      Diabetes Father      Heart Disease  Father      Cerebrovascular Disease Father         ,, ,, ,, ,, ,, ,, ,, ,     Heart Disease Brother      Hyperlipidemia Brother      Heart Disease Brother      Diabetes Other      Glaucoma No family hx of      Macular Degeneration No family hx of      Amblyopia No family hx of      Coronary Artery Disease No family hx of       Social History     Socioeconomic History     Marital status: Legally      Spouse name: Not on file     Number of children: Not on file     Years of education: Not on file     Highest education level: Not on file   Occupational History     Not on file   Social Needs     Financial resource strain: Not on file     Food insecurity:     Worry: Not on file     Inability: Not on file     Transportation needs:     Medical: Not on file     Non-medical: Not on file   Tobacco Use     Smoking status: Former Smoker     Packs/day: 1.00     Years: 30.00     Pack years: 30.00     Types: Cigarettes     Start date: 1960     Last attempt to quit: 1990     Years since quittin.9     Smokeless tobacco: Never Used   Substance and Sexual Activity     Alcohol use: No     Drug use: No     Sexual activity: Not Currently     Partners: Male     Birth control/protection: None   Lifestyle     Physical activity:     Days per week: Not on file     Minutes per session: Not on file     Stress: Not on file   Relationships     Social connections:     Talks on phone: Not on file     Gets together: Not on file     Attends Nondenominational service: Not on file     Active member of club or organization: Not on file     Attends meetings of clubs or organizations: Not on file     Relationship status: Not on file     Intimate partner violence:     Fear of current or ex partner: Not on file     Emotionally abused: Not on file     Physically abused: Not on file     Forced sexual activity: Not on file   Other Topics Concern     Parent/sibling w/ CABG, MI or angioplasty before 65F 55M? Not Asked   Social History Narrative      Not on file       REVIEW OF SYSTEMS: Patient Supplied Answers to Review of Systems   ENT ROS 11/2/2018   Ears, Nose, Throat Hearing loss   Musculoskeletal -            The remainder of the 10 point ROS is negative    PHYSICIAL EXAMINATION:  Constitutional: The patient was well-groomed and in no acute distress.   Skin: Warm and pink.  Psychiatric: The patient's affect was calm, cooperative, and appropriate.   Respiratory: Breathing comfortably without stridor or exertion of accessory muscles.  Eyes: Pupils were equal and reactive. Extraocular movement intact.   Head: Normocephalic and atraumatic. No lesions or scars.  Ears: Both ears examined she does have considerable cerumen bilaterally.  She is placed on the microscope and under high-power magnification the right side initially was evaluated.  She has a very dense impacted cerumen.  This was cleaned using right angled hook to mobilize the cerumen and curettes as well as alligator forceps.  Suction also used.  After cleaning, TM finally visualized and shows good position in color.  She again has very small ear canal.  Left ear was cleaned of excessive cerumen, after removal using curette and suction, TM and middle ear look normal.    Nose: Sinuses were nontender. Anterior rhinoscopy revealed midline septum and absence of purulence or polyps.  Oral Cavity: Normal tongue, floor of mouth, buccal mucosa, and palate. No abnormal lymph tissue in the oropharynx.   Neck: The parotid is soft without masses. Supple with normal laryngeal and tracheal landmarks.   Lymphatic: There is no palpable lymphadenopathy or other masses in the neck.   Neurologic: Alert and oriented x 3. Cranial nerves III-XI within normal limits. Voice quality normal.  Cerebellar Function Tests:  Grossly normal    Audiogram: Tuning forks normal after cleaning    IMPRESSION AND PLAN:   1. Right cerumen impaction: Cleaned today, no further treatment needed, monitor.  2. Left excessive cerumen: Cleaned  today, no further treatment needed, monitor    Thank you very much for the opportunity to participate in the care of your patient.    Rick L Nissen MD

## 2020-01-20 ENCOUNTER — OFFICE VISIT (OUTPATIENT)
Dept: ONCOLOGY | Facility: CLINIC | Age: 80
End: 2020-01-20
Attending: INTERNAL MEDICINE
Payer: MEDICARE

## 2020-01-20 ENCOUNTER — PRE VISIT (OUTPATIENT)
Dept: ONCOLOGY | Facility: CLINIC | Age: 80
End: 2020-01-20

## 2020-01-20 DIAGNOSIS — Z85.3 PERSONAL HISTORY OF MALIGNANT NEOPLASM OF BREAST: ICD-10-CM

## 2020-01-20 DIAGNOSIS — Z85.3 PERSONAL HISTORY OF MALIGNANT NEOPLASM OF BREAST: Primary | ICD-10-CM

## 2020-01-20 DIAGNOSIS — Z80.3 FAMILY HISTORY OF BREAST CANCER: ICD-10-CM

## 2020-01-20 LAB — MISCELLANEOUS TEST: NORMAL

## 2020-01-20 PROCEDURE — 96040 ZZH GENETIC COUNSELING, EACH 30 MINUTES: CPT | Mod: ZF | Performed by: GENETIC COUNSELOR, MS

## 2020-01-20 NOTE — LETTER
Cancer Risk Management  Program Locations    [employer] [facility]  [facility]  Mailing Address  Cancer Risk Management Program  [facility]  [address] [place] [address]    New patient appointments  [phone]  January 20, 2020    Shannon Krishna  [address]   [facility] [address]      Dear Shannon,    It was a pleasure meeting with you at the [facility] on January 20, 2020. Here is a copy of the progress note from your recent genetic counseling visit to the Cancer Risk Management Program. If you have any additional questions, please feel free to call.    1/20/2020    Referring Provider: Elias Caal MD    Presenting Information:   I met with Shannon Krishna today for genetic counseling at the Cancer Risk Management Program at the [facility] to discuss her personal and family history of breast cancer. She is here today to review this history, cancer screening recommendations, and available genetic testing options.    Personal History:  Shannon is a 79 year old female. 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 Treatment included a lumpectomy, chemotherapy, radiation, and endocrine therapy.      Shannon has her ovaries, fallopian tubes and uterus in place, and she has had no ovarian cancer screening to date. She has annual clinical breast exams and mammograms; her most recent mammogram in February 2019 was normal. Her most recent colonoscopy in 2015 was normal and she believes follow-up was recommended in 10 years. She reports that an earlier colonoscopy identified two pre-cancerous polyps, but a copy of the report was not available today. She does not regularly do any other cancer screening at this  time.     Family History: (Please see scanned pedigree for detailed family history information)    Shannon's mother was first diagnosed with breast cancer at age 39 and treated with a mastectomy. She was then diagnosed with a new primary breast cancer at age 49, that was also treated with a mastectomy. She passed away at age 69 from metastatic disease, though Shannon was unsure if it was related to her mother's prior breast cancers or a new cancer.    One maternal aunt was diagnosed with an unknown cancer in her 60's and passed away at age 70.    Shannon reports that she has no known paternal family history of cancer.    Her maternal ethnicity is I[place]ian, English, and Nigerian. Her paternal ethnicity is [place]ian. There is no known Ashkenazi Confucianist ancestry on either side of her family.    Discussion:    Shannon's personal and family history of breast cancer is suggestive of a hereditary cancer syndrome.    We reviewed the features of sporadic, familial, and hereditary cancers. In looking at Shannon's family history, it is possible that a cancer susceptibility gene is present as Shannon and her mother were both diagnosed with breast cancer; her mother was also diagnosed under age 50 and had at least two primary cancers.    We discussed the natural history and genetics of Hereditary Breast and Ovarian Cancer (HBOC) syndrome.     We reviewed that the most common cause of hereditary breast cancer is HBOC syndrome, which is caused by mutations in the BRCA1 and BRCA2 genes. Individuals with HBOC syndrome are at increased risk for several different cancers, including breast, ovarian, male breast, prostate, melanoma, and pancreatic cancer.    Based on her personal and family history, Shannon meets current National Comprehensive Cancer Network (NCCN) criteria for genetic testing of BRCA1 and BRCA2.    A detailed handout regarding HBOC syndrome and the information we discussed was provided to Shannon at the end of our appointment  today and can be found in the after visit summary. Topics included: inheritance pattern, cancer risks, cancer screening recommendations, and also risks, benefits and limitations of testing.    We discussed that there are additional genes that could cause increased risk for breast cancer. As many of these genes present with overlapping features in a family and accurate cancer risk cannot always be established based upon the pedigree analysis alone, it would be reasonable for Shannon to consider panel genetic testing to analyze multiple genes at once.    We reviewed genetic testing options for hereditary breast and related cancers: actionable high/moderate risk custom panel (CustomNext-Cancer, 19 genes) and expanded high and moderate risk panel (OvaNext, 25 genes). Shannon expressed interest in only the actionable genes. She opted for the CustomNext-Cancer 19 gene panel.    The CustomNext-Cancer high/moderate risk breast and related cancers panel includes the following 19 genes: DARSHAN, BRCA1, BRCA2, BRIP1, CDH1, CHEK2, EPCAM, MLH1, MSH2, MSH6, NBN, NF1, PALB2, PMS2, PTEN, RAD51C, RAD51D, STK11, and TP53. This custom panel is a combination of the BRCAplus panel, GYNplus panel, and the NBN, NF1, and STK11 genes.    Some of the genes on this custom panel are associated with specific hereditary cancer syndromes and have published management guidelines: Hereditary Breast and Ovarian Cancer syndrome (BRCA1, BRCA2), Londono syndrome (EPCAM, MLH1, MSH2, MSH6, PMS2), Hereditary Diffuse Gastric Cancer (CDH1), Cowden Syndrome (PTEN), Peutz-Jeghers syndrome, Neurofibromatosis type 1 (NF1), and Li Fraumeni syndrome (TP53).       Risk-reducing salpingo-oophorectomy can be considered in women with mutations in BRIP1, RAD51C, or RAD51D. DARSHAN, CHEK2, NBN, and PALB2 are associated with breast cancer risk and have published screening guidelines.    Consent was obtained and genetic testing using the CustomNext-Cancer panel was sent to [facility]. Turn around time: 3-4 weeks.     Medical Management: For Shannon, we reviewed that the information from genetic testing may determine:    additional cancer screening for which Shannon may qualify (i.e. mammogram and breast MRI, more frequent colonoscopies, more frequent dermatologic exams, etc.),    options for risk reducing surgeries Shannon could consider (i.e. bilateral mastectomy, surgery to remove her ovaries and/or uterus, etc.),      and targeted chemotherapies if she were to develop certain cancers in the future (i.e. immunotherapy for individuals with Londono syndrome, PARP inhibitors, etc.).     These recommendations and possible targeted chemotherapies will be discussed in detail once genetic testing is completed.     Plan:  1) Today Shannon elected to proceed with genetic testing using the CustomNext-Cancer panel offered by [facility].  2) This information should be available in 3-4 weeks.  3) Shannon will be called to discuss the results.    Face to face time: 40 minutes    Asuncion Velasquez MS, [facility]  Licensed Genetic Counselor  Office: [phone]  Pager: [phone]

## 2020-01-20 NOTE — NURSING NOTE
Chief Complaint   Patient presents with     Lab Only     VPT blood draw for genetics only from right arm

## 2020-01-20 NOTE — PROGRESS NOTES
1/20/2020    Referring Provider: Elias Caal MD    Presenting Information:   I met with Shannon Krishna today for genetic counseling at the Cancer Risk Management Program at the [facility] to discuss her personal and family history of breast cancer. She is here today to review this history, cancer screening recommendations, and available genetic testing options.    Personal History:  Shannon is a 79 year old female. 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 Treatment included a lumpectomy, chemotherapy, radiation, and endocrine therapy.      Shannon has her ovaries, fallopian tubes and uterus in place, and she has had no ovarian cancer screening to date. She has annual clinical breast exams and mammograms; her most recent mammogram in February 2019 was normal. Her most recent colonoscopy in 2015 was normal and she believes follow-up was recommended in 10 years. She reports that an earlier colonoscopy identified two pre-cancerous polyps, but a copy of the report was not available today. She does not regularly do any other cancer screening at this time.     Family History: (Please see scanned pedigree for detailed family history information)    Shannon's mother was first diagnosed with breast cancer at age 39 and treated with a mastectomy. She was then diagnosed with a new primary breast cancer at age 49, that was also treated with a mastectomy. She passed away at age 69 from metastatic disease, though Shannon was unsure if it was related to her mother's prior breast cancers or a new cancer.    One maternal aunt was diagnosed with an unknown cancer in her 60's and passed away at age 70.    Shannon reports that she has no known paternal family history of cancer.    Her maternal ethnicity is M[place], English, and Northern [place]. Her paternal ethnicity is Malian. There is no known Ashkenazi Anabaptist ancestry on either side of her  family.    Discussion:    Shannon's personal and family history of breast cancer is suggestive of a hereditary cancer syndrome.    We reviewed the features of sporadic, familial, and hereditary cancers. In looking at Shannon's family history, it is possible that a cancer susceptibility gene is present as Shannon and her mother were both diagnosed with breast cancer; her mother was also diagnosed under age 50 and had at least two primary cancers.    We discussed the natural history and genetics of Hereditary Breast and Ovarian Cancer (HBOC) syndrome.     We reviewed that the most common cause of hereditary breast cancer is HBOC syndrome, which is caused by mutations in the BRCA1 and BRCA2 genes. Individuals with HBOC syndrome are at increased risk for several different cancers, including breast, ovarian, male breast, prostate, melanoma, and pancreatic cancer.    Based on her personal and family history, Shannon meets current National Comprehensive Cancer Network (NCCN) criteria for genetic testing of BRCA1 and BRCA2.    A detailed handout regarding HBOC syndrome and the information we discussed was provided to Shannon at the end of our appointment today and can be found in the after visit summary. Topics included: inheritance pattern, cancer risks, cancer screening recommendations, and also risks, benefits and limitations of testing.    We discussed that there are additional genes that could cause increased risk for breast cancer. As many of these genes present with overlapping features in a family and accurate cancer risk cannot always be established based upon the pedigree analysis alone, it would be reasonable for Shannon to consider panel genetic testing to analyze multiple genes at once.    We reviewed genetic testing options for hereditary breast and related cancers: actionable high/moderate risk custom panel (CustomNext-Cancer, 19 genes) and expanded high and moderate risk panel (OvaNext, 25 genes). Shannon  expressed interest in only the actionable genes. She opted for the CustomNext-Cancer 19 gene panel.    The CustomNext-Cancer high/moderate risk breast and related cancers panel includes the following 19 genes: DARSHAN, BRCA1, BRCA2, BRIP1, CDH1, CHEK2, EPCAM, MLH1, MSH2, MSH6, NBN, NF1, PALB2, PMS2, PTEN, RAD51C, RAD51D, STK11, and TP53. This custom panel is a combination of the BRCAplus panel, GYNplus panel, and the NBN, NF1, and STK11 genes.    Some of the genes on this custom panel are associated with specific hereditary cancer syndromes and have published management guidelines: Hereditary Breast and Ovarian Cancer syndrome (BRCA1, BRCA2), Londono syndrome (EPCAM, MLH1, MSH2, MSH6, PMS2), Hereditary Diffuse Gastric Cancer (CDH1), Cowden Syndrome (PTEN), Peutz-Jeghers syndrome, Neurofibromatosis type 1 (NF1), and Li Fraumeni syndrome (TP53).       Risk-reducing salpingo-oophorectomy can be considered in women with mutations in BRIP1, RAD51C, or RAD51D. DARSHAN, CHEK2, NBN, and PALB2 are associated with breast cancer risk and have published screening guidelines.    Consent was obtained and genetic testing using the CustomNext-Cancer panel was sent to [facility]. Turn around time: 3-4 weeks.     Medical Management: For Shannon, we reviewed that the information from genetic testing may determine:    additional cancer screening for which Shannon may qualify (i.e. mammogram and breast MRI, more frequent colonoscopies, more frequent dermatologic exams, etc.),    options for risk reducing surgeries Shannon could consider (i.e. bilateral mastectomy, surgery to remove her ovaries and/or uterus, etc.),      and targeted chemotherapies if she were to develop certain cancers in the future (i.e. immunotherapy for individuals with Londono syndrome, PARP inhibitors, etc.).     These recommendations and possible targeted chemotherapies will be discussed in detail once genetic testing is completed.     Plan:  1) Today Shannon  elected to proceed with genetic testing using the CustomNext-Cancer panel offered by [employer]  2) This information should be available in 3-4 weeks.  3) Hsannon will be called to discuss the results.    Face to face time: 40 minutes    Asuncion Velasquez MS, [facility]  Licensed Genetic Counselor  Office: [phone]  Pager: [phone]

## 2020-01-20 NOTE — PATIENT INSTRUCTIONS
Assessing Cancer Risk  Only about 5-10% of cancers are thought to be due to an inherited cancer susceptibility gene.    These families often have:    Several people with the same or related types of cancer    Cancers diagnosed at a young age (before age 50)    Individuals with more than one primary cancer    Multiple generations of the family affected with cancer    Some people may be candidates for genetic testing of more than one gene.  For these families, genetic testing using a cancer panel may be offered.  These panels will test different genes known to increase the risk for breast, ovarian, uterine, and/or other cancers. All of the genes discussed below have published clinical management guidelines for individuals who are found to carry a mutation. The purpose of this handout is to serve as a brief summary of the genes analyzed by the panels used to inquire about hereditary breast and gynecologic cancer:  DARSHAN, BRCA1, BRCA2, BRIP1, CDH1, CHEK2, MLH1, MSH2, MSH6, PMS2, EPCAM, PTEN, PALB2, RAD51C, RAD51D, and TP53.  ______________________________________________________________________________  Hereditary Breast and Ovarian Cancer Syndrome   (BRCA1 and BRCA2)  A single mutation in one of the copies of BRCA1 or BRCA2 increases the risk for breast and ovarian cancer, among others.  The risk for pancreatic cancer and melanoma may also be slightly increased in some families.  The chart below shows the chance that someone with a BRCA mutation would develop cancer in his or her lifetime1,2,3,4.        A person s ethnic background is also important to consider, as individuals of Ashkenazi [place] ancestry have a higher chance of having a BRCA gene mutation.  There are three BRCA mutations that occur more frequently in this population.    Londono Syndrome   (MLH1, MSH2, MSH6, PMS2, and EPCAM)  Currently five genes are known to cause Londono Syndrome: MLH1, MSH2, MSH6, PMS2, and EPCAM.  A single mutation in one of the  Londono Syndrome genes increases the risk for colon, endometrial, ovarian, and stomach cancers.  Other cancers that occur less commonly in Londono Syndrome include urinary tract, skin, and brain cancers.  The chart below shows the chance that a person with Londono syndrome would develop cancer in his or her lifetime5.      *Cancer risk varies depending on Londono syndrome gene found    Cowden Syndrome   (PTEN)  Cowden syndrome is a hereditary condition that increases the risk for breast, thyroid, endometrial, colon, and kidney cancer.  Cowden syndrome is caused by a mutation in the PTEN gene.  A single mutation in one of the copies of PTEN causes Cowden syndrome and increases cancer risk.  The chart below shows the chance that someone with a PTEN mutation would develop cancer in their lifetime6,7.  Other benign features seen in some individuals with Cowden syndrome include benign skin lesions (facial papules, keratoses, lipomas), learning disability, autism, thyroid nodules, colon polyps, and larger head size.      *One recent study found breast cancer risk to be increased to 85%    Li-Fraumeni Syndrome   (TP53)  Li-Fraumeni Syndrome (LFS) is a cancer predisposition syndrome caused by a mutation in the TP53 gene. A single mutation in one of the copies of TP53 increases the risk for multiple cancers. Individuals with LFS are at an increased risk for developing cancer at a young age. The lifetime risk for development of a LFS-associated cancer is 50% by age 30 and 90% by age 60.   Core Cancers: Sarcomas, Breast, Brain, Lung, Leukemias/Lymphomas, Adrenocortical carcinomas  Other Cancers: Gastrointestinal, Thyroid, Skin, Genitourinary    Hereditary Diffuse Gastric Cancer   (CDH1)  Currently, one gene is known to cause hereditary diffuse gastric cancer (HDGC): CDH1.  Individuals with HDGC are at increased risk for diffuse gastric cancer and lobular breast cancer. Of people diagnosed with HDGC, 30-50% have a mutation in the CDH1  gene.  This suggests there are likely other genes that may cause HDGC that have not been identified yet.      Lifetime Cancer Risks    General Population HDGC    Diffuse Gastric  <1% ~80%   Breast 12% 39-52%         Additional Genes  DARSHAN  DARSHAN is a moderate-risk breast cancer gene. Women who have a mutation in DARSHAN can have between a 2-4 fold increased risk for breast cancer compared to the general population8. DARSHAN mutations have also been associated with increased risk for pancreatic cancer, however an estimate of this cancer risk is not well understood9. Individuals who inherit two DARSHAN mutations have a condition called ataxia-telangiectasia (AT).  This rare autosomal recessive condition affects the nervous system and immune system, and is associated with progressive cerebellar ataxia beginning in childhood.  Individuals with ataxia-telangiectasia often have a weakened immune system and have an increased risk for childhood cancers.    PALB2  Mutations in PALB2 have been shown to increase the risk of breast cancer up to 33-58% in some families; where individuals fall within this risk range is dependent upon family [id number]. PALB2 mutations have also been associated with increased risk for pancreatic cancer, although this risk has not been quantified yet.  Individuals who inherit two PALB2 mutations--one from their mother and one from their father--have a condition called Fanconi Anemia.  This rare autosomal recessive condition is associated with short stature, developmental delay, bone marrow failure, and increased risk for childhood cancers.    CHEK2   CHEK2 is a moderate-risk breast cancer gene.  Women who have a mutation in CHEK2 have around a 2-fold increased risk for breast cancer compared to the general population, and this risk may be higher depending upon family history.11,12,13 Mutations in CHEK2 have also been shown to increase the risk of a number of other cancers, including colon and prostate, however  these cancer risks are currently not well understood.    BRIP1, RAD51C and RAD51D  Mutations in BRIP1, RAD51C, and RAD51D have been shown to increase the risk of ovarian cancer and possibly female breast cancer as well14,15 .       Lifetime Cancer Risk    General Population BRIP1 RAD51C RAD51D   Ovarian 1-2% ~5-8% ~5-9% ~7-15%           Inheritance  All of the cancer syndromes reviewed above are inherited in an autosomal dominant pattern.  This means that if a parent has a mutation, each of his or her children will have a 50% chance of inheriting that same mutation.  Therefore, each child--male or female--would have a 50% chance of being at increased risk for developing cancer.      Image obtained from Genetics Home Reference, 2013     Mutations in some genes can occur de cynthia, which means that a person s mutation occurred for the first time in them and was not inherited from a parent.  Now that they have the mutation, however, it can be passed on to future generations.    Genetic Testing  Genetic testing involves a blood test and will look at the genetic information in the DARSHAN, BRCA1, BRCA2, BRIP1, CDH1, CHEK2, MLH1, MSH2, MSH6, PMS2, EPCAM, PTEN, PALB2, RAD51C, RAD51D, and TP53 genes for any harmful mutations that are associated with increased cancer risk.  If possible, it is recommended that the person(s) who has had cancer be tested before other family members.  That person will give us the most useful information about whether or not a specific gene is associated with the cancer in the family.    Results  There are three possible results of genetic testing:    Positive--a harmful mutation was identified in one or more of the genes    Negative--no mutation was identified in any of the genes on this panel    Variant of unknown significance--a variation in one of the genes was identified, but it is unclear how this impacts cancer risk in the family    Advantages and Disadvantages   There are advantages and  disadvantages to genetic testing.    Advantages    May clarify your cancer risk    Can help you make medical decisions    May explain the cancers in your family    May give useful information to your family members (if you share your results)    Disadvantages    Possible negative emotional impact of learning about inherited cancer risk    Uncertainty in interpreting a negative test result in some situations    Possible genetic discrimination concerns (see below)    Genetic Information Nondiscrimination Act (PJ)  PJ is a federal law that protects individuals from health insurance or employment discrimination based on a genetic test result alone.  Although rare, there are currently no legal discrimination protections in terms of life insurance, long term care, or disability insurances.  Visit the [employer] website to learn more.    Reducing Cancer Risk  All of the genes described above have nationally recognized cancer screening guidelines that would be recommended for individuals who test positive.  In addition to increased cancer screening, surgeries may be offered or recommended to reduce cancer risk.  Recommendations are based upon an individual s genetic test result as well as their personal and family history of cancer.    Questions to Think About Regarding Genetic Testing:    What effect will the test result have on me and my relationship with my family members if I have an inherited gene mutation?  If I don t have a gene mutation?    Should I share my test results, and how will my family react to this news, which may also affect them?    Are my children ready to learn new information that may one day affect their own health?    Hereditary Cancer Resources    FORCE: Facing Our Risk of Cancer Empowered facingourrisk.org   Bright Pink bebrightpink.org   Li-Fraumeni Syndrome Association lfsassociation.org   PTEN World PTENworld.com   No stomach for cancer, Inc.  nostomachforcancer.org   Stomach cancer relief network Scrnet.org   Collaborative Group of the Americas on Inherited Colorectal Cancer (CGA) cgaicc.com    Cancer Care cancercare.org   American Cancer Society (ACS) cancer.org   National Cancer [place] (NCI) cancer.gov     Please call us if you have any questions or concerns.   Cancer Risk Management Program [phone]-CANCER ([phone])  ? Spencer Perez, MS, [facility] [phone]  ? Candice Polanco, MS, [facility]  [phone]  ? Archana Malone, MS, [facility]  [phone]  ? Asuncion Velasquez, MS, [facility] [phone]  ? Marivel Gerri, MS, [facility] [phone]  ? Tete Carranza, MS, [facility]  [phone]  ? Monse Lake, MS, [facility]  [phone]    References  1. Nathalie DWYER, Monique PDP, Duncan S, James MCKENZIE, Aurora JE, Oliver JL, Isidro N, Francia H, Ian O, Lyubov A, Arnaldoini B, Radidelilah P, Mangalekichar S, Zachariah DM, Hansen N, Howie E, Morena H, Lex E, Ananda J, Gronthony J, Laith B, Harperus H, Thorlacius S, Eerola H, Nevcharlinna H, Moy K, Louann OP. Average risks of breast and ovarian cancer associated with BRCA1 or BRCA2 mutations detected in case series unselected for family history: a combined analysis of 222 studies. Am J Hum Sonam. 2003;72:1117-30.  2. Geovanna N, Nhi M, Jj G.  BRCA1 and BRCA2 Hereditary Breast and Ovarian Cancer. Gene Reviews online. 2013.  3. Ottoniel YC, Kylah S, Lion G, Adrian S. Breast cancer risk among male BRCA1 and BRCA2 mutation carriers. J Natl Cancer Inst. 2007;99:1811-4.  4. Ariel CINTRON, Irish I, Santhosh J, Jaime E, Cassandra ER, Leticia F. Risk of breast cancer in male BRCA2 carriers. J Med Sonam. 2010;47:710-1.  5. National Comprehensive Cancer Network. Clinical practice guidelines in oncology, colorectal cancer screening. Available online (registration required). 2015.  6. Max CONN, Marisol J, Cory J, Jefferson LA, America CRAIG, Kenia C. Lifetime cancer risks in individuals with germline PTEN mutations. Clin Cancer Res. 2012;18:400-7.  7. Pilarski R. Cowden  Syndrome: A Critical Review of the Clinical Literature. J Sonam . 2009:18:13-27.  8. Jacobo A, Jarrod D, Nayan S, Love P, Jackson T, Tonja M, Bharat B, iTti H, Arthur R, Bethanie K, Justin L, Ariel DG, Zachariah D, Sven DF, Shea MR, The Breast Cancer Susceptibility Collaboration (UK) & Wilber MORENO. DARSHAN mutations that cause ataxia-telangiectasia are breast cancer susceptibility alleles. Nature Genetics. 2006;38:873-875  9. True N , Wm Y, Akiko J, Robert L, Phoenix GM , Spring ML, Gallinger S, Mcgrath AG, Syngal S, Radha ML, Corey J , Thalia R, Carter SZ, Niki JR, Harry VE, Elza M, Vorafaela B, Clint N, Víctor RH, Bam KW, and Talha AP. DARSHAN mutations in patients with hereditary pancreatic cancer. Cancer Discover. 2012;2:41-46  10. Nathalie HERNANDEZ, et al. Breast-Cancer Risk in Families with Mutations in PALB2. NEJM. 2014; 371(6):497-506.  11. CHEK2 Breast Cancer Case-Control Consortium. CHEK2*1100delC and susceptibility to breast cancer: A collaborative analysis involving 10,860 breast cancer cases and 9,065 controls from 10 studies. Am J Hum Sonam, 74 (2004), pp. 4677-8908  12. Faby T, Mai S, Paula K, et al. Spectrum of Mutations in BRCA1, BRCA2, CHEK2, and TP53 in Families at High Risk of Breast Cancer. KAILA. 2006;295(12):7959-7727.   13. Lavelle C, Miguel A D, Jerry A, et al. Risk of breast cancer in women with a CHEK2 mutation with and without a family history of breast cancer. J Clin Oncol. 2011;29:8126-2389.  14. Leroy H, Gal E, Apolonia SJ, et al. Contribution of germline mutations in the RAD51B, RAD51C, and RAD51D genes to ovarian cancer in the population. J Clin Oncol. 2015;33(26):7828-5039. Doi:10.1200/JCO.2015.61.2408.  15. Maryan T, Robert FELIX, Rhoda P, et al. Mutations in BRIP1 confer high risk of ovarian cancer. Rachael Sonam. 2011;43(11):5755-5385. doi:10.1038/ng.955.

## 2020-02-04 LAB — LAB SCANNED RESULT: NORMAL

## 2020-02-08 ENCOUNTER — HEALTH MAINTENANCE LETTER (OUTPATIENT)
Age: 80
End: 2020-02-08

## 2020-02-17 ENCOUNTER — VIRTUAL VISIT (OUTPATIENT)
Dept: ONCOLOGY | Facility: CLINIC | Age: 80
End: 2020-02-17
Attending: GENETIC COUNSELOR, MS
Payer: MEDICARE

## 2020-02-17 DIAGNOSIS — Z80.3 FAMILY HISTORY OF MALIGNANT NEOPLASM OF BREAST: ICD-10-CM

## 2020-02-17 DIAGNOSIS — Z85.3 PERSONAL HISTORY OF MALIGNANT NEOPLASM OF BREAST: Primary | ICD-10-CM

## 2020-02-17 PROCEDURE — 40000072 ZZH STATISTIC GENETIC COUNSELING, < 16 MIN

## 2020-02-17 NOTE — LETTER
Cancer Risk Management  Program Locations    [facility]  [facility]  Mailing Address  Cancer Risk Management Program  [facility]  [address]  [facility]  [place] [address]    New patient appointments  [phone]  February 19, 2020    Shannon Krishna  [address]   [facility] [address]      Dear Shannon,    It was a pleasure speaking with you over the phone recently regarding your genetic testing results. Here is a copy of the progress note summarizing our conversation. If you have any additional questions, please feel free to call.    2/17/2020    Referring Provider: Elias Caal MD    Presenting Information:  I spoke to Shannon by phone today to discuss her genetic testing results. Her blood was drawn on 1/20/2020. BRCA1/2 Analyses with the [facility]Cancer panel was ordered from [facility]. This testing was done because of Shannon's personal and family history of breast cancer.    Genetic Testing Result: NEGATIVE  Shannon is negative for mutations in the DARSHAN, BRCA1, BRCA2, BRIP1, CDH1, CHEK2, EPCAM, MLH1, MSH2, MSH6, NBN, NF1, PALB2, PMS2, PTEN, RAD51C, RAD51D, STK11, and TP53 genes. No mutations were found in any of the 19 genes analyzed. This test involved sequencing and deletion/duplication analysis of all genes with the exception of EPCAM (deletions/duplications only).    Testing did not detect an identifiable mutation associated with Hereditary Breast and Ovarian Cancer syndrome (BRCA1, BRCA2), Hereditary Diffuse Gastric Cancer (CDH1), Londono syndrome (EPCAM, MLH1, MSH2, MSH6, PMS2), Li Fraumeni syndrome (TP53), Peutz-Jeghers syndrome (STK11), Neurofibromatosis type 1 (NF1), or Cowden syndrome (PTEN).    A copy of the test report can be found in the Laboratory tab, dated 1/20/2020, and named  "\"SEND OUTS MISC TEST\". The report is scanned in as a linked document.    Interpretation:  We discussed several different interpretations of this negative test result.    1. One explanation may be that there is a different gene or combination of genes and environment that are associated with the cancers in this family that are not identifiable using this test. As such, Shannon is encouraged to contact me regularly to review any new genetic testing options that may be appropriate for her.  2. Another explanation may be that her mother did have a mutation in one of these 19 genes and Shannon did not inherit it. If that is the case, Shannon's breast cancer may be sporadic and caused by random cellular changes.  3. There is also a small possibility that there is a mutation in one of these genes, and the testing laboratory could not find it with their current testing methods.       Screening:  Based on this negative test result, it is important for Shannon and her relatives to refer back to the family history for appropriate cancer screening.      Shannon should continue to follow all breast cancer screening recommendations as made by her medical providers.     Shannon s close female relatives remain at increased risk for breast cancer given their family history. Breast cancer screening is generally recommended to begin approximately 10 years younger than the earliest age of breast cancer diagnosis in the family, or at age 40, whichever comes first. Breast screening options should be discussed with an individual's primary care provider and a genetic counselor, to determine at what age to begin screening, what screening is appropriate, and if additional screening (such as breast MRI) is necessary based on personal/family history factors.    Other population cancer screening options, such as those recommended by the American Cancer Society and the National Comprehensive Cancer Network (NCCN), are also appropriate for " Shannon and her family. These screening recommendations may change if there are changes to Shannon's personal and/or family history of cancer. Final screening recommendations should be made by each individual's managing physician.     Additional Testing Considerations:  We reviewed that it is still possible Shannon does carry a currently identifiable gene or combination of genes and environment that increase her risk for breast cancer. We again discussed the option of larger gene panels covering more moderate risk genes associated with hereditary breast cancer. Shannon is encouraged to contact me if she wishes to readdress these larger gene panel options.     Also, although Shannon's genetic testing result was negative, other relatives may still carry a gene mutation associated with hereditary breast cancer. Shannon's niece and extended maternal relatives could consider genetic counseling to discuss their genetic testing options. If any of these relatives do pursue genetic testing, Shannon is encouraged to contact me so that we may review the impact of their test results on her.    Summary:  We do not have an explanation for Shannon's breast cancer. While no genetic changes were identified, Shannon may still be at risk for certain cancers due to family history, environmental factors, or other genetic causes not identified by this test. Because of that, it is important that she continue with cancer screening based on her personal and family history as discussed above.    Genetic testing is rapidly advancing, and new cancer susceptibility genes will most likely be identified in the future. Therefore, I encouraged Shannon to contact me regularly or if there are changes in her personal or family history. This may change how we assess her cancer risk, screening, and the testing we would offer.    Plan:  1. Shannon will be mailed a copy of her test results.  2. She plans to follow-up with her medical providers, as  needed.  3. She should contact me regularly and/or if her family history changes.    If Shannon has any further questions, I encouraged her to contact me at [phone].    Time spent over the phone: 4 minutes    Asuncion Velasquez MS, [facility]  Licensed Genetic Counselor  Office: [phone]  Pager: [phone]

## 2020-02-17 NOTE — PROGRESS NOTES
"2/17/2020    Referring Provider: Elias Caal MD    Presenting Information:  I spoke to Shannon by phone today to discuss her genetic testing results. Her blood was drawn on 1/20/2020. BRCA1/2 Analyses with the [employer] panel was ordered from [employer]. This testing was done because of Shannon's personal and family history of breast cancer.    Genetic Testing Result: NEGATIVE  Shannon is negative for mutations in the DARSHAN, BRCA1, BRCA2, BRIP1, CDH1, CHEK2, EPCAM, MLH1, MSH2, MSH6, NBN, NF1, PALB2, PMS2, PTEN, RAD51C, RAD51D, STK11, and TP53 genes. No mutations were found in any of the 19 genes analyzed. This test involved sequencing and deletion/duplication analysis of all genes with the exception of EPCAM (deletions/duplications only).    Testing did not detect an identifiable mutation associated with Hereditary Breast and Ovarian Cancer syndrome (BRCA1, BRCA2), Hereditary Diffuse Gastric Cancer (CDH1), Londono syndrome (EPCAM, MLH1, MSH2, MSH6, PMS2), Li Fraumeni syndrome (TP53), Peutz-Jeghers syndrome (STK11), Neurofibromatosis type 1 (NF1), or Cowden syndrome (PTEN).    A copy of the test report can be found in the Laboratory tab, dated 1/20/2020, and named \"SEND OUTS MISC TEST\". The report is scanned in as a linked document.    Interpretation:  We discussed several different interpretations of this negative test result.    1. One explanation may be that there is a different gene or combination of genes and environment that are associated with the cancers in this family that are not identifiable using this test. As such, Shannon is encouraged to contact me regularly to review any new genetic testing options that may be appropriate for her.  2. Another explanation may be that her mother did have a mutation in one of these 19 genes and Shannon did not inherit it. If that is the case, Shannon's breast cancer may be sporadic and caused by random cellular changes.  3. There is also a small possibility " that there is a mutation in one of these genes, and the testing laboratory could not find it with their current testing methods.       Screening:  Based on this negative test result, it is important for Shannon and her relatives to refer back to the family history for appropriate cancer screening.      Shannon should continue to follow all breast cancer screening recommendations as made by her medical providers.     Shannon s close female relatives remain at increased risk for breast cancer given their family history. Breast cancer screening is generally recommended to begin approximately 10 years younger than the earliest age of breast cancer diagnosis in the family, or at age 40, whichever comes first. Breast screening options should be discussed with an individual's primary care provider and a genetic counselor, to determine at what age to begin screening, what screening is appropriate, and if additional screening (such as breast MRI) is necessary based on personal/family history factors.    Other population cancer screening options, such as those recommended by the American Cancer Society and the National Comprehensive Cancer Network (NCCN), are also appropriate for Shannon and her family. These screening recommendations may change if there are changes to Shannon's personal and/or family history of cancer. Final screening recommendations should be made by each individual's managing physician.     Additional Testing Considerations:  We reviewed that it is still possible Shannon does carry a currently identifiable gene or combination of genes and environment that increase her risk for breast cancer. We again discussed the option of larger gene panels covering more moderate risk genes associated with hereditary breast cancer. Shannon is encouraged to contact me if she wishes to readdress these larger gene panel options.     Also, although Shannon's genetic testing result was negative, other relatives may still  carry a gene mutation associated with hereditary breast cancer. Shannon's niece and extended maternal relatives could consider genetic counseling to discuss their genetic testing options. If any of these relatives do pursue genetic testing, Shannon is encouraged to contact me so that we may review the impact of their test results on her.    Summary:  We do not have an explanation for Shannon's breast cancer. While no genetic changes were identified, Shannon may still be at risk for certain cancers due to family history, environmental factors, or other genetic causes not identified by this test. Because of that, it is important that she continue with cancer screening based on her personal and family history as discussed above.    Genetic testing is rapidly advancing, and new cancer susceptibility genes will most likely be identified in the future. Therefore, I encouraged Shannon to contact me regularly or if there are changes in her personal or family history. This may change how we assess her cancer risk, screening, and the testing we would offer.    Plan:  1. Shannon will be mailed a copy of her test results.  2. She plans to follow-up with her medical providers, as needed.  3. She should contact me regularly and/or if her family history changes.    If Shannon has any further questions, I encouraged her to contact me at [phone].    Time spent over the phone: 4 minutes    Asuncion Velasquez MS, [facility]  Licensed Genetic Counselor  Office: [phone]  Pager: [phone]

## 2020-02-17 NOTE — Clinical Note
"Please print and send a copy of this letter and the patient's genetic testing report to the patient.    Please enclose test report: \"Send outs misc test [[id number]] (Order [id number])\"  "

## 2020-02-27 ENCOUNTER — APPOINTMENT (OUTPATIENT)
Dept: LAB | Facility: CLINIC | Age: 80
End: 2020-02-27
Attending: INTERNAL MEDICINE
Payer: MEDICARE

## 2020-02-27 ENCOUNTER — ANCILLARY PROCEDURE (OUTPATIENT)
Dept: MAMMOGRAPHY | Facility: CLINIC | Age: 80
End: 2020-02-27
Attending: PHYSICIAN ASSISTANT
Payer: MEDICARE

## 2020-02-27 ENCOUNTER — ONCOLOGY SURVIVORSHIP VISIT (OUTPATIENT)
Dept: ONCOLOGY | Facility: CLINIC | Age: 80
End: 2020-02-27
Attending: PHYSICIAN ASSISTANT
Payer: MEDICARE

## 2020-02-27 VITALS
TEMPERATURE: 97.8 F | DIASTOLIC BLOOD PRESSURE: 74 MMHG | WEIGHT: 151.3 LBS | HEART RATE: 84 BPM | BODY MASS INDEX: 25.21 KG/M2 | HEIGHT: 65 IN | RESPIRATION RATE: 16 BRPM | OXYGEN SATURATION: 97 % | SYSTOLIC BLOOD PRESSURE: 119 MMHG

## 2020-02-27 DIAGNOSIS — Z79.811 USE OF AROMATASE INHIBITORS: ICD-10-CM

## 2020-02-27 DIAGNOSIS — Z17.0 MALIGNANT NEOPLASM OF UPPER-OUTER QUADRANT OF RIGHT BREAST IN FEMALE, ESTROGEN RECEPTOR POSITIVE (H): ICD-10-CM

## 2020-02-27 DIAGNOSIS — Z85.3 PERSONAL HISTORY OF MALIGNANT NEOPLASM OF BREAST: Primary | ICD-10-CM

## 2020-02-27 DIAGNOSIS — C50.411 MALIGNANT NEOPLASM OF UPPER-OUTER QUADRANT OF RIGHT BREAST IN FEMALE, ESTROGEN RECEPTOR POSITIVE (H): ICD-10-CM

## 2020-02-27 DIAGNOSIS — Z51.89 ENCOUNTER FOR OTHER SPECIFIED AFTERCARE: ICD-10-CM

## 2020-02-27 DIAGNOSIS — Z79.899 ENCOUNTER FOR LONG-TERM (CURRENT) USE OF OTHER MEDICATIONS: ICD-10-CM

## 2020-02-27 DIAGNOSIS — M81.0 OSTEOPOROSIS, UNSPECIFIED OSTEOPOROSIS TYPE, UNSPECIFIED PATHOLOGICAL FRACTURE PRESENCE: ICD-10-CM

## 2020-02-27 LAB
ALBUMIN SERPL-MCNC: 3.6 G/DL (ref 3.4–5)
ALP SERPL-CCNC: 62 U/L (ref 40–150)
ALT SERPL W P-5'-P-CCNC: 26 U/L (ref 0–50)
ANION GAP SERPL CALCULATED.3IONS-SCNC: 6 MMOL/L (ref 3–14)
AST SERPL W P-5'-P-CCNC: 20 U/L (ref 0–45)
BASOPHILS # BLD AUTO: 0.1 10E9/L (ref 0–0.2)
BASOPHILS NFR BLD AUTO: 0.7 %
BILIRUB SERPL-MCNC: 0.4 MG/DL (ref 0.2–1.3)
BUN SERPL-MCNC: 15 MG/DL (ref 7–30)
CALCIUM SERPL-MCNC: 8.7 MG/DL (ref 8.5–10.1)
CHLORIDE SERPL-SCNC: 104 MMOL/L (ref 94–109)
CO2 SERPL-SCNC: 24 MMOL/L (ref 20–32)
CREAT SERPL-MCNC: 0.71 MG/DL (ref 0.52–1.04)
DIFFERENTIAL METHOD BLD: NORMAL
EOSINOPHIL # BLD AUTO: 0.2 10E9/L (ref 0–0.7)
EOSINOPHIL NFR BLD AUTO: 2.3 %
ERYTHROCYTE [DISTWIDTH] IN BLOOD BY AUTOMATED COUNT: 14.3 % (ref 10–15)
GFR SERPL CREATININE-BSD FRML MDRD: 81 ML/MIN/{1.73_M2}
GLUCOSE SERPL-MCNC: 82 MG/DL (ref 70–99)
HCT VFR BLD AUTO: 39.9 % (ref 35–47)
HGB BLD-MCNC: 13.2 G/DL (ref 11.7–15.7)
IMM GRANULOCYTES # BLD: 0 10E9/L (ref 0–0.4)
IMM GRANULOCYTES NFR BLD: 0.3 %
LYMPHOCYTES # BLD AUTO: 1.6 10E9/L (ref 0.8–5.3)
LYMPHOCYTES NFR BLD AUTO: 22.7 %
MCH RBC QN AUTO: 29.1 PG (ref 26.5–33)
MCHC RBC AUTO-ENTMCNC: 33.1 G/DL (ref 31.5–36.5)
MCV RBC AUTO: 88 FL (ref 78–100)
MONOCYTES # BLD AUTO: 0.6 10E9/L (ref 0–1.3)
MONOCYTES NFR BLD AUTO: 8.8 %
NEUTROPHILS # BLD AUTO: 4.5 10E9/L (ref 1.6–8.3)
NEUTROPHILS NFR BLD AUTO: 65.2 %
NRBC # BLD AUTO: 0 10*3/UL
NRBC BLD AUTO-RTO: 0 /100
PLATELET # BLD AUTO: 198 10E9/L (ref 150–450)
POTASSIUM SERPL-SCNC: 4.1 MMOL/L (ref 3.4–5.3)
PROT SERPL-MCNC: 6.9 G/DL (ref 6.8–8.8)
RBC # BLD AUTO: 4.53 10E12/L (ref 3.8–5.2)
SODIUM SERPL-SCNC: 135 MMOL/L (ref 133–144)
WBC # BLD AUTO: 6.8 10E9/L (ref 4–11)

## 2020-02-27 PROCEDURE — 80053 COMPREHEN METABOLIC PANEL: CPT | Performed by: INTERNAL MEDICINE

## 2020-02-27 PROCEDURE — 96372 THER/PROPH/DIAG INJ SC/IM: CPT | Mod: ZF

## 2020-02-27 PROCEDURE — 99214 OFFICE O/P EST MOD 30 MIN: CPT | Mod: ZP | Performed by: PHYSICIAN ASSISTANT

## 2020-02-27 PROCEDURE — G0463 HOSPITAL OUTPT CLINIC VISIT: HCPCS | Mod: ZF

## 2020-02-27 PROCEDURE — 36415 COLL VENOUS BLD VENIPUNCTURE: CPT

## 2020-02-27 PROCEDURE — 25000128 H RX IP 250 OP 636: Mod: ZF | Performed by: PHYSICIAN ASSISTANT

## 2020-02-27 PROCEDURE — 85025 COMPLETE CBC W/AUTO DIFF WBC: CPT | Performed by: INTERNAL MEDICINE

## 2020-02-27 RX ORDER — METHYLPREDNISOLONE SODIUM SUCCINATE 125 MG/2ML
125 INJECTION, POWDER, LYOPHILIZED, FOR SOLUTION INTRAMUSCULAR; INTRAVENOUS
Status: CANCELLED
Start: 2020-03-03

## 2020-02-27 RX ORDER — NALOXONE HYDROCHLORIDE 0.4 MG/ML
.1-.4 INJECTION, SOLUTION INTRAMUSCULAR; INTRAVENOUS; SUBCUTANEOUS
Status: CANCELLED | OUTPATIENT
Start: 2020-03-03

## 2020-02-27 RX ORDER — ALBUTEROL SULFATE 90 UG/1
1-2 AEROSOL, METERED RESPIRATORY (INHALATION)
Status: CANCELLED
Start: 2020-03-03

## 2020-02-27 RX ORDER — DIPHENHYDRAMINE HYDROCHLORIDE 50 MG/ML
50 INJECTION INTRAMUSCULAR; INTRAVENOUS
Status: CANCELLED
Start: 2020-03-03

## 2020-02-27 RX ORDER — EPINEPHRINE 1 MG/ML
0.3 INJECTION, SOLUTION INTRAMUSCULAR; SUBCUTANEOUS EVERY 5 MIN PRN
Status: CANCELLED | OUTPATIENT
Start: 2020-03-03

## 2020-02-27 RX ORDER — MEPERIDINE HYDROCHLORIDE 25 MG/ML
25 INJECTION INTRAMUSCULAR; INTRAVENOUS; SUBCUTANEOUS EVERY 30 MIN PRN
Status: CANCELLED | OUTPATIENT
Start: 2020-03-03

## 2020-02-27 RX ORDER — SODIUM CHLORIDE 9 MG/ML
1000 INJECTION, SOLUTION INTRAVENOUS CONTINUOUS PRN
Status: CANCELLED
Start: 2020-03-03

## 2020-02-27 RX ORDER — EPINEPHRINE 0.3 MG/.3ML
0.3 INJECTION SUBCUTANEOUS EVERY 5 MIN PRN
Status: CANCELLED | OUTPATIENT
Start: 2020-03-03

## 2020-02-27 RX ORDER — ALBUTEROL SULFATE 0.83 MG/ML
2.5 SOLUTION RESPIRATORY (INHALATION)
Status: CANCELLED | OUTPATIENT
Start: 2020-03-03

## 2020-02-27 RX ADMIN — DENOSUMAB 60 MG: 60 INJECTION SUBCUTANEOUS at 15:16

## 2020-02-27 ASSESSMENT — MIFFLIN-ST. JEOR: SCORE: 1167.17

## 2020-02-27 ASSESSMENT — PAIN SCALES - GENERAL: PAINLEVEL: NO PAIN (0)

## 2020-02-27 NOTE — NURSING NOTE
Prolia 60 mg injection given to patient in Right Arm. Patient tolerated injection without any incidents.     See FATOUMATA Ley, CMA February 27, 2020  3:20 PM

## 2020-02-27 NOTE — NURSING NOTE
"Oncology Rooming Note    February 27, 2020 2:34 PM   Shannon Krishna is a 79 year old female who presents for:    Chief Complaint   Patient presents with     Blood Draw     labs drawn with vpt by rn.       Oncology Clinic Visit     [facility] ONCOLOGY SURVIVORSHIP SHIP- BREAST CA     Initial Vitals: /74 (BP Location: Right arm, Patient Position: Chair, Cuff Size: Adult Large)   Pulse 84   Temp 97.8  F (36.6  C) (Oral)   Resp 16   Ht 1.651 m (5' 5\")   Wt 68.6 kg (151 lb 4.8 oz)   SpO2 97%   BMI 25.18 kg/m   Estimated body mass index is 25.18 kg/m  as calculated from the following:    Height as of this encounter: 1.651 m (5' 5\").    Weight as of this encounter: 68.6 kg (151 lb 4.8 oz). Body surface area is 1.77 meters squared.  No Pain (0) Comment: Data Unavailable   No LMP recorded. Patient is postmenopausal.  Allergies reviewed: Yes  Medications reviewed: Yes    Medications: Medication refills not needed today.  Pharmacy name entered into [employer]: [facility] [place] - [place], [place] - [address] [place] [address]    Clinical concerns: No new concerns Darcy Bruce was notified.      Azael Giraldo LPN            "

## 2020-02-27 NOTE — PATIENT INSTRUCTIONS
Due to being over 10 years out from your breast cancer, you may transition your cares to your primary care provider. You should be seeing primary care at least once yearly. For your history of breast cancer, we would recommend:  - Annual breast exam  - Annual mammogram (screening mammogram is OK given that you are this far out)    We also recommend the following:    Bone health: Your prior bone density scans were performed due to use of the aromatase inhibitor medication, which can cause loss of bone density. Dr. Caal has recommended that you receive Prolia, which we will give today. Future bone density scans, and the decision regarding any future Prolia, should be made between you and your primary care provider.    Cancer screenings: We know that people who have a history of cancer can be at an increased risk for other cancers during their lifetime. It is important that you see your primary care provider at least annually to help coordinate any necessary cancer screenings.    Promoting healthy lifestyle: Healthy lifestyle habits have been associated with improved overall health and quality of life. For some cancers, a healthy lifestyle has been associated with a reduced risk of recurrence and death. We recommend the following lifestyle habits:  maintaining a healthy BMI (body mass index) throughout life  - Engaging in at least 150 minutes of moderate activity weekly (moderate is described as any activity in which you could talk, but not sing, including dancing, biking on level ground, - general gardening, baseball, tennis, brisk walking, water aerobics, yoga, pilates, etc), or 75 minutes of vigorous activity weekly (vigorous is described as being able to say a few words without stopping to catch a breath) biking faster than 10 mph, hiking uphill, jogging, swimming, stair climbing, high-intensity yoga, etc)  maintaining a healthy diet high in vegetables, fruits, and whole grains; and low in refined sugars, red  meat (less than 18 oz per week), and processed meats  - Limiting alcohol to no more than one drink per day for women, and two drinks per day for men  practicing sun safety with use of sunscreen of at least 30, that protects against UVA and UVB rays, and is water resistant.   - You should receive the influenza vaccine annually. If you are over the age of 50, you should talk with your primary care provider about getting the recombinant zoster (shingles) vaccination.  - Dental cleanings every 6-12 months  - Eye examinations every 1-2 years or as indicated by your eye doctor

## 2020-02-27 NOTE — NURSING NOTE
Chief Complaint   Patient presents with     Blood Draw     labs drawn with vpt by rn.       Labs drawn with vpt by rn.  Pt tolerated well. Pt checked in for next appt.    Milady Petersen RN

## 2020-02-27 NOTE — PROGRESS NOTES
CANCER SURVIVORSHIP VISIT   Feb 27, 2020    REASON FOR VISIT: survivorship visit for history of breast cancer    CANCER HISTORY AND TREATMENT:  - Diagnosed in 2008 with a stage I, T1 N0M0 infiltrating ductal carcinoma of the right breast, ER-positive, HER2 positive  - Treated with lumpectomy, sentinel lymph node bopsy July 2008, with nodes negative for malignancy  - Received adjuvant chemotherapy with 6 cycles of TCH (taxotere, carboplatin, herceptin), followed by adjuvant Herceptin to complete a full year of Herceptin therapy  - Completed adjuvant radiation February 2009  - Started adjuvant endocrine therapy with Arimidex March 2009, discontinued September 2019 (completed 10 years of therapy)    INTERVAL HISTORY:   Shannon has no new concerns.  Her mammogram today was without concerns.  She sees her primary care at least annually, as well as cardiology. Her only concern is that her pepcid isn't covered by insurance and this is upsetting her. She has no concerns otherwise.    Remainder of 10pt ROS otherwise is negative.    Current Outpatient Medications   Medication Sig Dispense Refill     amitriptyline (ELAVIL) 25 MG tablet Take 1-2 tablets by mouth At Bedtime.       aspirin EC 81 MG EC tablet Take 1 tablet by mouth daily.       atorvastatin (LIPITOR) 40 MG tablet Take 40 mg by mouth daily.       denosumab (PROLIA) 60 MG/ML SOLN injection Inject 60 mg Subcutaneous once       diclofenac (VOLTAREN) 1 % GEL topical gel Apply 4 grams to knees or 2 grams to hands four times daily using enclosed dosing card. (Patient not taking: Reported on 5/14/2019) 100 g 1     isosorbide mononitrate (IMDUR) 60 MG 24 hr tablet Take 1 tablet (60 mg) by mouth daily 90 tablet 3     lisinopril (PRINIVIL,ZESTRIL) 10 MG tablet Take 1 tablet by mouth daily 10 mg total       metoprolol succinate ER (TOPROL-XL) 25 MG 24 hr tablet Take 1 tablet (25 mg) by mouth daily Please keep 5-14-19 clinic appt for further refills 90 tablet 3     Multiple  "Vitamin (MULTIVITAMINS PO) Take 1 tablet by mouth daily.       nitroGLYcerin (NITROSTAT) 0.4 MG sublingual tablet Place 1 tablet (0.4 mg) under the tongue every 5 minutes as needed for chest pain 25 tablet 1     Omega-3 Fatty Acids (OMEGA 3 PO) Take  by mouth. 1 every two days       oxazepam (SERAX) 10 MG capsule Take 1 capsule by mouth daily.       ranitidine (ZANTAC) 150 MG tablet Take 1 tablet by mouth 2 times daily. In the morning and in the evening.             Allergies   Allergen Reactions     Nkda [No Known Drug Allergies]        PHYSICAL EXAMINATION  /74 (BP Location: Right arm, Patient Position: Chair, Cuff Size: Adult Large)   Pulse 84   Temp 97.8  F (36.6  C) (Oral)   Resp 16   Ht 1.651 m (5' 5\")   Wt 68.6 kg (151 lb 4.8 oz)   SpO2 97%   BMI 25.18 kg/m    Constitutional: Alert, oriented female in no visible distress.  Eyes: PERRL. Anicteric sclerae.  ENT/Mouth: OM moist and pink without lesions or thrush.  CV: RRR, no murmurs appreciated.  Resp: CTAB throughout  Abdomen: Soft, non-tender, non-distended. Bowel sounds present. No masses appreciated. No organomegaly noted.  Extremities: No lower extremity edema appreciated.  Skin: Warm, dry. No bruising or petechiae noted.  Lymph: No cervical, supraclavicular, or axillary lymphadenopathy appreciated.   Neuro: CN II-XII grossly intact.  Breast: Well healed incision at R breast. No masses or nodules appreciated in either breast.     LABS:  Results for orders placed or performed in visit on 02/27/20 (from the past 24 hour(s))   Comprehensive metabolic panel   Result Value Ref Range    Sodium 135 133 - 144 mmol/L    Potassium 4.1 3.4 - 5.3 mmol/L    Chloride 104 94 - 109 mmol/L    Carbon Dioxide 24 20 - 32 mmol/L    Anion Gap 6 3 - 14 mmol/L    Glucose 82 70 - 99 mg/dL    Urea Nitrogen 15 7 - 30 mg/dL    Creatinine 0.71 0.52 - 1.04 mg/dL    GFR Estimate 81 >60 mL/min/[1.73_m2]    GFR Estimate If Black >90 >60 mL/min/[1.73_m2]    Calcium 8.7 8.5 - " 10.1 mg/dL    Bilirubin Total 0.4 0.2 - 1.3 mg/dL    Albumin 3.6 3.4 - 5.0 g/dL    Protein Total 6.9 6.8 - 8.8 g/dL    Alkaline Phosphatase 62 40 - 150 U/L    ALT 26 0 - 50 U/L    AST 20 0 - 45 U/L   CBC with platelets differential   Result Value Ref Range    WBC 6.8 4.0 - 11.0 10e9/L    RBC Count 4.53 3.8 - 5.2 10e12/L    Hemoglobin 13.2 11.7 - 15.7 g/dL    Hematocrit 39.9 35.0 - 47.0 %    MCV 88 78 - 100 fl    MCH 29.1 26.5 - 33.0 pg    MCHC 33.1 31.5 - 36.5 g/dL    RDW 14.3 10.0 - 15.0 %    Platelet Count 198 150 - 450 10e9/L    Diff Method Automated Method     % Neutrophils 65.2 %    % Lymphocytes 22.7 %    % Monocytes 8.8 %    % Eosinophils 2.3 %    % Basophils 0.7 %    % Immature Granulocytes 0.3 %    Nucleated RBCs 0 0 /100    Absolute Neutrophil 4.5 1.6 - 8.3 10e9/L    Absolute Lymphocytes 1.6 0.8 - 5.3 10e9/L    Absolute Monocytes 0.6 0.0 - 1.3 10e9/L    Absolute Eosinophils 0.2 0.0 - 0.7 10e9/L    Absolute Basophils 0.1 0.0 - 0.2 10e9/L    Abs Immature Granulocytes 0.0 0 - 0.4 10e9/L    Absolute Nucleated RBC 0.0          IMPRESSION/PLAN:  Shannon Krishna is a 79 year old female with a history of a stage I, T1 N0M0 infiltrating ductal carcinoma of the right breast, ER-positive, HER2 positive, s/p lumpectomy, adjuvant chemotherapy with THC, adjuvant radiation, and 10 years of adjuvant hormonal therapy with aromatase inhibitor, here for cancer survivorship visit following completion of cancer treatment.    Cancer history. She has now completed 10 years of adjuvant endocrine therapy as of 9/2019. We reviewed her cancer history. We discussed that given she is now 10 years out from treatment, that it would be appropriate to transition care to her primary care provider, whom she already sees annually. She should continue with annual screening mammography and annual clinical breast exam.     Genetic testing. She did undergo genetic testing, which was negative in the 19 genes analyzed (DARSHAN, BRCA1, BRCA2, BRIP1,  CDH1, CHEK2, EPCAM, MLH1, MSH2, MSH6, NBN, NF1, PALB2, PMS2, PTEN, RAD51C, RAD51D, STK11, and TP53 genes)    Cardiovascular toxicity. Given her exposure to carboplatin, she could be at an increased risk for vascular disease. She should continue to follow-up routinely with her PCP for lipid testing, and monitoring of blood pressure and blood glucose, with treatment as indicated. She does follow with [facility] for aortic valve stenosis and may need a TAVR.    Osteoporosis: She is postmenopausal. Dexa 9/2019 showing osteoporosis. Prolia was resumed at that time. She will receive a dose today.  Further dosing can be ordered by primary care with DEXA as recommended by primary care. She was recommended to perform weightbearing exercises 2-3 days per week, and to supplement with 1200 mg of calcium, 1000 international unites of vitamin D daily.     Radiation side effects. Radiation would have included the lungs, bone, skin. She should seek medical attention if she notices any new skin lesions in the area of radiation. The bones are at risk for fractures, so she should inform a provider with any new pains in these areas. The lungs are at risk for fibrosis, restrictive and/or obstructive lung disease. Currently, she is asymptomatic. If she should develop any shortness of breath, hemoptysis, cough, would consider further evaluation with CT or X-ray. No routine screening for potential lung complications unless symptomatic.    Risk of lymphedema: Can occur at any time. She will contact the clinic if she notices any swelling in her arm, and will be given a referral to the lymphedema specialists.    Cancer screening. She should undergo routine screening for women in her age group.     Healthy lifestyle. She should maintain a healthy weight with a BMI between 20-25. She should exercise at least 150 minutes weekly at moderate intensity. She should see the eye doctor every 1-2 years, and dentist every 6 months for cleanings.  She should not use any tobacco. She should minimize alcohol intake. If continuing to drink, should follow CDC recommendations for no more than 1 alcoholic drink/day for women.      Shannon was encouraged to let us know if she has any concerns and can return to see us if needed.     Darcy Bruce PA-C

## 2020-05-13 ENCOUNTER — TELEPHONE (OUTPATIENT)
Dept: OTOLARYNGOLOGY | Facility: CLINIC | Age: 80
End: 2020-05-13

## 2020-05-19 ENCOUNTER — VIRTUAL VISIT (OUTPATIENT)
Dept: CARDIOLOGY | Facility: CLINIC | Age: 80
End: 2020-05-19
Attending: INTERNAL MEDICINE
Payer: MEDICARE

## 2020-05-19 DIAGNOSIS — I25.10 CAD (CORONARY ARTERY DISEASE): ICD-10-CM

## 2020-05-19 DIAGNOSIS — I25.119 CORONARY ARTERY DISEASE INVOLVING NATIVE CORONARY ARTERY OF NATIVE HEART WITH ANGINA PECTORIS (H): Primary | ICD-10-CM

## 2020-05-19 DIAGNOSIS — I35.9 AORTIC VALVE DISORDER: ICD-10-CM

## 2020-05-19 PROCEDURE — 99442: CPT | Mod: ZP | Performed by: INTERNAL MEDICINE

## 2020-05-19 RX ORDER — ISOSORBIDE MONONITRATE 60 MG/1
60 TABLET, EXTENDED RELEASE ORAL DAILY
Qty: 90 TABLET | Refills: 3 | Status: SHIPPED | OUTPATIENT
Start: 2020-05-19 | End: 2021-01-01

## 2020-05-19 ASSESSMENT — PAIN SCALES - GENERAL: PAINLEVEL: NO PAIN (0)

## 2020-05-19 NOTE — PROGRESS NOTES
"Shannon Krishna is a 79 year old female who is being evaluated via a billable telephone visit.      The patient has been notified of following:     \"This telephone visit will be conducted via a call between you and your physician/provider. We have found that certain health care needs can be provided without the need for a physical exam.  This service lets us provide the care you need with a short phone conversation.  If a prescription is necessary we can send it directly to your pharmacy.  If lab work is needed we can place an order for that and you can then stop by our lab to have the test done at a later time.    Telephone visits are billed at different rates depending on your insurance coverage. During this emergency period, for some insurers they may be billed the same as an in-person visit.  Please reach out to your insurance provider with any questions.    If during the course of the call the physician/provider feels a telephone visit is not appropriate, you will not be charged for this service.\"    Patient has given verbal consent for Telephone visit?  Yes    What phone number would you like to be contacted at? [phone]    How would you like to obtain your AVS? Mail a copy     Total visit duration 14 minutes.       SUBJECTIVE:  Shannon Krishna is a 79 year old female who presents for follow-up.    Patient with chronic  of RCA.  This is well collateralized.  Patient uses PRN nitroglycerin for atypical chest pain.  Also known to have aortic stenosis and recent echo showed significant progression to moderate to severe aortic stenosis.     Patient been to [facility] for second opinion but she often gets.  She had reevaluation of aortic stenosis including a cardiopulmonary stress test which she failed.  They diagnosed her as having severe aortic stenosis and advised TAVR.  Patient did not want to go for any procedure as she is asymptomatic.    Currently patient states she is very active and has no " symptoms.  No intention of proceeding with any surgery or any other procedure.    Patient Active Problem List    Diagnosis Date Noted     Use of aromatase inhibitors 09/05/2019     Priority: Medium     Encounter for other specified aftercare 09/05/2019     Priority: Medium     Encounter for long-term (current) use of other medications 09/05/2019     Priority: Medium     Bilateral impacted cerumen 11/20/2017     Priority: Medium     Collapse of both external ear canals 11/20/2017     Priority: Medium     Age-related osteoporosis with current pathological fracture 08/16/2016     Priority: Medium     Breast cancer of upper-outer quadrant of right female breast (H) 12/16/2015     Priority: Medium     Osteoporosis 08/06/2013     Priority: Medium     Imo Update utility       Status post coronary angiogram 09/27/2012     Priority: Medium     Coronary artery disease 09/27/2012     Priority: Medium     S/P angioplasty 08/30/2012     Priority: Medium     CAD (coronary artery disease) 08/30/2012     Priority: Medium     Aortic sclerosis      Priority: Medium     Hypertension      Priority: Medium     Hyperlipidemia      Priority: Medium     Esophageal reflux      Priority: Medium     Adenocarcinoma of breast (H)      Priority: Medium     Breast cancer (H) 03/16/2011     Priority: Medium    .  Current Outpatient Medications   Medication Sig     amitriptyline (ELAVIL) 25 MG tablet Take 1-2 tablets by mouth At Bedtime.     aspirin EC 81 MG EC tablet Take 1 tablet by mouth daily.     atorvastatin (LIPITOR) 40 MG tablet Take 40 mg by mouth daily.     denosumab (PROLIA) 60 MG/ML SOLN injection Inject 60 mg Subcutaneous once     lisinopril (PRINIVIL,ZESTRIL) 10 MG tablet Take 1 tablet by mouth daily 10 mg total     metoprolol succinate ER (TOPROL-XL) 25 MG 24 hr tablet Take 1 tablet (25 mg) by mouth daily Please keep 5-14-19 clinic appt for further refills     Multiple Vitamin (MULTIVITAMINS PO) Take 1 tablet by mouth daily.      Omega-3 Fatty Acids (OMEGA 3 PO) Take  by mouth. 1 every two days     oxazepam (SERAX) 10 MG capsule Take 1 capsule by mouth daily.     ranitidine (ZANTAC) 150 MG tablet Take 1 tablet by mouth 2 times daily. In the morning and in the evening.      diclofenac (VOLTAREN) 1 % GEL topical gel Apply 4 grams to knees or 2 grams to hands four times daily using enclosed dosing card. (Patient not taking: Reported on 5/14/2019)     isosorbide mononitrate (IMDUR) 60 MG 24 hr tablet Take 1 tablet (60 mg) by mouth daily (Patient not taking: Reported on 2/27/2020)     nitroGLYcerin (NITROSTAT) 0.4 MG sublingual tablet Place 1 tablet (0.4 mg) under the tongue every 5 minutes as needed for chest pain (Patient not taking: Reported on 2/27/2020)     No current facility-administered medications for this visit.      Past Medical History:   Diagnosis Date     Adenocarcinoma of breast (H)      Adenomatous polyp of colon 2003     Aortic sclerosis     Mild to moderate sclerosis without significant stenosis.     Arthritis 2015     Coronary artery disease     Multivessel CAD,  of the mid RCA     Esophageal reflux      Hearing problem 2010     History of radiation therapy 2008     Hyperlipidemia      Hypertension      Osteoporosis      Past Surgical History:   Procedure Laterality Date     APPENDECTOMY      1950's     BIOPSY  2008    ultrasound guided biopsy of right breast     BREAST SURGERY       CARDIAC SURGERY  2008     COLONOSCOPY  2015     GENITOURINARY SURGERY  1970    cryosurgery on vagina     GENITOURINARY SURGERY  1975    conization     GI SURGERY  1960's    perforated ulcer     GYN SURGERY  1975     LUMPECTOMY BREAST  2008    breast cancer, done in [facility]     port-a-cath  2008    Removed in 2009     TONSILLECTOMY  1950     VASCULAR SURGERY       Allergies   Allergen Reactions     Nkda [No Known Drug Allergies]      Social History     Socioeconomic History     Marital status: Legally      Spouse name: Not on file      Number of children: Not on file     Years of education: Not on file     Highest education level: Not on file   Occupational History     Not on file   Social Needs     Financial resource strain: Not on file     Food insecurity     Worry: Not on file     Inability: Not on file     Transportation needs     Medical: Not on file     Non-medical: Not on file   Tobacco Use     Smoking status: Former Smoker     Packs/day: 1.00     Years: 30.00     Pack years: 30.00     Types: Cigarettes     Start date: 1960     Last attempt to quit: 1990     Years since quittin.4     Smokeless tobacco: Never Used   Substance and Sexual Activity     Alcohol use: No     Drug use: No     Sexual activity: Not Currently     Partners: Male     Birth control/protection: None   Lifestyle     Physical activity     Days per week: Not on file     Minutes per session: Not on file     Stress: Not on file   Relationships     Social connections     Talks on phone: Not on file     Gets together: Not on file     Attends Mandaen service: Not on file     Active member of club or organization: Not on file     Attends meetings of clubs or organizations: Not on file     Relationship status: Not on file     Intimate partner violence     Fear of current or ex partner: Not on file     Emotionally abused: Not on file     Physically abused: Not on file     Forced sexual activity: Not on file   Other Topics Concern     Parent/sibling w/ CABG, MI or angioplasty before 65F 55M? Not Asked   Social History Narrative     Not on file     Family History   Problem Relation Age of Onset     Family History Negative Other      Cancer Mother      Breast Cancer Mother      Diabetes Father      Heart Disease Father      Cerebrovascular Disease Father         ,, ,, ,, ,, ,, ,, ,, ,     Heart Disease Brother      Hyperlipidemia Brother      Heart Disease Brother      Diabetes Other      Glaucoma No family hx of      Macular Degeneration No family hx of       Amblyopia No family hx of      Coronary Artery Disease No family hx of           REVIEW OF SYSTEMS:  General: negative, fever, chills, night sweats  Skin: negative, acne, rash and scaling  Eyes: negative, double vision, eye pain and photophobia  Ears/Nose/Throat: negative, nasal congestion and purulent rhinorrhea  Respiratory: No dyspnea on exertion, No cough, No hemoptysis and negative  Cardiovascular: negative, palpitations, tachycardia, irregular heart beat, chest pain, exertional chest pain or pressure, paroxysmal nocturnal dyspnea, dyspnea on exertion and orthopnea         ASSESSMENT/PLAN:  Patient for follow-up.  Chronic  of RCA with collaterals and known aortic stenosis.  Last echocardiogram here showed moderate to severe aortic stenosis with mean gradient of 34 mmHg.  Patient been to [facility] for a second opinion.  She had reevaluation of aortic stenosis.  This included a cardiopulmonary stress test which she failed.  She was advised to undergo TAVR.  Patient refused as she is asymptomatic and has no plan to proceed with any surgical intervention.  Currently patient is active and asymptomatic.  Stated that she had no cardiac symptoms.    Discussed the fact that symptoms and aortic stenosis is a late presentation.  Patient aware of this fact and she is not interested in any procedure at this time  As she is not planning for any procedure there is no reason to repeat an echocardiogram at this time.    Discussed with the patient back to contact either me or the [place] group if she develops symptoms.  Discussed the symptoms to watch for.    Patient will return to clinic in 1 year with a repeat echocardiogram.  Patient needs a refill of isosorbide mononitrate.  Per orders.   Return to Clinic 1yr.

## 2020-05-19 NOTE — PATIENT INSTRUCTIONS
Patient Instructions:  Return to clinic in 1 year for a repeat echocardiogram and follow up appt with Dr. Harvey.  Refill of isosorbide mononitrate sent to pharmacy.    It was a pleasure to see you in the cardiology clinic today.    We are encouraging the use of MyChart to communicate with your Healthcare Provider.  If you have any questions, call  Manny Dixon LPN, at [phone].  Press Option #1 for the [facility], and then press Option #4 for nursing.  Cardiology Fax  : [phone]      If you have an urgent need after hours (8:00 am to 4:30 pm) please call [phone] and ask for the cardiology fellow on call.

## 2020-06-10 DIAGNOSIS — I25.10 CAD (CORONARY ARTERY DISEASE): ICD-10-CM

## 2020-06-12 RX ORDER — METOPROLOL SUCCINATE 25 MG/1
TABLET, EXTENDED RELEASE ORAL
Qty: 90 TABLET | Refills: 3 | Status: SHIPPED | OUTPATIENT
Start: 2020-06-12 | End: 2021-05-12

## 2020-07-06 NOTE — MR AVS SNAPSHOT
After Visit Summary   5/1/2017    Shannon Krishna    MRN: [id number]           Patient Information     Date Of Birth          1940        Visit Information        Provider Department      5/1/2017 12:50 PM Elizabeth Aleman, PT  [facility]        Today's Diagnoses     Right knee pain, unspecified chronicity    -  1       Follow-ups after your visit        Your next 10 appointments already scheduled     May 02, 2017 11:00 AM CDT   Ech Complete with UCECHCR2    [facility] Echo ([facility])    [address] [place] [id number] [phone]           1.  Please bring or wear a comfortable two-piece outfit. 2.  You may eat, drink and take your normal medicines. 3.  For any questions that cannot be answered, please contact the ordering physician            May 02, 2017  3:00 PM CDT   (Arrive by 2:45 PM)   Return Visit with NURYS Shin MD   [facility] ([facility])    [address] [place] [address]  3rd [facility] [id number] [phone]            May 08, 2017 12:00 PM CDT   [facility]ATH[facility]INE Extremity with Diana Peralta PT   [facility] Physical Therapy [facility] ([facility])    [address] [place] [address] 5th [facility] [id number] [phone]            May 24, 2017 11:30 AM CDT   (Arrive by 11:15 AM)   Return Visit with Davon Lara MD   [facility] Ear Nose and Throat ([facility])    [address] [place] [address]  4th [facility] [address]   [phone]            Aug 29, 2017 12:00 PM CDT   [facility] Draw with  [facility] DRAW   [facility] ([facility]    [address] [place] [address]  2nd [facility] [id number] [phone]            Aug 29, 2017 12:30 PM CDT   (Arrive by 12:15 PM)   Return Visit with Elias Caal MD   [facility] ([facility]    [address] [facility]  2nd [facility] [address] [phone]              Who to contact     If you have questions or need follow up information about today's clinic visit or your schedule please contact [facility] PHYSICAL THERAPY [facility] directly at [phone].  Normal or non-critical lab and imaging results will be communicated to you by MyChart, letter or phone within 4 business days after the clinic has received the results. If you do not hear from us within 7 days, please contact the clinic through MyChart or phone. If you have a critical or abnormal lab result, we will notify you by phone as soon as possible.  Submit refill requests through [facility] or call your pharmacy and they will forward the refill request to us. Please allow 3 business days for your refill to be completed.          Additional Information About Your Visit        MyChart Information     [facility] gives you secure access to your electronic health record. If you see a primary care provider, you can also send messages to your care team and make appointments. If you have questions, please call your primary care clinic.  If you do not have a primary care provider, please call [phone] and they will assist you.        Care EveryWhere ID     This is your Care EveryWhere ID. This could be used by other organizations to access your [place] medical records  [id number]         Blood Pressure from Last 3 Encounters:   04/18/17 127/64   02/15/17 103/62   08/16/16 107/61    Weight from Last 3 Encounters:   11/23/16 68.6 kg (151 lb 3.2 oz)   08/16/16 65.9 kg (145 lb 4.8 oz)   08/10/16 64.9 kg (143 lb 1.6 oz)              We Performed the Following     HC PT EVAL, MODERATE COMPLEXITY     [facility] CERT REPORT     THERAPEUTIC ACTIVITIES        Primary Care Provider Office Phone # Fax #    Valeria Reynolds [phone]       [address] [facility] [address]        Thank you!      Thank you for choosing [facility] PHYSICAL THERAPY [facility]  for your care. Our goal is always to provide you with excellent care. Hearing back from our patients is one way we can continue to improve our services. Please take a few minutes to complete the written survey that you may receive in the mail after your visit with us. Thank you!             Your Updated Medication List - Protect others around you: Learn how to safely use, store and throw away your medicines at www.disposemymeds.org.          This list is accurate as of: 5/1/17  6:12 PM.  Always use your most recent med list.                   Brand Name Dispense Instructions for use    amitriptyline 25 MG tablet    ELAVIL     Take 1-2 tablets by mouth At Bedtime.       anastrozole 1 MG tablet    ARIMIDEX    90 tablet    Take 1 tablet (1 mg) by mouth daily       aspirin 81 MG EC tablet      Take 1 tablet by mouth daily.       diclofenac 1 % Gel topical gel    VOLTAREN    100 g    Apply 4 grams to knees or 2 grams to hands four times daily using enclosed dosing card.       isosorbide mononitrate 60 MG 24 hr tablet    IMDUR    91 tablet    Take 1 tablet (60 mg) by mouth daily       LIPITOR 40 MG tablet   Generic drug:  atorvastatin      Take 40 mg by mouth daily.       lisinopril 10 MG tablet    PRINIVIL/ZESTRIL     Take 1 tablet by mouth daily 5 mg total       metoprolol 25 MG 24 hr tablet    TOPROL-XL    90 tablet    Take 1 tablet (25 mg) by mouth daily       MULTIVITAMINS PO      Take 1 tablet by mouth daily.       nitroglycerin 0.4 MG sublingual tablet    NITROSTAT    25 tablet    Place 1 tablet (0.4 mg) under the tongue every 5 minutes as needed for chest pain       OMEGA 3 PO      Take  by mouth. 1 every two days       oxazepam 10 MG capsule    SERAX     Take 1 capsule by mouth daily.       ranitidine 150 MG tablet    ZANTAC     Take 1 tablet by mouth 2 times daily. In the morning and in the evening.          Clear

## 2020-09-28 NOTE — PATIENT INSTRUCTIONS
1. You were seen in the ENT Clinic today by Dr. Nissen.  If you have any questions or concerns after your appointment, please call   - Option 1: ENT Clinic: [phone]   - Option 2: Melinda (Dr. Nissen's Nurse): [phone]  2.   Plan to return to clinic as needed for ear cleanings    Melinda Clemente LPN  [facility] - Otolaryngology

## 2020-09-29 ENCOUNTER — OFFICE VISIT (OUTPATIENT)
Dept: OTOLARYNGOLOGY | Facility: CLINIC | Age: 80
End: 2020-09-29
Payer: MEDICARE

## 2020-09-29 VITALS
HEIGHT: 65 IN | TEMPERATURE: 97.5 F | BODY MASS INDEX: 24.99 KG/M2 | OXYGEN SATURATION: 98 % | WEIGHT: 150 LBS | RESPIRATION RATE: 17 BRPM | HEART RATE: 77 BPM

## 2020-09-29 DIAGNOSIS — H61.23 BILATERAL IMPACTED CERUMEN: Primary | ICD-10-CM

## 2020-09-29 DIAGNOSIS — H93.8X3 PLUGGED FEELING IN EAR, BILATERAL: ICD-10-CM

## 2020-09-29 RX ORDER — FAMOTIDINE 10 MG
10 TABLET ORAL AT BEDTIME
COMMUNITY

## 2020-09-29 ASSESSMENT — MIFFLIN-ST. JEOR: SCORE: 1161.28

## 2020-09-29 ASSESSMENT — PAIN SCALES - GENERAL: PAINLEVEL: NO PAIN (0)

## 2020-09-29 NOTE — NURSING NOTE
"Chief Complaint   Patient presents with     RECHECK     ear cleaning      Pulse 77, temperature 97.5  F (36.4  C), resp. rate 17, height 1.651 m (5' 5\"), weight 68 kg (150 lb), SpO2 98 %, not currently breastfeeding.    Jaya Larson LPN    "

## 2020-09-29 NOTE — PROGRESS NOTES
Dear Valeria Reyez:    I had the pleasure of seeing Shannon Krishna in followup today at the [facility] Otol[facility]ology [facility].    CHIEF COMPLAINT: Bilateral ear plugged    HISTORY OF PRESENT ILLNESS: Patient is a 79-year-old in today for assessment of her ears.  She says for the past couple months her ears have felt full and plugged.  There is been no pain or drainage.  She feels the hearing is muffled.  She has a hearing aid in the right ear but does not wear that often.  She denies any change in balance, no dizziness or vertigo.  Further denies any dysphasia, hoarseness, facial paresthesias.    MEDICATIONS: Please refer to the detailed medication reconciliation performed by my nurse today, which I have reviewed and signed.     ALLERGIES:    Allergies   Allergen Reactions     Nkda [No Known Drug Allergies]        HABITS: Social History    Substance and Sexual Activity      Alcohol use: No     History   Smoking Status     Former Smoker     Packs/day: 1.00     Years: 30.00     Types: Cigarettes     Start date: 1/1/1960     Quit date: 1/1/1990   Smokeless Tobacco     Never Used         PAST MEDICAL HISTORY:  Please see today's intake form (for the remainder of the PMH) which I reviewed and signed.  Past Medical History:   Diagnosis Date     Adenocarcinoma of breast (H)      Adenomatous polyp of colon 2003     Aortic sclerosis     Mild to moderate sclerosis without significant stenosis.     Arthritis 2015     Coronary artery disease     Multivessel CAD,  of the mid RCA     Esophageal reflux      Hearing problem 2010     History of radiation therapy 2008     Hyperlipidemia      Hypertension      Osteoporosis        FAMILY HISTORY/SOCIAL HISTORY:    Family History   Problem Relation Age of Onset     Family History Negative Other      Cancer Mother      Breast Cancer Mother      Diabetes Father      Heart Disease Father      Cerebrovascular Disease Father         ,, ,, ,, ,, ,, ,, ,, ,      Heart Disease Brother      Hyperlipidemia Brother      Heart Disease Brother      Diabetes Other      Glaucoma No family hx of      Macular Degeneration No family hx of      Amblyopia No family hx of      Coronary Artery Disease No family hx of       Social History     Socioeconomic History     Marital status: Legally      Spouse name: Not on file     Number of children: Not on file     Years of education: Not on file     Highest education level: Not on file   Occupational History     Not on file   Social Needs     Financial resource strain: Not on file     Food insecurity     Worry: Not on file     Inability: Not on file     Transportation needs     Medical: Not on file     Non-medical: Not on file   Tobacco Use     Smoking status: Former Smoker     Packs/day: 1.00     Years: 30.00     Pack years: 30.00     Types: Cigarettes     Start date: 1960     Last attempt to quit: 1990     Years since quittin.7     Smokeless tobacco: Never Used   Substance and Sexual Activity     Alcohol use: No     Drug use: No     Sexual activity: Not Currently     Partners: Male     Birth control/protection: None   Lifestyle     Physical activity     Days per week: Not on file     Minutes per session: Not on file     Stress: Not on file   Relationships     Social connections     Talks on phone: Not on file     Gets together: Not on file     Attends Protestant service: Not on file     Active member of club or organization: Not on file     Attends meetings of clubs or organizations: Not on file     Relationship status: Not on file     Intimate partner violence     Fear of current or ex partner: Not on file     Emotionally abused: Not on file     Physically abused: Not on file     Forced sexual activity: Not on file   Other Topics Concern     Parent/sibling w/ CABG, MI or angioplasty before 65F 55M? Not Asked   Social History Narrative     Not on file       REVIEW OF SYSTEMS: Patient Supplied Answers to Review of  Systems   ENT ROS 11/2/2018   Ears, Nose, Throat Hearing loss   Musculoskeletal -            The remainder of the 10 point ROS is negative    PHYSICIAL EXAMINATION:  Constitutional: The patient was well-groomed and in no acute distress.   Skin: Warm and pink.  Psychiatric: The patient's affect was calm, cooperative, and appropriate.   Respiratory: Breathing comfortably without stridor or exertion of accessory muscles.  Eyes: Pupils were equal and reactive. Extraocular movement intact.   Head: Normocephalic and atraumatic. No lesions or scars.  Ears: Both ears examined she has small ear canals and impacted cerumen bilateral.  She was placed under the microscope and under high-power magnification the right side initially was cleaned with curettes, right angled hook, and suction, using larger smaller suction tips.  Tedious effort was needed to clean the impacted material.  After removal, tympanic membrane and middle ear look normal.  The left ear also had impacted material which was cleaned using similar instruments and microscope.  After cleaning, tympanic membrane and middle ears look normal.  Nose: Sinuses were nontender. Anterior rhinoscopy revealed midline septum and absence of purulence or polyps.  Oral Cavity: Normal tongue, floor of mouth, buccal mucosa, and palate. No abnormal lymph tissue in the oropharynx.   Neck: The parotid is soft without masses. Supple with normal laryngeal and tracheal landmarks.   Lymphatic: There is no palpable lymphadenopathy or other masses in the neck.   Neurologic: Alert and oriented x 3. Cranial nerves III-XI within normal limits. Voice quality normal.  Cerebellar Function Tests:  Grossly normal    Audiogram: Tuning forks normal after cleaning.    IMPRESSION AND PLAN:   1. Bilateral impacted cerumen: Cleaned today with relief of symptoms.  No further treatment needed, monitor.  2. Bilateral plugged ears: Secondary to impacted cerumen, symptoms improved after cleaning, no  further treatment needed, monitor.    Thank you very much for the opportunity to participate in the care of your patient.    Rick L Nissen MD

## 2020-12-14 ENCOUNTER — TELEPHONE (OUTPATIENT)
Dept: CARDIOLOGY | Facility: CLINIC | Age: 80
End: 2020-12-14

## 2020-12-14 NOTE — TELEPHONE ENCOUNTER
[facility] Call Center    Phone Message    May a detailed message be left on voicemail: yes     Reason for Call: Other: Pt calling because she thinks she should get the covid vaccine, and wanted to discuss that with . Please call back to discuss.     Action Taken: Message routed to:  [facility] ([facility] cardio    Travel Screening: Not Applicable

## 2020-12-15 NOTE — TELEPHONE ENCOUNTER
Spoke to patient.   She wants to speak to Dr. Harvey about her being at high risk for covid and wants him to advise her.   Explained that cardiologists will not be administering the vaccine and that we do not currently have it. Explained the different tiers of people that will receive in and the order. Suggested patient contact her PCP to inquire about getting it. Patient refused and begged to have an appointment with Dr. Harvey. Given telephone appt next week, told her that she may be disappointed as he will likely tell her same information.

## 2020-12-18 NOTE — TELEPHONE ENCOUNTER
[facility] Call Center    Phone Message    May a detailed message be left on voicemail: yes     Reason for Call: Other: Pt returning missed call from Manny. Please call back again.     Action Taken: Message routed to:  [facility] cardio    Travel Screening: Not Applicable

## 2020-12-22 ENCOUNTER — VIRTUAL VISIT (OUTPATIENT)
Dept: CARDIOLOGY | Facility: CLINIC | Age: 80
End: 2020-12-22
Attending: INTERNAL MEDICINE
Payer: MEDICARE

## 2020-12-22 DIAGNOSIS — I25.82 CHRONIC TOTAL OCCLUSION OF CORONARY ARTERY: Primary | ICD-10-CM

## 2020-12-22 DIAGNOSIS — I35.0 NONRHEUMATIC AORTIC (VALVE) STENOSIS: ICD-10-CM

## 2020-12-22 PROCEDURE — 99443 PR PHYSICIAN TELEPHONE EVALUATION 21-30 MIN: CPT | Mod: 95 | Performed by: INTERNAL MEDICINE

## 2020-12-22 NOTE — PROGRESS NOTES
"Shannon Krishna is a 80 year old female who is being evaluated via a billable telephone visit.      The patient has been notified of following:     \"This telephone visit will be conducted via a call between you and your physician/provider. We have found that certain health care needs can be provided without the need for a physical exam.  This service lets us provide the care you need with a short phone conversation.  If a prescription is necessary we can send it directly to your pharmacy.  If lab work is needed we can place an order for that and you can then stop by our lab to have the test done at a later time.    Telephone visits are billed at different rates depending on your insurance coverage. During this emergency period, for some insurers they may be billed the same as an in-person visit.  Please reach out to your insurance provider with any questions.    If during the course of the call the physician/provider feels a telephone visit is not appropriate, you will not be charged for this service.\"    Patient has given verbal consent for Telephone visit?  Yes    What phone number would you like to be contacted at? [phone]    How would you like to obtain your AVS? Mail a copy     Total visit duration 12 minutes.       SUBJECTIVE:  Shannon Krishna is a 80 year old female who presents for follow-up.  Patient known to have  of RCA.  This is well collateralized.  Stress test shows no ischemia.  Patient does get intermittent atypical chest pain for which she takes nitroglycerin.  Patient was also evaluated at [facility] for this and was advised no intervention.  Her second cardiac problem is aortic stenosis.  This was reported as moderate to severe in our system.  Patient been to [facility] for a second opinion.  There she had a cardiopulmonary stress test which she failed.  She was advised to have a TAVR for severe aortic stenosis but patient declined as she is asymptomatic.  Currently patient is " fairly active and asymptomatic.  No symptoms related to CAD or aortic stenosis.    Patient Active Problem List    Diagnosis Date Noted     Use of aromatase inhibitors 09/05/2019     Priority: Medium     Encounter for other specified aftercare 09/05/2019     Priority: Medium     Encounter for long-term (current) use of other medications 09/05/2019     Priority: Medium     Bilateral impacted cerumen 11/20/2017     Priority: Medium     Collapse of both external ear canals 11/20/2017     Priority: Medium     Age-related osteoporosis with current pathological fracture 08/16/2016     Priority: Medium     Breast cancer of upper-outer quadrant of right female breast (H) 12/16/2015     Priority: Medium     Osteoporosis 08/06/2013     Priority: Medium     Imo Update utility       Status post coronary angiogram 09/27/2012     Priority: Medium     Coronary artery disease 09/27/2012     Priority: Medium     S/P angioplasty 08/30/2012     Priority: Medium     CAD (coronary artery disease) 08/30/2012     Priority: Medium     Aortic sclerosis      Priority: Medium     Hypertension      Priority: Medium     Hyperlipidemia      Priority: Medium     Esophageal reflux      Priority: Medium     Adenocarcinoma of breast (H)      Priority: Medium     Breast cancer (H) 03/16/2011     Priority: Medium    .  Current Outpatient Medications   Medication Sig     amitriptyline (ELAVIL) 25 MG tablet Take 1-2 tablets by mouth At Bedtime.     aspirin EC 81 MG EC tablet Take 1 tablet by mouth daily.     atorvastatin (LIPITOR) 40 MG tablet Take 40 mg by mouth daily.     cimetidine (TAGAMET) 200 MG tablet Take 200 mg by mouth every morning     diclofenac (VOLTAREN) 1 % GEL topical gel Apply 4 grams to knees or 2 grams to hands four times daily using enclosed dosing card.     famotidine (PEPCID) 10 MG tablet Take 10 mg by mouth At Bedtime     isosorbide mononitrate (IMDUR) 60 MG 24 hr tablet Take 1 tablet (60 mg) by mouth daily     lisinopril  (PRINIVIL,ZESTRIL) 10 MG tablet Take 1 tablet by mouth daily 10 mg total     metoprolol succinate ER (TOPROL-XL) 25 MG 24 hr tablet TAKE 1 TABLET BY MOUTH DAILY     nitroGLYcerin (NITROSTAT) 0.4 MG sublingual tablet Place 1 tablet (0.4 mg) under the tongue every 5 minutes as needed for chest pain     oxazepam (SERAX) 10 MG capsule Take 1 capsule by mouth daily.     denosumab (PROLIA) 60 MG/ML SOLN injection Inject 60 mg Subcutaneous once     Multiple Vitamin (MULTIVITAMINS PO) Take 1 tablet by mouth daily.     Omega-3 Fatty Acids (OMEGA 3 PO) Take  by mouth. 1 every two days     No current facility-administered medications for this visit.      Past Medical History:   Diagnosis Date     Adenocarcinoma of breast (H)      Adenomatous polyp of colon 2003     Aortic sclerosis     Mild to moderate sclerosis without significant stenosis.     Arthritis 2015     Coronary artery disease     Multivessel CAD,  of the mid RCA     Esophageal reflux      Hearing problem 2010     History of radiation therapy 2008     Hyperlipidemia      Hypertension      Osteoporosis      Past Surgical History:   Procedure Laterality Date     APPENDECTOMY      1950's     BIOPSY  2008    ultrasound guided biopsy of right breast     BREAST SURGERY       CARDIAC SURGERY  2008     COLONOSCOPY  2015     GENITOURINARY SURGERY  1970    cryosurgery on vagina     GENITOURINARY SURGERY  1975    conization     GI SURGERY  1960's    perforated ulcer     GYN SURGERY  1975     LUMPECTOMY BREAST  2008    breast cancer, done in [facility]     port-a-cath  2008    Removed in 2009     TONSILLECTOMY  1950     VASCULAR SURGERY       Allergies   Allergen Reactions     Nkda [No Known Drug Allergies]      Social History     Socioeconomic History     Marital status: Legally      Spouse name: Not on file     Number of children: Not on file     Years of education: Not on file     Highest education level: Not on file   Occupational History     Not on file    Social Needs     Financial resource strain: Not on file     Food insecurity     Worry: Not on file     Inability: Not on file     Transportation needs     Medical: Not on file     Non-medical: Not on file   Tobacco Use     Smoking status: Former Smoker     Packs/day: 1.00     Years: 30.00     Pack years: 30.00     Types: Cigarettes     Start date: 1960     Quit date: 1990     Years since quittin.9     Smokeless tobacco: Never Used   Substance and Sexual Activity     Alcohol use: No     Drug use: No     Sexual activity: Not Currently     Partners: Male     Birth control/protection: None   Lifestyle     Physical activity     Days per week: Not on file     Minutes per session: Not on file     Stress: Not on file   Relationships     Social connections     Talks on phone: Not on file     Gets together: Not on file     Attends [employer] service: Not on file     Active member of club or organization: Not on file     Attends meetings of clubs or organizations: Not on file     Relationship status: Not on file     Intimate partner violence     Fear of current or ex partner: Not on file     Emotionally abused: Not on file     Physically abused: Not on file     Forced sexual activity: Not on file   Other Topics Concern     Parent/sibling w/ CABG, MI or angioplasty before 65F 55M? Not Asked   Social History Narrative     Not on file     Family History   Problem Relation Age of Onset     Family History Negative Other      Cancer Mother      Breast Cancer Mother      Diabetes Father      Heart Disease Father      Cerebrovascular Disease Father         ,, ,, ,, ,, ,, ,, ,, ,     Heart Disease Brother      Hyperlipidemia Brother      Heart Disease Brother      Diabetes Other      Glaucoma No family hx of      Macular Degeneration No family hx of      Amblyopia No family hx of      Coronary Artery Disease No family hx of           REVIEW OF SYSTEMS:  Unremarkable.         ASSESSMENT/PLAN:  Patient here for follow-up.    of RCA and aortic stenosis.  RCA  is chronic with well collateralized vessel and no ischemia on stress test.  Intermittently patient gets chest pain for which she take nitroglycerin.  Though the pain is very atypical.  For this she had a second opinion at [facility] and no intervention was advised.  Her second cardiac problem is aortic stenosis.  Recent echocardiogram in our system showed progression to moderate to severe aortic stenosis.  Patient been to [facility] for a second opinion as usual.  She had a cardiopulmonary stress test which she failed.  She was advised to have a TAVR.  Patient declined TAVR as she is asymptomatic.  Patient remain active and asymptomatic.  Her last echocardiogram in our system showed normal biventricular size and function.  Suggested a repeat echocardiogram to reassess the stenosis.  Patient declined and expressed the opinion that she do not want to do anything now.  She requested about COVID-19 vaccine.  She was advised to contact her PCP.  Meanwhile patient is advised to continue her current medications.  Per orders.   Return to Clinic 1 year with an echocardiogram if patient is willing.

## 2020-12-22 NOTE — PATIENT INSTRUCTIONS
Contact your primary care provider regarding Covid vaccine questions.     Follow up in one year with an echocardiogram if you are willing to do it, per your discussion with Dr. Harvey.  You will receive a scheduling reminder in advance.

## 2020-12-22 NOTE — LETTER
"12/22/2020      RE: Shannon Krishna  [address]  [facility] [address]       Dear Colleague,    Thank you for the opportunity to participate in the care of your patient, Shannon Krishna, at the [facility]. Please see a copy of my visit note below.    Shannon Krishna is a 80 year old female who is being evaluated via a billable telephone visit.      The patient has been notified of following:     \"This telephone visit will be conducted via a call between you and your physician/provider. We have found that certain health care needs can be provided without the need for a physical exam.  This service lets us provide the care you need with a short phone conversation.  If a prescription is necessary we can send it directly to your pharmacy.  If lab work is needed we can place an order for that and you can then stop by our lab to have the test done at a later time.    Telephone visits are billed at different rates depending on your insurance coverage. During this emergency period, for some insurers they may be billed the same as an in-person visit.  Please reach out to your insurance provider with any questions.    If during the course of the call the physician/provider feels a telephone visit is not appropriate, you will not be charged for this service.\"    Patient has given verbal consent for Telephone visit?  Yes    What phone number would you like to be contacted at? [phone]    How would you like to obtain your AVS? Mail a copy     Total visit duration 12 minutes.       SUBJECTIVE:  Shannon Krishna is a 80 year old female who presents for follow-up.  Patient known to have  of RCA.  This is well collateralized.  Stress test shows no ischemia.  Patient does get intermittent atypical chest pain for which she takes nitroglycerin.  Patient was also evaluated at [facility] for this and was advised no " intervention.  Her second cardiac problem is aortic stenosis.  This was reported as moderate to severe in our system.  Patient been to [facility] for a second opinion.  There she had a cardiopulmonary stress test which she failed.  She was advised to have a TAVR for severe aortic stenosis but patient declined as she is asymptomatic.  Currently patient is fairly active and asymptomatic.  No symptoms related to CAD or aortic stenosis.    Patient Active Problem List    Diagnosis Date Noted     Use of aromatase inhibitors 09/05/2019     Priority: Medium     Encounter for other specified aftercare 09/05/2019     Priority: Medium     Encounter for long-term (current) use of other medications 09/05/2019     Priority: Medium     Bilateral impacted cerumen 11/20/2017     Priority: Medium     Collapse of both external ear canals 11/20/2017     Priority: Medium     Age-related osteoporosis with current pathological fracture 08/16/2016     Priority: Medium     Breast cancer of upper-outer quadrant of right female breast (H) 12/16/2015     Priority: Medium     Osteoporosis 08/06/2013     Priority: Medium     Imo Update utility       Status post coronary angiogram 09/27/2012     Priority: Medium     Coronary artery disease 09/27/2012     Priority: Medium     S/P angioplasty 08/30/2012     Priority: Medium     CAD (coronary artery disease) 08/30/2012     Priority: Medium     Aortic sclerosis      Priority: Medium     Hypertension      Priority: Medium     Hyperlipidemia      Priority: Medium     Esophageal reflux      Priority: Medium     Adenocarcinoma of breast (H)      Priority: Medium     Breast cancer (H) 03/16/2011     Priority: Medium    .  Current Outpatient Medications   Medication Sig     amitriptyline (ELAVIL) 25 MG tablet Take 1-2 tablets by mouth At Bedtime.     aspirin EC 81 MG EC tablet Take 1 tablet by mouth daily.     atorvastatin (LIPITOR) 40 MG tablet Take 40 mg by mouth daily.     cimetidine (TAGAMET) 200 MG  tablet Take 200 mg by mouth every morning     diclofenac (VOLTAREN) 1 % GEL topical gel Apply 4 grams to knees or 2 grams to hands four times daily using enclosed dosing card.     famotidine (PEPCID) 10 MG tablet Take 10 mg by mouth At Bedtime     isosorbide mononitrate (IMDUR) 60 MG 24 hr tablet Take 1 tablet (60 mg) by mouth daily     lisinopril (PRINIVIL,ZESTRIL) 10 MG tablet Take 1 tablet by mouth daily 10 mg total     metoprolol succinate ER (TOPROL-XL) 25 MG 24 hr tablet TAKE 1 TABLET BY MOUTH DAILY     nitroGLYcerin (NITROSTAT) 0.4 MG sublingual tablet Place 1 tablet (0.4 mg) under the tongue every 5 minutes as needed for chest pain     oxazepam (SERAX) 10 MG capsule Take 1 capsule by mouth daily.     denosumab (PROLIA) 60 MG/ML SOLN injection Inject 60 mg Subcutaneous once     Multiple Vitamin (MULTIVITAMINS PO) Take 1 tablet by mouth daily.     Omega-3 Fatty Acids (OMEGA 3 PO) Take  by mouth. 1 every two days     No current facility-administered medications for this visit.      Past Medical History:   Diagnosis Date     Adenocarcinoma of breast (H)      Adenomatous polyp of colon 2003     Aortic sclerosis     Mild to moderate sclerosis without significant stenosis.     Arthritis 2015     Coronary artery disease     Multivessel CAD,  of the mid RCA     Esophageal reflux      Hearing problem 2010     History of radiation therapy 2008     Hyperlipidemia      Hypertension      Osteoporosis      Past Surgical History:   Procedure Laterality Date     APPENDECTOMY      1950's     BIOPSY  2008    ultrasound guided biopsy of right breast     BREAST SURGERY       CARDIAC SURGERY  2008     COLONOSCOPY  2015     GENITOURINARY SURGERY  1970    cryosurgery on vagina     GENITOURINARY SURGERY  1975    conization     GI SURGERY  1960's    perforated ulcer     GYN SURGERY  1975     LUMPECTOMY BREAST  2008    breast cancer, done in [facility]     port-a-cath  2008    Removed in 2009     TONSILLECTOMY  1950     VASCULAR  SURGERY       Allergies   Allergen Reactions     Nkda [No Known Drug Allergies]      Social History     Socioeconomic History     Marital status: Legally      Spouse name: Not on file     Number of children: Not on file     Years of education: Not on file     Highest education level: Not on file   Occupational History     Not on file   Social Needs     Financial resource strain: Not on file     Food insecurity     Worry: Not on file     Inability: Not on file     Transportation needs     Medical: Not on file     Non-medical: Not on file   Tobacco Use     Smoking status: Former Smoker     Packs/day: 1.00     Years: 30.00     Pack years: 30.00     Types: Cigarettes     Start date: 1960     Quit date: 1990     Years since quittin.9     Smokeless tobacco: Never Used   Substance and Sexual Activity     Alcohol use: No     Drug use: No     Sexual activity: Not Currently     Partners: Male     Birth control/protection: None   Lifestyle     Physical activity     Days per week: Not on file     Minutes per session: Not on file     Stress: Not on file   Relationships     Social connections     Talks on phone: Not on file     Gets together: Not on file     Attends [employer] service: Not on file     Active member of club or organization: Not on file     Attends meetings of clubs or organizations: Not on file     Relationship status: Not on file     Intimate partner violence     Fear of current or ex partner: Not on file     Emotionally abused: Not on file     Physically abused: Not on file     Forced sexual activity: Not on file   Other Topics Concern     Parent/sibling w/ CABG, MI or angioplasty before 65F 55M? Not Asked   Social History Narrative     Not on file     Family History   Problem Relation Age of Onset     Family History Negative Other      Cancer Mother      Breast Cancer Mother      Diabetes Father      Heart Disease Father      Cerebrovascular Disease Father         ,, ,, ,, ,, ,, ,, ,, ,      Heart Disease Brother      Hyperlipidemia Brother      Heart Disease Brother      Diabetes Other      Glaucoma No family hx of      Macular Degeneration No family hx of      Amblyopia No family hx of      Coronary Artery Disease No family hx of           REVIEW OF SYSTEMS:  Unremarkable.         ASSESSMENT/PLAN:  Patient here for follow-up.   of RCA and aortic stenosis.  RCA  is chronic with well collateralized vessel and no ischemia on stress test.  Intermittently patient gets chest pain for which she take nitroglycerin.  Though the pain is very atypical.  For this she had a second opinion at [facility] and no intervention was advised.  Her second cardiac problem is aortic stenosis.  Recent echocardiogram in our system showed progression to moderate to severe aortic stenosis.  Patient been to [facility] for a second opinion as usual.  She had a cardiopulmonary stress test which she failed.  She was advised to have a TAVR.  Patient declined TAVR as she is asymptomatic.  Patient remain active and asymptomatic.  Her last echocardiogram in our system showed normal biventricular size and function.  Suggested a repeat echocardiogram to reassess the stenosis.  Patient declined and expressed the opinion that she do not want to do anything now.  She requested about COVID-19 vaccine.  She was advised to contact her PCP.  Meanwhile patient is advised to continue her current medications.  Per orders.   Return to Clinic 1 year with an echocardiogram if patient is willing.              Please do not hesitate to contact me if you have any questions/concerns.     Sincerely,     NURYS Shin MD

## 2021-01-01 ENCOUNTER — TELEPHONE (OUTPATIENT)
Dept: OBGYN | Facility: CLINIC | Age: 81
End: 2021-01-01

## 2021-01-01 ENCOUNTER — TELEPHONE (OUTPATIENT)
Dept: CARDIOLOGY | Facility: CLINIC | Age: 81
End: 2021-01-01

## 2021-01-01 ENCOUNTER — HEALTH MAINTENANCE LETTER (OUTPATIENT)
Age: 81
End: 2021-01-01

## 2021-01-01 ENCOUNTER — OFFICE VISIT (OUTPATIENT)
Dept: OTOLARYNGOLOGY | Facility: CLINIC | Age: 81
End: 2021-01-01
Payer: MEDICARE

## 2021-01-01 VITALS — BODY MASS INDEX: 23.85 KG/M2 | HEART RATE: 78 BPM | TEMPERATURE: 97.4 F | OXYGEN SATURATION: 98 % | WEIGHT: 143.3 LBS

## 2021-01-01 DIAGNOSIS — H93.8X3 EAR PRESSURE, BILATERAL: Primary | ICD-10-CM

## 2021-01-01 DIAGNOSIS — H90.3 BILATERAL SENSORINEURAL HEARING LOSS: ICD-10-CM

## 2021-01-01 DIAGNOSIS — I25.10 CAD (CORONARY ARTERY DISEASE): ICD-10-CM

## 2021-01-01 DIAGNOSIS — H61.23 EXCESSIVE CERUMEN IN BOTH EAR CANALS: ICD-10-CM

## 2021-01-01 PROCEDURE — 99213 OFFICE O/P EST LOW 20 MIN: CPT | Performed by: OTOLARYNGOLOGY

## 2021-01-01 RX ORDER — METOPROLOL SUCCINATE 25 MG/1
25 TABLET, EXTENDED RELEASE ORAL DAILY
Qty: 90 TABLET | Refills: 3 | Status: SHIPPED | OUTPATIENT
Start: 2021-01-01 | End: 2022-01-01

## 2021-01-01 RX ORDER — ISOSORBIDE MONONITRATE 60 MG/1
60 TABLET, EXTENDED RELEASE ORAL DAILY
Qty: 90 TABLET | Refills: 0 | Status: SHIPPED | OUTPATIENT
Start: 2021-01-01 | End: 2021-01-01

## 2021-01-01 RX ORDER — ISOSORBIDE MONONITRATE 60 MG/1
60 TABLET, EXTENDED RELEASE ORAL DAILY
Qty: 90 TABLET | Refills: 0 | Status: SHIPPED | OUTPATIENT
Start: 2021-01-01 | End: 2022-01-01

## 2021-01-01 ASSESSMENT — PAIN SCALES - GENERAL: PAINLEVEL: NO PAIN (0)

## 2021-01-29 ENCOUNTER — TELEPHONE (OUTPATIENT)
Dept: CARDIOLOGY | Facility: CLINIC | Age: 81
End: 2021-01-29

## 2021-01-29 NOTE — TELEPHONE ENCOUNTER
[facility] Call Center    Phone Message    May a detailed message be left on voicemail: yes     Reason for Call: Other: Pt got a letter to have her contact her Dr about the vaccine. Please reach out to pt to discuss     Action Taken: Message routed to:  [facility] C[facility]    Travel Screening: Not Applicable

## 2021-01-29 NOTE — TELEPHONE ENCOUNTER
LM for patient to call the Covid vaccine scheduling line at [phone] for updated appointments to receive vaccines and to reach our to PCP for further questions.

## 2021-02-09 ENCOUNTER — IMMUNIZATION (OUTPATIENT)
Dept: PEDIATRICS | Facility: CLINIC | Age: 81
End: 2021-02-09
Payer: MEDICARE

## 2021-02-09 PROCEDURE — 91300 PR COVID VAC PFIZER DIL RECON 30 MCG/0.3 ML IM: CPT

## 2021-02-09 PROCEDURE — 0001A PR COVID VAC PFIZER DIL RECON 30 MCG/0.3 ML IM: CPT

## 2021-03-02 ENCOUNTER — IMMUNIZATION (OUTPATIENT)
Dept: PEDIATRICS | Facility: CLINIC | Age: 81
End: 2021-03-02
Attending: INTERNAL MEDICINE
Payer: MEDICARE

## 2021-03-02 PROCEDURE — 91300 PR COVID VAC PFIZER DIL RECON 30 MCG/0.3 ML IM: CPT

## 2021-03-02 PROCEDURE — 0002A PR COVID VAC PFIZER DIL RECON 30 MCG/0.3 ML IM: CPT

## 2021-03-10 ENCOUNTER — ANCILLARY PROCEDURE (OUTPATIENT)
Dept: MAMMOGRAPHY | Facility: CLINIC | Age: 81
End: 2021-03-10
Payer: MEDICARE

## 2021-03-10 DIAGNOSIS — Z12.31 VISIT FOR SCREENING MAMMOGRAM: ICD-10-CM

## 2021-03-10 PROCEDURE — 77067 SCR MAMMO BI INCL CAD: CPT | Mod: GC | Performed by: RADIOLOGY

## 2021-03-10 PROCEDURE — 77063 BREAST TOMOSYNTHESIS BI: CPT | Mod: GC | Performed by: RADIOLOGY

## 2021-03-11 NOTE — PATIENT INSTRUCTIONS
1. You were seen in the ENT Clinic today by Dr. Nissen.  If you have any questions or concerns after your appointment, please call   - Option 1: ENT Clinic: [phone]   - Option 2: Melinda (Dr. Nissen's Nurse): [phone]    2.   Plan to return to clinic in 6 month    Melinda Clemente LPN  [facility] - Otolaryngology

## 2021-03-27 ENCOUNTER — HEALTH MAINTENANCE LETTER (OUTPATIENT)
Age: 81
End: 2021-03-27

## 2021-03-30 ENCOUNTER — OFFICE VISIT (OUTPATIENT)
Dept: OTOLARYNGOLOGY | Facility: CLINIC | Age: 81
End: 2021-03-30
Payer: MEDICARE

## 2021-03-30 VITALS — WEIGHT: 150.79 LBS | TEMPERATURE: 97.8 F | BODY MASS INDEX: 25.09 KG/M2

## 2021-03-30 DIAGNOSIS — H61.23 EXCESSIVE CERUMEN IN BOTH EAR CANALS: Primary | ICD-10-CM

## 2021-03-30 DIAGNOSIS — H93.8X3 EAR PRESSURE, BILATERAL: ICD-10-CM

## 2021-03-30 PROCEDURE — 92504 EAR MICROSCOPY EXAMINATION: CPT | Performed by: OTOLARYNGOLOGY

## 2021-03-30 PROCEDURE — 99213 OFFICE O/P EST LOW 20 MIN: CPT | Mod: 25 | Performed by: OTOLARYNGOLOGY

## 2021-03-30 ASSESSMENT — PAIN SCALES - GENERAL: PAINLEVEL: NO PAIN (0)

## 2021-03-30 NOTE — LETTER
3/30/2021       RE: Shannon Krishna  [address]  [facility] [address]     Dear Colleague,    Thank you for referring your patient, Shannon Krishna, to the [facility] [place] at [facility]. Please see a copy of my visit note below.    Dear Valeria Reyez:    I had the pleasure of seeing Shannon Krishna in followup today at the [facility].    CHIEF COMPLAINT: Bilateral ear pressure, excessive cerumen    HISTORY OF PRESENT ILLNESS: Patient is a 80-year-old seen today for follow-up.  She comes in every 6 months for cleaning.  She does have bilateral ear pressure, no pain or drainage.  Hearing seems a little muffled.  No dizziness.  No dysphagia, hoarseness, facial paresthesias    MEDICATIONS: Please refer to the detailed medication reconciliation performed by my nurse today, which I have reviewed and signed.     ALLERGIES:    Allergies   Allergen Reactions     Nkda [No Known Drug Allergies]        HABITS: Social History    Substance and Sexual Activity      Alcohol use: No     History   Smoking Status     Former Smoker     Packs/day: 1.00     Years: 30.00     Types: Cigarettes     Start date: 1/1/1960     Quit date: 1/1/1990   Smokeless Tobacco     Never Used         PAST MEDICAL HISTORY:  Please see today's intake form (for the remainder of the PMH) which I reviewed and signed.  Past Medical History:   Diagnosis Date     Adenocarcinoma of breast (H)      Adenomatous polyp of colon 2003     Aortic sclerosis     Mild to moderate sclerosis without significant stenosis.     Arthritis 2015     Coronary artery disease     Multivessel CAD,  of the mid RCA     Esophageal reflux      Hearing problem 2010     History of radiation therapy 2008     Hyperlipidemia      Hypertension      Osteoporosis        FAMILY HISTORY/SOCIAL HISTORY:    Family History   Problem Relation  Age of Onset     Family History Negative Other      Cancer Mother      Breast Cancer Mother      Diabetes Father      Heart Disease Father      Cerebrovascular Disease Father         ,, ,, ,, ,, ,, ,, ,, ,     Heart Disease Brother      Hyperlipidemia Brother      Heart Disease Brother      Diabetes Other      Glaucoma No family hx of      Macular Degeneration No family hx of      Amblyopia No family hx of      Coronary Artery Disease No family hx of       Social History     Socioeconomic History     Marital status: Legally      Spouse name: Not on file     Number of children: Not on file     Years of education: Not on file     Highest education level: Not on file   Occupational History     Not on file   Social Needs     Financial resource strain: Not on file     Food insecurity     Worry: Not on file     Inability: Not on file     Transportation needs     Medical: Not on file     Non-medical: Not on file   Tobacco Use     Smoking status: Former Smoker     Packs/day: 1.00     Years: 30.00     Pack years: 30.00     Types: Cigarettes     Start date: 1960     Quit date: 1990     Years since quittin.2     Smokeless tobacco: Never Used   Substance and Sexual Activity     Alcohol use: No     Drug use: No     Sexual activity: Not Currently     Partners: Male     Birth control/protection: None   Lifestyle     Physical activity     Days per week: Not on file     Minutes per session: Not on file     Stress: Not on file   Relationships     Social connections     Talks on phone: Not on file     Gets together: Not on file     Attends [employer] service: Not on file     Active member of club or organization: Not on file     Attends meetings of clubs or organizations: Not on file     Relationship status: Not on file     Intimate partner violence     Fear of current or ex partner: Not on file     Emotionally abused: Not on file     Physically abused: Not on file     Forced sexual activity: Not on file   Other  Topics Concern     Parent/sibling w/ CABG, MI or angioplasty before 65F 55M? Not Asked   Social History Narrative     Not on file       REVIEW OF SYSTEMS: [unfilled]    The remainder of the 10 point ROS is negative    PHYSICIAL EXAMINATION:  Constitutional: The patient was well-groomed and in no acute distress.   Skin: Warm and pink.  Psychiatric: The patient's affect was calm, cooperative, and appropriate.   Respiratory: Breathing comfortably without stridor or exertion of accessory muscles.  Eyes: Pupils were equal and reactive. Extraocular movement intact.   Head: Normocephalic and atraumatic. No lesions or scars.  Ears: Patient placed under the microscope for microscopic evaluation and cleaning of cerumen which was obscuring full visualization and complete assessment of both TMs. Under high power magnification, the right ear was examined and cleaned of cerumen using curet, alligator forceps, and suction.  After cleaning, TM is fully visualized and has normal position with normal middle ear aeration. The left ear was then cleaned and inspected using microscope, instruments and similar techniques. After cleaning of cerumen, the TM has normal position with normal aeration to middle ear.  Nose: Sinuses were nontender. Anterior rhinoscopy revealed midline septum and absence of purulence or polyps.  Oral Cavity: Normal tongue, floor of mouth, buccal mucosa, and palate. No abnormal lymph tissue in the oropharynx.   Neck: The parotid is soft without masses. Supple with normal laryngeal and tracheal landmarks.   Lymphatic: There is no palpable lymphadenopathy or other masses in the neck.   Neurologic: Alert and oriented x 3. Cranial nerves III-XI within normal limits. Voice quality normal.  Cerebellar Function Tests:  Grossly normal    Audiogram: Tuning forks are normal    IMPRESSION AND PLAN:   1. Bilateral ear pressure: Secondary to cerumen, cleaned today, resolution of symptoms, no further treatment needed,  monitor.  2. Sensitive cerumen: Cleaned today, no further treatment, monitor.  She likes to come in every 6 months for follow-up.    Thank you very much for the opportunity to participate in the care of your patient.    Rick L Nissen MD

## 2021-03-30 NOTE — NURSING NOTE
Chief Complaint   Patient presents with     RECHECK     Ear cleaning         Temperature 97.8  F (36.6  C), weight 68.4 kg (150 lb 12.7 oz), not currently breastfeeding.    Cindy Costa EMT

## 2021-03-30 NOTE — PROGRESS NOTES
Dear Valeria Reyez:    I had the pleasure of seeing Shannon Krishna in followup today at the [facility].    CHIEF COMPLAINT: Bilateral ear pressure, excessive cerumen    HISTORY OF PRESENT ILLNESS: Patient is a 80-year-old seen today for follow-up.  She comes in every 6 months for cleaning.  She does have bilateral ear pressure, no pain or drainage.  Hearing seems a little muffled.  No dizziness.  No dysphagia, hoarseness, facial paresthesias    MEDICATIONS: Please refer to the detailed medication reconciliation performed by my nurse today, which I have reviewed and signed.     ALLERGIES:    Allergies   Allergen Reactions     Nkda [No Known Drug Allergies]        HABITS: Social History    Substance and Sexual Activity      Alcohol use: No     History   Smoking Status     Former Smoker     Packs/day: 1.00     Years: 30.00     Types: Cigarettes     Start date: 1/1/1960     Quit date: 1/1/1990   Smokeless Tobacco     Never Used         PAST MEDICAL HISTORY:  Please see today's intake form (for the remainder of the PMH) which I reviewed and signed.  Past Medical History:   Diagnosis Date     Adenocarcinoma of breast (H)      Adenomatous polyp of colon 2003     Aortic sclerosis     Mild to moderate sclerosis without significant stenosis.     Arthritis 2015     Coronary artery disease     Multivessel CAD,  of the mid RCA     Esophageal reflux      Hearing problem 2010     History of radiation therapy 2008     Hyperlipidemia      Hypertension      Osteoporosis        FAMILY HISTORY/SOCIAL HISTORY:    Family History   Problem Relation Age of Onset     Family History Negative Other      Cancer Mother      Breast Cancer Mother      Diabetes Father      Heart Disease Father      Cerebrovascular Disease Father         ,, ,, ,, ,, ,, ,, ,, ,     Heart Disease Brother      Hyperlipidemia Brother      Heart Disease Brother      Diabetes Other      Glaucoma No family hx of       Macular Degeneration No family hx of      Amblyopia No family hx of      Coronary Artery Disease No family hx of       Social History     Socioeconomic History     Marital status: Legally      Spouse name: Not on file     Number of children: Not on file     Years of education: Not on file     Highest education level: Not on file   Occupational History     Not on file   Social Needs     Financial resource strain: Not on file     Food insecurity     Worry: Not on file     Inability: Not on file     Transportation needs     Medical: Not on file     Non-medical: Not on file   Tobacco Use     Smoking status: Former Smoker     Packs/day: 1.00     Years: 30.00     Pack years: 30.00     Types: Cigarettes     Start date: 1960     Quit date: 1990     Years since quittin.2     Smokeless tobacco: Never Used   Substance and Sexual Activity     Alcohol use: No     Drug use: No     Sexual activity: Not Currently     Partners: Male     Birth control/protection: None   Lifestyle     Physical activity     Days per week: Not on file     Minutes per session: Not on file     Stress: Not on file   Relationships     Social connections     Talks on phone: Not on file     Gets together: Not on file     Attends Gnosticist service: Not on file     Active member of club or organization: Not on file     Attends meetings of clubs or organizations: Not on file     Relationship status: Not on file     Intimate partner violence     Fear of current or ex partner: Not on file     Emotionally abused: Not on file     Physically abused: Not on file     Forced sexual activity: Not on file   Other Topics Concern     Parent/sibling w/ CABG, MI or angioplasty before 65F 55M? Not Asked   Social History Narrative     Not on file       REVIEW OF SYSTEMS: [unfilled]    The remainder of the 10 point ROS is negative    PHYSICIAL EXAMINATION:  Constitutional: The patient was well-groomed and in no acute distress.   Skin: Warm and  pink.  Psychiatric: The patient's affect was calm, cooperative, and appropriate.   Respiratory: Breathing comfortably without stridor or exertion of accessory muscles.  Eyes: Pupils were equal and reactive. Extraocular movement intact.   Head: Normocephalic and atraumatic. No lesions or scars.  Ears: Patient placed under the microscope for microscopic evaluation and cleaning of cerumen which was obscuring full visualization and complete assessment of both TMs. Under high power magnification, the right ear was examined and cleaned of cerumen using curet, alligator forceps, and suction.  After cleaning, TM is fully visualized and has normal position with normal middle ear aeration. The left ear was then cleaned and inspected using microscope, instruments and similar techniques. After cleaning of cerumen, the TM has normal position with normal aeration to middle ear.  Nose: Sinuses were nontender. Anterior rhinoscopy revealed midline septum and absence of purulence or polyps.  Oral Cavity: Normal tongue, floor of mouth, buccal mucosa, and palate. No abnormal lymph tissue in the oropharynx.   Neck: The parotid is soft without masses. Supple with normal laryngeal and tracheal landmarks.   Lymphatic: There is no palpable lymphadenopathy or other masses in the neck.   Neurologic: Alert and oriented x 3. Cranial nerves III-XI within normal limits. Voice quality normal.  Cerebellar Function Tests:  Grossly normal    Audiogram: Tuning forks are normal    IMPRESSION AND PLAN:   1. Bilateral ear pressure: Secondary to cerumen, cleaned today, resolution of symptoms, no further treatment needed, monitor.  2. Sensitive cerumen: Cleaned today, no further treatment, monitor.  She likes to come in every 6 months for follow-up.    Thank you very much for the opportunity to participate in the care of your patient.    Rick L Nissen MD

## 2021-05-12 DIAGNOSIS — I25.10 CAD (CORONARY ARTERY DISEASE): ICD-10-CM

## 2021-05-13 RX ORDER — METOPROLOL SUCCINATE 25 MG/1
25 TABLET, EXTENDED RELEASE ORAL DAILY
Qty: 90 TABLET | Refills: 0 | Status: SHIPPED | OUTPATIENT
Start: 2021-05-13 | End: 2021-01-01

## 2021-05-13 NOTE — TELEPHONE ENCOUNTER
Last Clinic Visit: 12/22/2020 ( RTC 1 y)  [facility] [place]  Past due BP check- FYI to clinic   RF 90 day

## 2021-07-28 NOTE — TELEPHONE ENCOUNTER
Pt was called.   Does not want an appt with MD ( Dr Crystal Thomson)  She just want prolia.  I told her without seeing MD a Prolia shot can not be given. She said she would call to her MD at [place].     Thank you    Angel

## 2021-07-28 NOTE — TELEPHONE ENCOUNTER
Received referral from [facility] for patient interested in Prolia injection at [facility]. Patient sees family practice at [facility].     Records available in Care Everywhere.     Routing to  to contact to schedule with Dr. Thomson for new patient consult.

## 2021-08-10 NOTE — TELEPHONE ENCOUNTER
metoprolol succinate ER (TOPROL-XL) 25 MG 24 hr tablet   Take 1 tablet (25 mg) by mouth daily      Last Written Prescription Date:  5/13/21  Last Fill Quantity: 90,   # refills: 0  Last Office Visit : 12/22/20   Return in about 1 year (around 12/22/2021).    Future Office visit: none    Routing refill request to provider for review/approval because:  Overdue BP. 2nd requests.  Clinic notified. 30 pended.

## 2021-08-12 NOTE — TELEPHONE ENCOUNTER
LCV: 12/22/2020  [facility]  Overdue BP check, FYI to clinic  RF 90 day

## 2021-08-26 NOTE — TELEPHONE ENCOUNTER
[facility] Call Center    Phone Message    May a detailed message be left on voicemail: yes     Reason for Call: Other: Pt calling to ask if  would recommend she get the covid booster shot. Please call back to discuss.     Action Taken: Message routed to:  [facility] cardio    Travel Screening: Not Applicable

## 2021-10-05 NOTE — PATIENT INSTRUCTIONS
1. You were seen in the ENT Clinic today by Dr. Nissen.  If you have any questions or concerns after your appointment, please call   - Option 1: ENT Clinic: [phone]   - Option 2: Melinda (Dr. Nissen's Nurse): [phone]                   Valeria(Dr. Nissen's Nurse): [phone]      2.   Plan to return to clinic in 6 months     Melinda Clemente LPN  [facility] - Otolaryngology

## 2021-10-05 NOTE — PROGRESS NOTES
Dear Valeria Reyez:    I had the pleasure of seeing Shannon Krishna in followup today at the [facility].    CHIEF COMPLAINT: Ears    HISTORY OF PRESENT ILLNESS: Patient is a 80-year-old in today for follow-up from her last visit in March.  She comes in every 6 months for ear cleaning.  She has problems with excessive cerumen production.  Today she reports she is not had any pain or drainage.  Does have bilateral ear pressure.  Hearing seems muffled.  She does have one hearing aid but does not really use that often.  Feels her hearing overall is stable.    MEDICATIONS: Please refer to the detailed medication reconciliation performed by my nurse today, which I have reviewed and signed.     ALLERGIES:    Allergies   Allergen Reactions     Nkda [No Known Drug Allergies]        HABITS: Social History    Substance and Sexual Activity      Alcohol use: No     History   Smoking Status     Former Smoker     Packs/day: 1.00     Years: 30.00     Types: Cigarettes     Start date: 1/1/1960     Quit date: 1/1/1990   Smokeless Tobacco     Never Used         PAST MEDICAL HISTORY:  Please see today's intake form (for the remainder of the PMH) which I reviewed and signed.  Past Medical History:   Diagnosis Date     Adenocarcinoma of breast (H)      Adenomatous polyp of colon 2003     Aortic sclerosis     Mild to moderate sclerosis without significant stenosis.     Arthritis 2015     Coronary artery disease     Multivessel CAD,  of the mid RCA     Esophageal reflux      Hearing problem 2010     History of radiation therapy 2008     Hyperlipidemia      Hypertension      Osteoporosis        FAMILY HISTORY/SOCIAL HISTORY:    Family History   Problem Relation Age of Onset     Family History Negative Other      Cancer Mother      Breast Cancer Mother      Diabetes Father      Heart Disease Father      Cerebrovascular Disease Father         ,, ,, ,, ,, ,, ,, ,, ,     Heart Disease  Brother      Hyperlipidemia Brother      Heart Disease Brother      Diabetes Other      Glaucoma No family hx of      Macular Degeneration No family hx of      Amblyopia No family hx of      Coronary Artery Disease No family hx of       Social History     Socioeconomic History     Marital status: Legally      Spouse name: Not on file     Number of children: Not on file     Years of education: Not on file     Highest education level: Not on file   Occupational History     Not on file   Tobacco Use     Smoking status: Former Smoker     Packs/day: 1.00     Years: 30.00     Pack years: 30.00     Types: Cigarettes     Start date: 1960     Quit date: 1990     Years since quittin.7     Smokeless tobacco: Never Used   Substance and Sexual Activity     Alcohol use: No     Drug use: No     Sexual activity: Not Currently     Partners: Male     Birth control/protection: None   Other Topics Concern     Parent/sibling w/ CABG, MI or angioplasty before 65F 55M? Not Asked   Social History Narrative     Not on file     Social Determinants of Health     Financial Resource Strain:      Difficulty of Paying Living Expenses:    Food Insecurity:      Worried About Running Out of Food in the Last Year:      Ran Out of Food in the Last Year:    Transportation Needs:      Lack of Transportation (Medical):      Lack of Transportation (Non-Medical):    Physical Activity:      Days of Exercise per Week:      Minutes of Exercise per Session:    Stress:      Feeling of Stress :    Social Connections:      Frequency of Communication with Friends and Family:      Frequency of Social Gatherings with Friends and Family:      Attends Nondenominational Services:      Active Member of Clubs or Organizations:      Attends Club or Organization Meetings:      Marital Status:    Intimate Partner Violence:      Fear of Current or Ex-Partner:      Emotionally Abused:      Physically Abused:      Sexually Abused:        REVIEW OF SYSTEMS:  [unfilled]    The remainder of the 10 point ROS is negative    PHYSICIAL EXAMINATION:  Constitutional: The patient was well-groomed and in no acute distress.   Skin: Warm and pink.  Psychiatric: The patient's affect was calm, cooperative, and appropriate.   Respiratory: Breathing comfortably without stridor or exertion of accessory muscles.  Eyes: Pupils were equal and reactive. Extraocular movement intact.   Head: Normocephalic and atraumatic. No lesions or scars.  Ears: Patient placed under the microscope for microscopic evaluation and cleaning of cerumen which was obscuring full visualization and complete assessment of both TMs. Under high power magnification, the right ear was examined and cleaned of cerumen using curet, alligator forceps, and suction.  After cleaning, TM is fully visualized and has normal position with normal middle ear aeration. The left ear was then cleaned and inspected using microscope, instruments and similar techniques. After cleaning of cerumen, the TM has normal position with normal aeration to middle ear.  Nose: Sinuses were nontender. Anterior rhinoscopy revealed midline septum and absence of purulence or polyps.  Oral Cavity: Normal tongue, floor of mouth, buccal mucosa, and palate. No abnormal lymph tissue in the oropharynx.   Neck: The parotid is soft without masses. Supple with normal laryngeal and tracheal landmarks.   Lymphatic: There is no palpable lymphadenopathy or other masses in the neck.   Neurologic: Alert and oriented x 3. Cranial nerves III-XI within normal limits. Voice quality normal.  Cerebellar Function Tests:  Grossly normal    Audiogram: Tuning forks are normal after cleaning.    IMPRESSION AND PLAN:   1. Bilateral excessive cerumen: Cleaned today, no further treatment needed, monitor.  Ears feel better and improved hearing after cleaning.  She like to come in 6 months we will see her back then or sooner with problems.  2. Bilateral ear pressure: Proved after  cleaning ears, no further treatment needed, monitor.  3. Bilateral sensorineural hearing loss: No audiogram but tuning forks are symmetrical and positive.  Monitor.    Thank you very much for the opportunity to participate in the care of your patient.    Rick L Nissen MD

## 2021-10-05 NOTE — NURSING NOTE
Chief Complaint   Patient presents with     RECHECK     ear cleaning      Pulse 78, temperature 97.4  F (36.3  C), weight 65 kg (143 lb 4.8 oz), SpO2 98 %, not currently breastfeeding.    Jaya Larson LPN

## 2021-11-08 NOTE — TELEPHONE ENCOUNTER
Last Clinic Visit: Last Clinic Visit: 12/22/2020  [facility] [place]    Appointment due around 12/22/2021: Isosorbide refill 90 days and FYI to cardiology

## 2022-01-01 ENCOUNTER — HEALTH MAINTENANCE LETTER (OUTPATIENT)
Age: 82
End: 2022-01-01

## 2022-01-01 ENCOUNTER — ANCILLARY PROCEDURE (OUTPATIENT)
Dept: CARDIOLOGY | Facility: CLINIC | Age: 82
End: 2022-01-01
Attending: INTERNAL MEDICINE
Payer: MEDICARE

## 2022-01-01 ENCOUNTER — TELEPHONE (OUTPATIENT)
Dept: CARDIOLOGY | Facility: CLINIC | Age: 82
End: 2022-01-01
Payer: MEDICARE

## 2022-01-01 ENCOUNTER — ANCILLARY PROCEDURE (OUTPATIENT)
Dept: MAMMOGRAPHY | Facility: CLINIC | Age: 82
End: 2022-01-01
Payer: MEDICARE

## 2022-01-01 ENCOUNTER — OFFICE VISIT (OUTPATIENT)
Dept: OTOLARYNGOLOGY | Facility: CLINIC | Age: 82
End: 2022-01-01
Payer: MEDICARE

## 2022-01-01 VITALS
BODY MASS INDEX: 24.07 KG/M2 | HEART RATE: 79 BPM | TEMPERATURE: 97.2 F | HEIGHT: 64 IN | WEIGHT: 141 LBS | DIASTOLIC BLOOD PRESSURE: 54 MMHG | SYSTOLIC BLOOD PRESSURE: 97 MMHG

## 2022-01-01 VITALS
OXYGEN SATURATION: 99 % | WEIGHT: 136.7 LBS | DIASTOLIC BLOOD PRESSURE: 67 MMHG | SYSTOLIC BLOOD PRESSURE: 169 MMHG | HEIGHT: 65 IN | HEART RATE: 74 BPM | BODY MASS INDEX: 22.78 KG/M2

## 2022-01-01 DIAGNOSIS — I25.119 CORONARY ARTERY DISEASE INVOLVING NATIVE CORONARY ARTERY OF NATIVE HEART WITH ANGINA PECTORIS (H): ICD-10-CM

## 2022-01-01 DIAGNOSIS — I35.9 AORTIC VALVE DISORDER: Primary | ICD-10-CM

## 2022-01-01 DIAGNOSIS — I25.10 CAD (CORONARY ARTERY DISEASE): ICD-10-CM

## 2022-01-01 DIAGNOSIS — I35.9 AORTIC VALVE DISORDER: ICD-10-CM

## 2022-01-01 DIAGNOSIS — Z12.31 VISIT FOR SCREENING MAMMOGRAM: ICD-10-CM

## 2022-01-01 DIAGNOSIS — H61.23 BILATERAL IMPACTED CERUMEN: Primary | ICD-10-CM

## 2022-01-01 DIAGNOSIS — I10 HYPERTENSION: ICD-10-CM

## 2022-01-01 DIAGNOSIS — I25.119 CORONARY ARTERY DISEASE INVOLVING NATIVE CORONARY ARTERY OF NATIVE HEART WITH ANGINA PECTORIS (H): Primary | ICD-10-CM

## 2022-01-01 LAB — LVEF ECHO: NORMAL

## 2022-01-01 PROCEDURE — 93306 TTE W/DOPPLER COMPLETE: CPT | Mod: GC | Performed by: INTERNAL MEDICINE

## 2022-01-01 PROCEDURE — 77063 BREAST TOMOSYNTHESIS BI: CPT | Mod: GC

## 2022-01-01 PROCEDURE — 69210 REMOVE IMPACTED EAR WAX UNI: CPT | Mod: LT | Performed by: REGISTERED NURSE

## 2022-01-01 PROCEDURE — G0463 HOSPITAL OUTPT CLINIC VISIT: HCPCS

## 2022-01-01 PROCEDURE — 77067 SCR MAMMO BI INCL CAD: CPT | Mod: GC

## 2022-01-01 PROCEDURE — 99213 OFFICE O/P EST LOW 20 MIN: CPT | Mod: 25 | Performed by: INTERNAL MEDICINE

## 2022-01-01 RX ORDER — ISOSORBIDE MONONITRATE 60 MG/1
60 TABLET, EXTENDED RELEASE ORAL DAILY
Qty: 90 TABLET | Refills: 3 | Status: SHIPPED | OUTPATIENT
Start: 2022-01-01

## 2022-01-01 RX ORDER — ISOSORBIDE MONONITRATE 60 MG/1
60 TABLET, EXTENDED RELEASE ORAL DAILY
Qty: 90 TABLET | Refills: 0 | Status: SHIPPED | OUTPATIENT
Start: 2022-01-01 | End: 2022-01-01

## 2022-01-01 RX ORDER — ISOSORBIDE MONONITRATE 60 MG/1
TABLET, EXTENDED RELEASE ORAL
Qty: 90 TABLET | Refills: 0 | OUTPATIENT
Start: 2022-01-01

## 2022-01-01 RX ORDER — METOPROLOL SUCCINATE 25 MG/1
25 TABLET, EXTENDED RELEASE ORAL DAILY
Qty: 90 TABLET | Refills: 3 | Status: SHIPPED | OUTPATIENT
Start: 2022-01-01

## 2022-01-01 ASSESSMENT — PAIN SCALES - GENERAL
PAINLEVEL: NO PAIN (0)
PAINLEVEL: NO PAIN (0)

## 2022-02-10 NOTE — TELEPHONE ENCOUNTER
isosorbide mononitrate (IMDUR) 60 MG 24 hr tablet      Last Written Prescription Date:  11-8-21  Last Fill Quantity: 90,   # refills: 0  Last Office Visit : 12-22-20  Future Office visit:  None  RF 90 day  Scheduling has been notified to contact the pt for appointment.

## 2022-02-10 NOTE — TELEPHONE ENCOUNTER
Pt of Dr. Shin, appt past due.  Last note: Follow up in one year with an echocardiogram   Please call to schedule.     Thanks     I do not need any follow up on the scheduling of this appointment unless the patient will no longer be receiving care in our clinic.        
Patient

## 2022-04-05 NOTE — PROGRESS NOTES
SUBJECTIVE:  Shannon Krishna is a 81 year old female who presents for follow-up.  RCA  and aortic stenosis.    Patient known to have  of RCA.  This is well collateralized.  Stress test shows no ischemia.  Patient does get intermittent atypical chest pain for which she takes nitroglycerin.  Patient was also evaluated at [facility] for this and was advised no intervention.   Currently patient is fairly active with no cardiac symptoms.  Specifically denied exertional chest pain or shortness of breath or dizziness or lightheadedness.  Her symptoms status remain unchanged.  Patient Active Problem List    Diagnosis Date Noted     Use of aromatase inhibitors 09/05/2019     Priority: Medium     Encounter for other specified aftercare 09/05/2019     Priority: Medium     Encounter for long-term (current) use of other medications 09/05/2019     Priority: Medium     Bilateral impacted cerumen 11/20/2017     Priority: Medium     Collapse of both external ear canals 11/20/2017     Priority: Medium     Age-related osteoporosis with current pathological fracture 08/16/2016     Priority: Medium     Breast cancer of upper-outer quadrant of right female breast (H) 12/16/2015     Priority: Medium     Osteoporosis 08/06/2013     Priority: Medium     Imo Update utility       Status post coronary angiogram 09/27/2012     Priority: Medium     Coronary artery disease 09/27/2012     Priority: Medium     S/P angioplasty 08/30/2012     Priority: Medium     CAD (coronary artery disease) 08/30/2012     Priority: Medium     Aortic sclerosis      Priority: Medium     Hypertension      Priority: Medium     Hyperlipidemia      Priority: Medium     Esophageal reflux      Priority: Medium     Adenocarcinoma of breast (H)      Priority: Medium     Breast cancer (H) 03/16/2011     Priority: Medium    .  Current Outpatient Medications   Medication Sig     amitriptyline (ELAVIL) 25 MG tablet Take 1-2 tablets by mouth At Bedtime.     aspirin  EC 81 MG EC tablet Take 1 tablet by mouth daily.     atorvastatin (LIPITOR) 40 MG tablet Take 40 mg by mouth daily.     cimetidine (TAGAMET) 200 MG tablet Take 200 mg by mouth every morning     diclofenac (VOLTAREN) 1 % GEL topical gel Apply 4 grams to knees or 2 grams to hands four times daily using enclosed dosing card.     famotidine (PEPCID) 10 MG tablet Take 10 mg by mouth At Bedtime     isosorbide mononitrate (IMDUR) 60 MG 24 hr tablet Take 1 tablet (60 mg) by mouth daily For additional refills, please schedule a follow-up appointment at [phone]     lisinopril (PRINIVIL,ZESTRIL) 10 MG tablet Take 1 tablet by mouth daily 10 mg total     metoprolol succinate ER (TOPROL-XL) 25 MG 24 hr tablet Take 1 tablet (25 mg) by mouth daily     Multiple Vitamin (MULTIVITAMINS PO) Take 1 tablet by mouth daily.     nitroGLYcerin (NITROSTAT) 0.4 MG sublingual tablet Place 1 tablet (0.4 mg) under the tongue every 5 minutes as needed for chest pain     Omega-3 Fatty Acids (OMEGA 3 PO) Take  by mouth. 1 every two days     oxazepam (SERAX) 10 MG capsule Take 1 capsule by mouth daily.     denosumab (PROLIA) 60 MG/ML SOLN injection Inject 60 mg Subcutaneous once (Patient not taking: Reported on 4/5/2022)     No current facility-administered medications for this visit.     Past Medical History:   Diagnosis Date     Adenocarcinoma of breast (H)      Adenomatous polyp of colon 2003     Aortic sclerosis     Mild to moderate sclerosis without significant stenosis.     Arthritis 2015     Coronary artery disease     Multivessel CAD,  of the mid RCA     Esophageal reflux      Hearing problem 2010     History of radiation therapy 2008     Hyperlipidemia      Hypertension      Osteoporosis      Past Surgical History:   Procedure Laterality Date     APPENDECTOMY      1950's     BIOPSY  2008    ultrasound guided biopsy of right breast     BREAST SURGERY       CARDIAC SURGERY  2008     COLONOSCOPY  2015     GENITOURINARY SURGERY  1970     cryosurgery on vagina     GENITOURINARY SURGERY      conization     GI SURGERY  's    perforated ulcer     GYN SURGERY       LUMPECTOMY BREAST  2008    breast cancer, done in [facility]     port-a-cath  2008    Removed in      TONSILLECTOMY  1950     VASCULAR SURGERY       Allergies   Allergen Reactions     Nkda [No Known Drug Allergies]      Social History     Socioeconomic History     Marital status: Legally      Spouse name: Not on file     Number of children: Not on file     Years of education: Not on file     Highest education level: Not on file   Occupational History     Not on file   Tobacco Use     Smoking status: Former Smoker     Packs/day: 1.00     Years: 30.00     Pack years: 30.00     Types: Cigarettes     Start date: 1960     Quit date: 1990     Years since quittin.2     Smokeless tobacco: Never Used   Substance and Sexual Activity     Alcohol use: No     Drug use: No     Sexual activity: Not Currently     Partners: Male     Birth control/protection: None   Other Topics Concern     Parent/sibling w/ CABG, MI or angioplasty before 65F 55M? Not Asked   Social History Narrative     Not on file     Social Determinants of Health     Financial Resource Strain: Not on file   Food Insecurity: Not on file   Transportation Needs: Not on file   Physical Activity: Not on file   Stress: Not on file   Social Connections: Not on file   Intimate Partner Violence: Not on file   Housing Stability: Not on file     Family History   Problem Relation Age of Onset     Family History Negative Other      Cancer Mother      Breast Cancer Mother      Diabetes Father      Heart Disease Father      Cerebrovascular Disease Father         ,, ,, ,, ,, ,, ,, ,, ,     Heart Disease Brother      Hyperlipidemia Brother      Heart Disease Brother      Diabetes Other      Glaucoma No family hx of      Macular Degeneration No family hx of      Amblyopia No family hx of      Coronary Artery Disease No  "family hx of           REVIEW OF SYSTEMS:  General: negative, fever, chills, night sweats  Skin: negative, acne, rash and scaling  Eyes: negative, double vision, eye pain and photophobia  Ears/Nose/Throat: negative, nasal congestion and purulent rhinorrhea  Respiratory: No dyspnea on exertion, No cough, No hemoptysis and negative  Cardiovascular: negative, palpitations, tachycardia, irregular heart beat, chest pain, exertional chest pain or pressure, paroxysmal nocturnal dyspnea, dyspnea on exertion and orthopnea       OBJECTIVE:  Blood pressure (!) 169/67, pulse 74, height 1.65 m (5' 4.96\"), weight 62 kg (136 lb 11.2 oz), SpO2 99 %, not currently breastfeeding.  General Appearance: healthy, alert, active and no distress  Head: Normocephalic. No masses, lesions, tenderness or abnormalities  Eyes: conjuctiva clear, PERRL, EOM intact  Ears: External ears normal. Canals clear. TM's normal.  Nose: Nares normal  Mouth: normal  Neck: Supple, no cervical adenopathy, no thyromegaly  Lungs: clear to auscultation  Cardiac: regular rate and rhythm, normal S1 and S2, ESM.       ASSESSMENT/PLAN:  Patient here for yearly follow-up.   of RCA which is well collateralized with no ischemic symptoms.  Also moderate to severe aortic stenosis.  Currently patient has no symptoms related to CAD or valvular heart disease.  Patient is fairly active.  Today's echocardiogram reviewed and result discussed with patient.  Normal LV size.  Mild LVH.  Normal biventricular function.  Moderate to severe aortic stenosis.  Peak aortic velocity up to 4 m/s and mean gradient of up to 38 mmHg.  Valve area calculated in the severe range.  Again discussed symptoms of severe aortic stenosis.  If patient develops any of these symptoms she will contact the clinic.  Otherwise she will have a repeat echocardiogram in 1 year's time as progression of the aortic stenosis is very slow.  Return to Clinic 1 year with an echocardiogram.  Total visit duration 20 " minutes.  This include face-to-face interview, physical exam, chart review, review of echocardiograms and documentation.

## 2022-04-05 NOTE — PATIENT INSTRUCTIONS
Return in one year : echocardiogram and then a follow up appointment with Dr. Harvey afterwards.  You will receive a scheduling reminder in advance for these appointments.

## 2022-04-05 NOTE — LETTER
4/5/2022      RE: Shannon Krishna  [address]  [facility] [address]       Dear Colleague,    Thank you for the opportunity to participate in the care of your patient, Shannon Krishna, at the [facility] [place] at [facility]. Please see a copy of my visit note below.       SUBJECTIVE:  Shannon Krishna is a 81 year old female who presents for follow-up.  RCA  and aortic stenosis.    Patient known to have  of RCA.  This is well collateralized.  Stress test shows no ischemia.  Patient does get intermittent atypical chest pain for which she takes nitroglycerin.  Patient was also evaluated at [facility] for this and was advised no intervention.   Currently patient is fairly active with no cardiac symptoms.  Specifically denied exertional chest pain or shortness of breath or dizziness or lightheadedness.  Her symptoms status remain unchanged.  Patient Active Problem List    Diagnosis Date Noted     Use of aromatase inhibitors 09/05/2019     Priority: Medium     Encounter for other specified aftercare 09/05/2019     Priority: Medium     Encounter for long-term (current) use of other medications 09/05/2019     Priority: Medium     Bilateral impacted cerumen 11/20/2017     Priority: Medium     Collapse of both external ear canals 11/20/2017     Priority: Medium     Age-related osteoporosis with current pathological fracture 08/16/2016     Priority: Medium     Breast cancer of upper-outer quadrant of right female breast (H) 12/16/2015     Priority: Medium     Osteoporosis 08/06/2013     Priority: Medium     Imo Update utility       Status post coronary angiogram 09/27/2012     Priority: Medium     Coronary artery disease 09/27/2012     Priority: Medium     S/P angioplasty 08/30/2012     Priority: Medium     CAD (coronary artery disease) 08/30/2012     Priority: Medium     Aortic sclerosis      Priority: Medium     Hypertension       Priority: Medium     Hyperlipidemia      Priority: Medium     Esophageal reflux      Priority: Medium     Adenocarcinoma of breast (H)      Priority: Medium     Breast cancer (H) 03/16/2011     Priority: Medium    .  Current Outpatient Medications   Medication Sig     amitriptyline (ELAVIL) 25 MG tablet Take 1-2 tablets by mouth At Bedtime.     aspirin EC 81 MG EC tablet Take 1 tablet by mouth daily.     atorvastatin (LIPITOR) 40 MG tablet Take 40 mg by mouth daily.     cimetidine (TAGAMET) 200 MG tablet Take 200 mg by mouth every morning     diclofenac (VOLTAREN) 1 % GEL topical gel Apply 4 grams to knees or 2 grams to hands four times daily using enclosed dosing card.     famotidine (PEPCID) 10 MG tablet Take 10 mg by mouth At Bedtime     isosorbide mononitrate (IMDUR) 60 MG 24 hr tablet Take 1 tablet (60 mg) by mouth daily For additional refills, please schedule a follow-up appointment at [phone]     lisinopril (PRINIVIL,ZESTRIL) 10 MG tablet Take 1 tablet by mouth daily 10 mg total     metoprolol succinate ER (TOPROL-XL) 25 MG 24 hr tablet Take 1 tablet (25 mg) by mouth daily     Multiple Vitamin (MULTIVITAMINS PO) Take 1 tablet by mouth daily.     nitroGLYcerin (NITROSTAT) 0.4 MG sublingual tablet Place 1 tablet (0.4 mg) under the tongue every 5 minutes as needed for chest pain     Omega-3 Fatty Acids (OMEGA 3 PO) Take  by mouth. 1 every two days     oxazepam (SERAX) 10 MG capsule Take 1 capsule by mouth daily.     denosumab (PROLIA) 60 MG/ML SOLN injection Inject 60 mg Subcutaneous once (Patient not taking: Reported on 4/5/2022)     No current facility-administered medications for this visit.     Past Medical History:   Diagnosis Date     Adenocarcinoma of breast (H)      Adenomatous polyp of colon 2003     Aortic sclerosis     Mild to moderate sclerosis without significant stenosis.     Arthritis 2015     Coronary artery disease     Multivessel CAD,  of the mid RCA     Esophageal reflux      Hearing  problem 2010     History of radiation therapy 2008     Hyperlipidemia      Hypertension      Osteoporosis      Past Surgical History:   Procedure Laterality Date     APPENDECTOMY      's     BIOPSY  2008    ultrasound guided biopsy of right breast     BREAST SURGERY       CARDIAC SURGERY  2008     COLONOSCOPY  2015     GENITOURINARY SURGERY  1970    cryosurgery on vagina     GENITOURINARY SURGERY      conization     GI SURGERY      perforated ulcer     GYN SURGERY       LUMPECTOMY BREAST  2008    breast cancer, done in [facility]     port-a-cath  2008    Removed in      TONSILLECTOMY  1950     VASCULAR SURGERY       Allergies   Allergen Reactions     Nkda [No Known Drug Allergies]      Social History     Socioeconomic History     Marital status: Legally      Spouse name: Not on file     Number of children: Not on file     Years of education: Not on file     Highest education level: Not on file   Occupational History     Not on file   Tobacco Use     Smoking status: Former Smoker     Packs/day: 1.00     Years: 30.00     Pack years: 30.00     Types: Cigarettes     Start date: 1960     Quit date: 1990     Years since quittin.2     Smokeless tobacco: Never Used   Substance and Sexual Activity     Alcohol use: No     Drug use: No     Sexual activity: Not Currently     Partners: Male     Birth control/protection: None   Other Topics Concern     Parent/sibling w/ CABG, MI or angioplasty before 65F 55M? Not Asked   Social History Narrative     Not on file     Social Determinants of Health     Financial Resource Strain: Not on file   Food Insecurity: Not on file   Transportation Needs: Not on file   Physical Activity: Not on file   Stress: Not on file   Social Connections: Not on file   Intimate Partner Violence: Not on file   Housing Stability: Not on file     Family History   Problem Relation Age of Onset     Family History Negative Other      Cancer Mother      Breast Cancer  "Mother      Diabetes Father      Heart Disease Father      Cerebrovascular Disease Father         ,, ,, ,, ,, ,, ,, ,, ,     Heart Disease Brother      Hyperlipidemia Brother      Heart Disease Brother      Diabetes Other      Glaucoma No family hx of      Macular Degeneration No family hx of      Amblyopia No family hx of      Coronary Artery Disease No family hx of           REVIEW OF SYSTEMS:  General: negative, fever, chills, night sweats  Skin: negative, acne, rash and scaling  Eyes: negative, double vision, eye pain and photophobia  Ears/Nose/Throat: negative, nasal congestion and purulent rhinorrhea  Respiratory: No dyspnea on exertion, No cough, No hemoptysis and negative  Cardiovascular: negative, palpitations, tachycardia, irregular heart beat, chest pain, exertional chest pain or pressure, paroxysmal nocturnal dyspnea, dyspnea on exertion and orthopnea       OBJECTIVE:  Blood pressure (!) 169/67, pulse 74, height 1.65 m (5' 4.96\"), weight 62 kg (136 lb 11.2 oz), SpO2 99 %, not currently breastfeeding.  General Appearance: healthy, alert, active and no distress  Head: Normocephalic. No masses, lesions, tenderness or abnormalities  Eyes: conjuctiva clear, PERRL, EOM intact  Ears: External ears normal. Canals clear. TM's normal.  Nose: Nares normal  Mouth: normal  Neck: Supple, no cervical adenopathy, no thyromegaly  Lungs: clear to auscultation  Cardiac: regular rate and rhythm, normal S1 and S2, ESM.       ASSESSMENT/PLAN:  Patient here for yearly follow-up.   of RCA which is well collateralized with no ischemic symptoms.  Also moderate to severe aortic stenosis.  Currently patient has no symptoms related to CAD or valvular heart disease.  Patient is fairly active.  Today's echocardiogram reviewed and result discussed with patient.  Normal LV size.  Mild LVH.  Normal biventricular function.  Moderate to severe aortic stenosis.  Peak aortic velocity up to 4 m/s and mean gradient of up to 38 mmHg.  Valve " area calculated in the severe range.  Again discussed symptoms of severe aortic stenosis.  If patient develops any of these symptoms she will contact the clinic.  Otherwise she will have a repeat echocardiogram in 1 year's time as progression of the aortic stenosis is very slow.  Return to Clinic 1 year with an echocardiogram.  Total visit duration 20 minutes.  This include face-to-face interview, physical exam, chart review, review of echocardiograms and documentation.        Please do not hesitate to contact me if you have any questions/concerns.     Sincerely,     NURYS Shin MD

## 2022-04-05 NOTE — NURSING NOTE
Chief Complaint   Patient presents with     Follow Up      1 year return     Vitals were taken and medications reconciled.    José Enrique, EMT  9:40 AM

## 2022-04-15 NOTE — TELEPHONE ENCOUNTER
[facility] Call Center    Phone Message    May a detailed message be left on voicemail: yes     Reason for Call: Other: Pt called in stating her AVS/med list from [facility] with [facility] states to f/u in one month. Active requests however state to f/u in a year. Please review and c/b to let Pt know when she needs to f/u     Action Taken: Message routed to:  [facility] cardio    Travel Screening: Not Applicable

## 2022-04-18 NOTE — PATIENT INSTRUCTIONS
- You were seen in the ENT Clinic today by Lilli Ramirez NP.   - Follow up in 6 months for repeat ear cleaning.  - Please send a [employer] message or call the ENT clinic at [phone] with any questions or concerns.

## 2022-04-18 NOTE — LETTER
4/18/2022       RE: Shannon Krishna  [address]  [facility] [address]     Dear Colleague,    Thank you for referring your patient, Shannon Krishna, to the [facility] EAR NOSE AND [facility]ROAT [facility]IN[facility] [place] at [facility]. Please see a copy of my visit note below.      Otolaryngology Clinic  April 18, 2022    HPI:  Shannon Krishna is here for scheduled ear cleaning due to history of excessive cerumen bilaterally.  Patient requires ear cleaning under microscopy every 6 months.  Patient has collapsed ear canals requiring use of microscopy for safety of cleaning.    Today, patient denies any ear pain or drainage.  Ears have become more itchy over the past month.     Otologic microscope exam:    Biocular Microscopy exam is needed due to deep impaction of cerumen of bilateral ears, requiring direct visualization for use of cleaning instruments.    Right ear was examined under the microscope.  Narrow, collapsed ear canals.  Cerumen impaction noted. It was cleaned with suction and alligator forceps. Once cleaned, TM visualized under microscope. Normal appearing TM, nicely aerated middle ear space.     Left ear was also examined under the microscope.  Narrow, collapsed ear canals. Cerumen impaction noted. It was cleaned with right angle hook and suction. Once cleaned, TM visualized under microscope. Normal appearing TM, nicely aerated middle ear space.     The patient noted improvement of symptoms.    Assessment and Plan:  1. Bilateral impacted cerumen  Patient presents with cerumen impaction of bilateral ears.  The patient's ears are cleaned today under microscopy due to narrow, collapsed ear canals. Return in 6 months for scheduled ear cleaning.     Lilli Ramirez DNP, APRN, CNP  Otolaryngology  Head & Neck Surgery  [phone]    20 minutes spent on the date of the encounter doing chart review, history and exam, documentation and further activities  per the note excluding time performing ear cleaning under microscopy.        Again, thank you for allowing me to participate in the care of your patient.      Sincerely,    Erika Ramirez NP

## 2022-04-18 NOTE — NURSING NOTE
"Chief Complaint   Patient presents with     RECHECK     6 month follow up ear cleaning       Blood pressure 97/54, pulse 79, temperature 97.2  F (36.2  C), temperature source Temporal, height 1.626 m (5' 4\"), weight 64 kg (141 lb), not currently breastfeeding.    Asuncion Don, EMT    "

## 2022-04-18 NOTE — PROGRESS NOTES
Otolaryngology Clinic  April 18, 2022    HPI:  Shannon Krishna is here for scheduled ear cleaning due to history of excessive cerumen bilaterally.  Patient requires ear cleaning under microscopy every 6 months.  Patient has collapsed ear canals requiring use of microscopy for safety of cleaning.    Today, patient denies any ear pain or drainage.  Ears have become more itchy over the past month.     Otologic microscope exam:    Biocular Microscopy exam is needed due to deep impaction of cerumen of bilateral ears, requiring direct visualization for use of cleaning instruments.    Right ear was examined under the microscope.  Narrow, collapsed ear canals.  Cerumen impaction noted. It was cleaned with suction and alligator forceps. Once cleaned, TM visualized under microscope. Normal appearing TM, nicely aerated middle ear space.     Left ear was also examined under the microscope.  Narrow, collapsed ear canals. Cerumen impaction noted. It was cleaned with right angle hook and suction. Once cleaned, TM visualized under microscope. Normal appearing TM, nicely aerated middle ear space.     The patient noted improvement of symptoms.    Assessment and Plan:  1. Bilateral impacted cerumen  Patient presents with cerumen impaction of bilateral ears.  The patient's ears are cleaned today under microscopy due to narrow, collapsed ear canals. Return in 6 months for scheduled ear cleaning.     Lilli Ramirez DNP, APRN, CNP  Otolaryngology  Head & Neck Surgery  [phone]    20 minutes spent on the date of the encounter doing chart review, history and exam, documentation and further activities per the note excluding time performing ear cleaning under microscopy.

## 2022-09-09 ENCOUNTER — TELEPHONE (OUTPATIENT)
Dept: OTOLARYNGOLOGY | Facility: CLINIC | Age: 82
End: 2022-09-09

## (undated) RX ORDER — TRIAMCINOLONE ACETONIDE 40 MG/ML
INJECTION, SUSPENSION INTRA-ARTICULAR; INTRAMUSCULAR
Status: DISPENSED
Start: 2017-04-18

## (undated) RX ORDER — METHYLPREDNISOLONE ACETATE 40 MG/ML
INJECTION, SUSPENSION INTRA-ARTICULAR; INTRALESIONAL; INTRAMUSCULAR; SOFT TISSUE
Status: DISPENSED
Start: 2017-04-18

## (undated) RX ORDER — LIDOCAINE HYDROCHLORIDE 10 MG/ML
INJECTION, SOLUTION INFILTRATION; PERINEURAL
Status: DISPENSED
Start: 2017-04-18